# Patient Record
Sex: FEMALE | Race: BLACK OR AFRICAN AMERICAN | Employment: UNEMPLOYED | URBAN - METROPOLITAN AREA
[De-identification: names, ages, dates, MRNs, and addresses within clinical notes are randomized per-mention and may not be internally consistent; named-entity substitution may affect disease eponyms.]

---

## 2018-04-20 ENCOUNTER — HOSPITAL ENCOUNTER (OUTPATIENT)
Dept: ULTRASOUND IMAGING | Age: 38
Discharge: HOME | End: 2018-04-20
Attending: FAMILY MEDICINE
Payer: COMMERCIAL

## 2018-04-20 DIAGNOSIS — M25.461 SWELLING OF RIGHT KNEE JOINT: ICD-10-CM

## 2018-04-20 PROCEDURE — 93971 EXTREMITY STUDY: CPT

## 2019-09-27 ENCOUNTER — OFFICE VISIT (OUTPATIENT)
Dept: INTERNAL MEDICINE CLINIC | Age: 39
End: 2019-09-27

## 2019-09-27 VITALS
WEIGHT: 259 LBS | HEART RATE: 99 BPM | OXYGEN SATURATION: 100 % | DIASTOLIC BLOOD PRESSURE: 60 MMHG | RESPIRATION RATE: 16 BRPM | SYSTOLIC BLOOD PRESSURE: 110 MMHG | TEMPERATURE: 98.1 F | HEIGHT: 67 IN | BODY MASS INDEX: 40.65 KG/M2

## 2019-09-27 DIAGNOSIS — Z00.00 ROUTINE CHECK-UP: Primary | ICD-10-CM

## 2019-09-27 DIAGNOSIS — E66.01 OBESITY, MORBID (HCC): ICD-10-CM

## 2019-09-27 LAB
BILIRUB UR QL STRIP: NEGATIVE
CHOLEST SERPL-MCNC: 166 MG/DL
GLUCOSE POC: 116 MG/DL
GLUCOSE UR-MCNC: NEGATIVE MG/DL
HBA1C MFR BLD HPLC: 5 %
HDLC SERPL-MCNC: 23 MG/DL
KETONES P FAST UR STRIP-MCNC: NEGATIVE MG/DL
LDL CHOLESTEROL POC: 123 MG/DL
PH UR STRIP: 5.5 [PH] (ref 4.6–8)
PROT UR QL STRIP: NEGATIVE
SP GR UR STRIP: 1.02 (ref 1–1.03)
TCHOL/HDL RATIO (POC): 7.4
TRIGL SERPL-MCNC: 103 MG/DL
UA UROBILINOGEN AMB POC: NORMAL (ref 0.2–1)
URINALYSIS CLARITY POC: CLEAR
URINALYSIS COLOR POC: YELLOW
URINE BLOOD POC: NORMAL
URINE LEUKOCYTES POC: NEGATIVE
URINE NITRITES POC: NEGATIVE
VLDLC SERPL CALC-MCNC: 144 MG/DL

## 2019-09-27 RX ORDER — SPIRONOLACTONE 25 MG/1
50 TABLET ORAL DAILY
Refills: 3 | COMMUNITY
Start: 2019-08-29 | End: 2021-11-15 | Stop reason: SDUPTHER

## 2019-09-27 RX ORDER — FUROSEMIDE 40 MG/1
80 TABLET ORAL 2 TIMES DAILY
COMMUNITY
Start: 2018-03-17 | End: 2021-12-10

## 2019-09-27 NOTE — PROGRESS NOTES
1. Have you been to the ER, urgent care clinic since your last visit? Hospitalized since your last visit? Yes When: 2/2019 Where: [facility] Reason for visit: Heart issue    2. Have you seen or consulted any other health care providers outside of the [address] [place] [address] since your last visit? Include any pap smears or colon screening.  No       Chief Complaint   Patient presents with   [facility] Patient     Visit Vitals  /60 (BP 1 Location: Left arm, BP Patient Position: Sitting)   Pulse 99   Temp 98.1 °F (36.7 °C) (Oral)   Resp 16   Ht 5' 7\" (1.702 m)   Wt 259 lb (117.5 kg)   SpO2 100%   BMI 40.57 kg/m²

## 2021-02-09 LAB — CREATININE, EXTERNAL: 1.02

## 2021-08-09 ENCOUNTER — OFFICE VISIT (OUTPATIENT)
Dept: FAMILY MEDICINE CLINIC | Age: 41
End: 2021-08-09
Payer: MEDICARE

## 2021-08-09 VITALS
HEART RATE: 101 BPM | OXYGEN SATURATION: 98 % | HEIGHT: 68 IN | TEMPERATURE: 97.3 F | DIASTOLIC BLOOD PRESSURE: 77 MMHG | RESPIRATION RATE: 18 BRPM | SYSTOLIC BLOOD PRESSURE: 116 MMHG | BODY MASS INDEX: 42.31 KG/M2 | WEIGHT: 279.2 LBS

## 2021-08-09 DIAGNOSIS — I50.9 CHRONIC CONGESTIVE HEART FAILURE, UNSPECIFIED HEART FAILURE TYPE (HCC): ICD-10-CM

## 2021-08-09 DIAGNOSIS — Z13.220 SCREENING, LIPID: ICD-10-CM

## 2021-08-09 DIAGNOSIS — Z11.59 ENCOUNTER FOR HEPATITIS C SCREENING TEST FOR LOW RISK PATIENT: ICD-10-CM

## 2021-08-09 DIAGNOSIS — Z13.1 SCREENING FOR DIABETES MELLITUS: ICD-10-CM

## 2021-08-09 DIAGNOSIS — Z00.00 WELCOME TO MEDICARE PREVENTIVE VISIT: Primary | ICD-10-CM

## 2021-08-09 DIAGNOSIS — Z11.3 SCREENING EXAMINATION FOR STD (SEXUALLY TRANSMITTED DISEASE): ICD-10-CM

## 2021-08-09 DIAGNOSIS — Z72.0 TOBACCO ABUSE: ICD-10-CM

## 2021-08-09 DIAGNOSIS — Z86.39 HISTORY OF GRAVES' DISEASE: ICD-10-CM

## 2021-08-09 PROCEDURE — G8427 DOCREV CUR MEDS BY ELIG CLIN: HCPCS | Performed by: NURSE PRACTITIONER

## 2021-08-09 PROCEDURE — G0402 INITIAL PREVENTIVE EXAM: HCPCS | Performed by: NURSE PRACTITIONER

## 2021-08-09 PROCEDURE — G8432 DEP SCR NOT DOC, RNG: HCPCS | Performed by: NURSE PRACTITIONER

## 2021-08-09 PROCEDURE — G8417 CALC BMI ABV UP PARAM F/U: HCPCS | Performed by: NURSE PRACTITIONER

## 2021-08-09 RX ORDER — LEVOTHYROXINE SODIUM 50 UG/1
TABLET ORAL
COMMUNITY
Start: 2021-07-10 | End: 2021-11-15 | Stop reason: SDUPTHER

## 2021-08-09 RX ORDER — PANTOPRAZOLE SODIUM 20 MG/1
TABLET, DELAYED RELEASE ORAL
COMMUNITY
Start: 2021-05-19 | End: 2021-11-15 | Stop reason: SDUPTHER

## 2021-08-09 RX ORDER — HYDRALAZINE HYDROCHLORIDE 25 MG/1
TABLET, FILM COATED ORAL
COMMUNITY
Start: 2021-07-18 | End: 2021-12-10

## 2021-08-09 RX ORDER — SACUBITRIL AND VALSARTAN 49; 51 MG/1; MG/1
TABLET, FILM COATED ORAL
COMMUNITY
Start: 2021-07-18 | End: 2021-11-17 | Stop reason: SDUPTHER

## 2021-08-09 RX ORDER — ERGOCALCIFEROL 1.25 MG/1
CAPSULE ORAL
COMMUNITY
Start: 2021-07-18 | End: 2021-11-15 | Stop reason: SDUPTHER

## 2021-08-09 RX ORDER — POTASSIUM CHLORIDE 20 MEQ/1
TABLET, EXTENDED RELEASE ORAL
COMMUNITY
Start: 2021-07-18 | End: 2021-12-10

## 2021-08-09 NOTE — PROGRESS NOTES
HPI:   Haleigh Araya is a 35 y.o. female who presents to [facility]. She has a history of heart failure. She needs a new cardiologist.  She has a pacemaker. She has had Raford Knuckles in past. She would like updated STD screening. She is . She has handicapped parking. She is able to walk about 100 feet before she becomes short of breath. Endocrine Review  Patient is seen for followup of hypothyroidism, Graves Disease and status post I -131 treatment. Since last visit: no changes. She reports medication compliance: all the time and is taking separate from all her other meds. She reports the following concerns/problems/med side effects: none. Lab review: orders written for new lab studies as appropriate; see orders. She has not been COVID vaccinated. Current Outpatient Medications   Medication Sig Dispense Refill    ergocalciferol (ERGOCALCIFEROL) 1,250 mcg (50,000 unit) capsule TAKE 1 CAPSULE BY MOUTH EVERY 30 DAYS      hydrALAZINE (APRESOLINE) 25 mg tablet TAKE 1 TABLET BY MOUTH THREE TIMES DAILY FOR 90 DAYS      potassium chloride (K-DUR, KLOR-CON) 20 mEq tablet TAKE 1 TABLET BY MOUTH ONCE DAILY FOR 90 DAYS      Entresto 49-51 mg tab tablet TAKE 1 TABLET BY MOUTH EVERY 12 HOURS      levothyroxine (SYNTHROID) 50 mcg tablet TAKE 1 TABLET BY MOUTH ONCE DAILY      pantoprazole (PROTONIX) 20 mg tablet TAKE 1 TABLET BY MOUTH ONCE DAILY FOR 90 DAYS      furosemide (LASIX) 40 mg tablet 80 mg two (2) times a day.  spironolactone (ALDACTONE) 25 mg tablet Take 25 mg by mouth.  2 tabs BID  3      Allergies   Allergen Reactions    Bactrim [Sulfamethoxazole-Trimethoprim] Hives      Patient Active Problem List   Diagnosis Code    History of Graves' disease Z86.39    Obesity, morbid ([facility] E66.01    Chronic congestive heart failure (HCC) I50.9    Tobacco abuse Z72.0     Past Medical History:   Diagnosis Date    Congestive heart disease ([facility]     Endocrine disease Grave's Disease    Other ill-defined conditions(799.89)     migraines    Other ill-defined conditions(799.89)     graves    Thyroid disease       Past Surgical History:   Procedure Laterality Date    HX HYSTERECTOMY  2012    HX MITRAL VALVE REPLACEMENT  2018    HX OTHER SURGICAL      removal of ink pen from abdomen    HX PACEMAKER  2021      Patient's last menstrual period was 02/17/2011. Family History   Problem Relation Age of Onset    No Known Problems Mother     Heart Failure Father     Substance Abuse Father     Breast Cancer Maternal Grandmother 72    Hypertension Paternal Grandmother     Hypertension Sister     Asthma Sister     Diabetes Maternal Grandfather     Asthma Brother     No Known Problems Daughter     No Known Problems Son       Social History     Socioeconomic History    Marital status:      Spouse name: Not on file    Number of children: Not on file    Years of education: Not on file    Highest education level: Not on file   Occupational History    Not on file   Tobacco Use    Smoking status: Current Every Day Smoker     Packs/day: 0.25    Smokeless tobacco: Never Used   Vaping Use    Vaping Use: Never used   Substance and Sexual Activity    Alcohol use: Yes     Comment: rarely    Drug use: No    Sexual activity: Yes     Partners: Male     Birth control/protection: None   Other Topics Concern    Not on file   Social History Narrative    Not on file     Social Determinants of Health     Financial Resource Strain:     Difficulty of Paying Living Expenses:    Food Insecurity:     Worried About Running Out of Food in the Last Year:     Ran Out of Food in the Last Year:    Transportation Needs:     Lack of Transportation (Medical):      Lack of Transportation (Non-Medical):    Physical Activity:     Days of Exercise per Week:     Minutes of Exercise per Session:    Stress:     Feeling of Stress :    Social Connections:     Frequency of Communication with Friends and Family:     Frequency of Social Gatherings with Friends and Family:     Attends Mu-ism Services:     Active Member of Clubs or Organizations:     Attends Club or Organization Meetings:     Marital Status:    Intimate Partner Violence:     Fear of Current or Ex-Partner:     Emotionally Abused:     Physically Abused:     Sexually Abused:         ROS:   Review of Systems   Constitutional: Positive for malaise/fatigue. Negative for fever and weight loss. HENT: Negative for hearing loss. Eyes: Negative for blurred vision and pain. Respiratory: Negative for cough and shortness of breath. Cardiovascular: Negative for chest pain, palpitations and leg swelling. Gastrointestinal: Negative for abdominal pain, blood in stool, constipation, diarrhea and melena. Genitourinary: Negative for dysuria and hematuria. Musculoskeletal: Negative for joint pain. Skin: Negative for rash. Neurological: Negative for headaches. Psychiatric/Behavioral: Negative for depression. The patient is not nervous/anxious and does not have insomnia. Physical Exam:     Visit Vitals  /77 (BP 1 Location: Left upper arm, BP Patient Position: Sitting, BP Cuff Size: Adult)   Pulse (!) 101   Temp 97.3 °F (36.3 °C) (Temporal)   Resp 18   Ht 5' 8\" (1.727 m)   Wt 279 lb 3.2 oz (126.6 kg)   LMP 02/17/2011   SpO2 98%   BMI 42.45 kg/m²        Vitals and Nurse Documentation reviewed. Physical Exam  Constitutional:       General: She is not in acute distress. Appearance: She is obese. HENT:      Right Ear: No drainage. No middle ear effusion. Tympanic membrane is not injected, erythematous or bulging. Left Ear: No drainage. No middle ear effusion. Tympanic membrane is not injected, erythematous or bulging. Eyes:      Pupils: Pupils are equal, round, and reactive to light. Neck:      Trachea: No tracheal deviation.    Cardiovascular:      Pulses:           Dorsalis pedis pulses are 2+ on the right side and 2+ on the left side. Posterior tibial pulses are 2+ on the right side and 2+ on the left side. Heart sounds: S1 normal and S2 normal. No murmur heard. No friction rub. No gallop. Comments: Pacemaker Intact  Pulmonary:      Breath sounds: Normal breath sounds. No wheezing. Abdominal:      General: Bowel sounds are normal. There is no distension. Palpations: Abdomen is soft. There is no mass. Tenderness: There is no abdominal tenderness. Lymphadenopathy:      Cervical: No cervical adenopathy. Skin:     General: Skin is warm and dry. Neurological:      Cranial Nerves: No cranial nerve deficit. Sensory: Sensation is intact. Motor: Motor function is intact. PROGRESS NOTES    This is a \"Welcome to [employer]" Initial Preventive Physical Examination (IPPE)    I have reviewed the patient's medical history in detail and updated the computerized patient record. Depression risk factor summary:      Patient Health Questionnaire (PHQ-9)   Over the last 2 weeks, how often have you been bothered by any of the following problems? · Little interest or pleasure in doing things? · Not at all. [0]  · Feeling down, depressed, or hopeless? · Not at all. [0]  · Trouble falling or staying asleep, or sleeping too much? · Not at all. [0]  · Feeling tired or having little energy? · Not at all. [0]  · Poor appetite or overeating? · Not at all. [0]  · Feeling bad about yourself - or that you are a failure or have let yourself or your family down? · Not at all. [0]  · Trouble concentrating on things, such as reading the newspaper or watching television? · Not at all. [0]  · Moving or speaking so slowly that other people could have noticed? Or the opposite - being so fidgety or restless that you have been moving around a lot more than usual?  · Not at all. [0]  · Thoughts that you would be better off dead, or of hurting yourself in some way? · Not at all. [0]    Total Score: 0/27  Depression Severity:   0-4 None, 5-9 mild, 10-14 moderate, 15-19 moderately severe, 20-27 severe. Functional Ability and Level of Safety:    Hearing Loss    Hearing is good. Activities of Daily Living   Self-care. Requires assistance with: no ADLs    Fall Risk   No fall risk factors    Abuse Screen   Patient is not abused  None    Adult Nutrition Screen   No risk factors noted. Review of Systems   A comprehensive review of systems was negative except for that written in the HPI. Physical Exam     Visit Vitals  /77 (BP 1 Location: Left upper arm, BP Patient Position: Sitting, BP Cuff Size: Adult)   Pulse (!) 101   Temp 97.3 °F (36.3 °C) (Temporal)   Resp 18   Ht 5' 8\" (1.727 m)   Wt 279 lb 3.2 oz (126.6 kg)   LMP 02/17/2011   SpO2 98%   BMI 42.45 kg/m²      Body mass index is 42.45 kg/m². Visual Acuity: Not performed      Assessment/Plan:   Education and counseling provided:  Are appropriate based on today's review and evaluation    ICD-10-CM ICD-9-CM    1. Welcome to Medicare preventive visit  Z00.00 V70.0    2. History of Graves' disease  Z86.39 V12.29 TSH 3RD GENERATION      T4, FREE      T4, FREE      TSH 3RD GENERATION   3. Screening examination for STD (sexually transmitted disease)  Z11.3 V74.5 CT/NG/T.VAGINALIS AMPLIFICATION      RPR      HIV 1/2 AG/AB, 4TH GENERATION,W RFLX CONFIRM      HSV TYPE-SPECIFIC AB, IGG      HSV TYPE-SPECIFIC AB, IGG      HIV 1/2 AG/AB, 4TH GENERATION,W RFLX CONFIRM      RPR      CT/NG/T.VAGINALIS AMPLIFICATION   4. Encounter for hepatitis C screening test for low risk patient  Z11.59 V73.89 HEPATITIS C AB      HEPATITIS C AB   5. Chronic congestive heart failure, unspecified heart failure type (HCC)  I50.9 428.0 CBC WITH AUTOMATED DIFF      METABOLIC PANEL, COMPREHENSIVE      VITAMIN D, 25 HYDROXY      NT-PRO BNP      NT-PRO BNP      VITAMIN D, 25 HYDROXY      METABOLIC PANEL, COMPREHENSIVE      CBC WITH AUTOMATED DIFF   6.  Screening for diabetes mellitus  Z13.1 V77.1 HEMOGLOBIN A1C WITH EAG      HEMOGLOBIN A1C WITH EAG   7. Screening, lipid  Z13.220 V77.91 LIPID PANEL      LIPID PANEL   8. Tobacco abuse  Z72.0 305.1      Will await records from [address] [place] [address] today to see if she requires cardiology versus the heart failure clinic. Follow-up and Dispositions    · Return if symptoms worsen or fail to improve.

## 2021-08-09 NOTE — PROGRESS NOTES
Chief Complaint   Patient presents with   [facility] Establish Care     1. Have you been to the ER, urgent care clinic since your last visit? Hospitalized since your last visit? No    2. Have you seen or consulted any other health care providers outside of the [address] [place] [address] since your last visit? Include any pap smears or colon screening.  No

## 2021-08-10 PROBLEM — Z72.0 TOBACCO ABUSE: Status: ACTIVE | Noted: 2021-08-10

## 2021-08-10 PROBLEM — I50.9 CHRONIC CONGESTIVE HEART FAILURE (HCC): Status: ACTIVE | Noted: 2021-08-10

## 2021-08-10 LAB
25(OH)D3 SERPL-MCNC: 30.1 NG/ML (ref 30–100)
ALBUMIN SERPL-MCNC: 4.3 G/DL (ref 3.5–5)
ALBUMIN/GLOB SERPL: 0.9 {RATIO} (ref 1.1–2.2)
ALP SERPL-CCNC: 110 U/L (ref 45–117)
ALT SERPL-CCNC: 34 U/L (ref 12–78)
ANION GAP SERPL CALC-SCNC: 5 MMOL/L (ref 5–15)
AST SERPL-CCNC: 34 U/L (ref 15–37)
BASOPHILS # BLD: 0.1 K/UL (ref 0–0.1)
BASOPHILS NFR BLD: 1 % (ref 0–1)
BILIRUB SERPL-MCNC: 0.4 MG/DL (ref 0.2–1)
BNP SERPL-MCNC: 56 PG/ML
BUN SERPL-MCNC: 9 MG/DL (ref 6–20)
BUN/CREAT SERPL: 9 (ref 12–20)
CALCIUM SERPL-MCNC: 9.7 MG/DL (ref 8.5–10.1)
CHLORIDE SERPL-SCNC: 102 MMOL/L (ref 97–108)
CHOLEST SERPL-MCNC: 244 MG/DL
CO2 SERPL-SCNC: 32 MMOL/L (ref 21–32)
CREAT SERPL-MCNC: 1.01 MG/DL (ref 0.55–1.02)
DIFFERENTIAL METHOD BLD: ABNORMAL
EOSINOPHIL # BLD: 0 K/UL (ref 0–0.4)
EOSINOPHIL NFR BLD: 0 % (ref 0–7)
ERYTHROCYTE [DISTWIDTH] IN BLOOD BY AUTOMATED COUNT: 16.5 % (ref 11.5–14.5)
EST. AVERAGE GLUCOSE BLD GHB EST-MCNC: 123 MG/DL
GLOBULIN SER CALC-MCNC: 4.7 G/DL (ref 2–4)
GLUCOSE SERPL-MCNC: 63 MG/DL (ref 65–100)
HBA1C MFR BLD: 5.9 % (ref 4–5.6)
HCT VFR BLD AUTO: 40 % (ref 35–47)
HCV AB SERPL QL IA: NONREACTIVE
HDLC SERPL-MCNC: 40 MG/DL
HDLC SERPL: 6.1 {RATIO} (ref 0–5)
HGB BLD-MCNC: 12.4 G/DL (ref 11.5–16)
HIV 1+2 AB+HIV1 P24 AG SERPL QL IA: NONREACTIVE
HIV12 RESULT COMMENT, HHIVC: NORMAL
HSV1 IGG SER QL: NORMAL
IMM GRANULOCYTES # BLD AUTO: 0 K/UL
IMM GRANULOCYTES NFR BLD AUTO: 0 %
LDLC SERPL CALC-MCNC: 170.4 MG/DL (ref 0–100)
LYMPHOCYTES # BLD: 4.8 K/UL (ref 0.8–3.5)
LYMPHOCYTES NFR BLD: 48 % (ref 12–49)
MCH RBC QN AUTO: 28.8 PG (ref 26–34)
MCHC RBC AUTO-ENTMCNC: 31 G/DL (ref 30–36.5)
MCV RBC AUTO: 92.8 FL (ref 80–99)
MONOCYTES # BLD: 0.9 K/UL (ref 0–1)
MONOCYTES NFR BLD: 9 % (ref 5–13)
NEUTS SEG # BLD: 4.2 K/UL (ref 1.8–8)
NEUTS SEG NFR BLD: 42 % (ref 32–75)
NRBC # BLD: 0 K/UL (ref 0–0.01)
NRBC BLD-RTO: 0 PER 100 WBC
PLATELET # BLD AUTO: 421 K/UL (ref 150–400)
PMV BLD AUTO: 9.3 FL (ref 8.9–12.9)
POTASSIUM SERPL-SCNC: 3.9 MMOL/L (ref 3.5–5.1)
PROT SERPL-MCNC: 9 G/DL (ref 6.4–8.2)
RBC # BLD AUTO: 4.31 M/UL (ref 3.8–5.2)
RBC MORPH BLD: ABNORMAL
RPR SER QL: NONREACTIVE
SODIUM SERPL-SCNC: 139 MMOL/L (ref 136–145)
T4 FREE SERPL-MCNC: 1 NG/DL (ref 0.8–1.5)
TRIGL SERPL-MCNC: 168 MG/DL (ref ?–150)
TSH SERPL DL<=0.05 MIU/L-ACNC: 3.74 UIU/ML (ref 0.36–3.74)
VLDLC SERPL CALC-MCNC: 33.6 MG/DL
WBC # BLD AUTO: 10 K/UL (ref 3.6–11)
WBC MORPH BLD: ABNORMAL

## 2021-08-11 DIAGNOSIS — E78.2 MIXED HYPERLIPIDEMIA: Primary | ICD-10-CM

## 2021-08-11 LAB
C TRACH RRNA SPEC QL NAA+PROBE: NEGATIVE
N GONORRHOEA RRNA SPEC QL NAA+PROBE: NEGATIVE
SPECIMEN SOURCE: ABNORMAL
T VAGINALIS RRNA VAG QL NAA+PROBE: POSITIVE

## 2021-08-11 RX ORDER — ATORVASTATIN CALCIUM 20 MG/1
20 TABLET, FILM COATED ORAL DAILY
Qty: 30 TABLET | Refills: 3 | Status: SHIPPED | OUTPATIENT
Start: 2021-08-11 | End: 2021-11-15 | Stop reason: SDUPTHER

## 2021-08-12 DIAGNOSIS — A59.9 TRICHOMONOSIS: Primary | ICD-10-CM

## 2021-08-12 RX ORDER — METRONIDAZOLE 500 MG/1
2000 TABLET ORAL ONCE
Qty: 4 TABLET | Refills: 0 | Status: SHIPPED | OUTPATIENT
Start: 2021-08-12 | End: 2021-08-12

## 2021-09-06 NOTE — PROGRESS NOTES
[facility] [place]: Vic Nguyen  [phone]    History of Present Illness:  Ms. Jael Pantoja is a 34 yo F with h/o non ischemic cardiomyopathy (cath 2018 with no significant CAD; mild LAD/RCA plaquing) EF of 35-40% by echo 2021 (as low as 13% in past per pt), severe MR s/p MV repair 2018, status post Medtronic ICD in 01/2021, HTN, mixed hyperlipidemia, h/o hyperthyroid, Grave's disease (treated with radiation isotope therapy in 2019), referred by Stephany Rich NP for cardiac evaluation. From a symptom standpoint, she continues with shortness of breath with activity and she now resides in a house where she does not have to go up and down stairs. She denies any chest pain. She still does have palpitations on occasion. She is compliant with her medications. Was under consideration for LVAD/transplant at one point and followed by CHF clinic at [address] [place] [address]. She is compensated on exam with clear lungs and trace lower extremity edema. Cath 2019 noted no significant CAD, 30-40% RCA lesion. Cath in 2018 with mild plaquing (30% proximal LAD, 30-40% mid RCA). Cardiac MRI in 01/2018 with ejection fraction of 40% and findings of dilated nonischemic cardiomyopathy. Right heart catheterization in 2018, 2019 and 2020. In 08/2018 was status post mitral valve repair with just mild to moderate mitral regurgitation thereafter. Her echocardiograms in 2018 and 2019 through 2021 overall demonstrated ejection fraction between 35-40%. Her Grave's disease was treated with radiation isotope therapy in 2019. Soc hx. Tobacco 3-4 days 1 pack  Fam hx. Dad side of family. Assessment and Plan:   1. Nonischemic cardiomyopathy. Stable and compensated. Will continue regimen including Entresto, Spironolactone, Hydralazine and Furosemide. Will obtain an echocardiogram to reassess her LV function. 2. ICD. Will have her set up with the device clinic here. 3. Tobacco use. She is down to a pack of cigarettes every three to four days.   Talked about complete cessation. 4. Essential hypertension. Blood pressure is controlled and no changes made. 5. Mixed hyperlipidemia. Tolerating statin. She  has a past medical history of Congestive heart disease ([address] Endocrine disease, Other ill-defined conditions(799.89), Other ill-defined conditions(799.89), and Thyroid disease. All other systems negative except as above. PE  Vitals:    09/07/21 0909   BP: 120/76   Pulse: 83   Resp: 14   SpO2: 98%   Weight: 278 lb 12.8 oz (126.5 kg)   Height: 5' 8\" (1.727 m)    Body mass index is 42.39 kg/m².    General appearance - alert, well appearing, and in no distress  Mental status - affect appropriate to mood  Eyes - sclera anicteric, moist mucous membranes  Neck - supple, no JVD  Chest - clear to auscultation, no wheezes, rales or rhonchi  Heart - normal rate, regular rhythm, normal S1, S2, I/VI systolic murmur LUSB  Abdomen - soft, nontender, nondistended, no masses or organomegaly  Neurological -no focal deficit  Extremities - peripheral pulses normal, no pedal edema      Recent Labs:  Lab Results   Component Value Date/Time    Cholesterol, total 244 (H) 08/09/2021 12:19 PM    HDL Cholesterol 40 08/09/2021 12:19 PM    LDL, calculated 170.4 (H) 08/09/2021 12:19 PM    Triglyceride 168 (H) 08/09/2021 12:19 PM    CHOL/HDL Ratio 6.1 (H) 08/09/2021 12:19 PM     Lab Results   Component Value Date/Time    Creatinine (POC) 0.4 (L) 06/06/2010 12:05 PM    Creatinine 1.01 08/09/2021 12:19 PM     Lab Results   Component Value Date/Time    BUN 9 08/09/2021 12:19 PM    BUN (POC) 9 06/06/2010 12:05 PM     Lab Results   Component Value Date/Time    Potassium 3.9 08/09/2021 12:19 PM     Lab Results   Component Value Date/Time    Hemoglobin A1c 5.9 (H) 08/09/2021 12:19 PM     Lab Results   Component Value Date/Time    Hemoglobin (POC) 11.2 (L) 06/06/2010 12:05 PM    HGB 12.4 08/09/2021 12:19 PM     Lab Results   Component Value Date/Time    PLATELET 334 (H) 50/93/8430 12:19 PM       Reviewed:  Past Medical History:   Diagnosis Date    Congestive heart disease ([facility]     Endocrine disease     Grave's Disease    Other ill-defined conditions(799.89)     migraines    Other ill-defined conditions(799.89)     graves    Thyroid disease      Social History     Tobacco Use   Smoking Status Current Every Day Smoker    Packs/day: 0.25   Smokeless Tobacco Never Used     Social History     Substance and Sexual Activity   Alcohol Use Yes    Comment: rarely     Allergies   Allergen Reactions    Bactrim [Sulfamethoxazole-Trimethoprim] Hives       Current Outpatient Medications   Medication Sig    furosemide (LASIX) 80 mg tablet Take 80 mg by mouth two (2) times a day.  atorvastatin (LIPITOR) 20 mg tablet Take 1 Tablet by mouth daily.  ergocalciferol (ERGOCALCIFEROL) 1,250 mcg (50,000 unit) capsule TAKE 1 CAPSULE BY MOUTH EVERY 30 DAYS    Entresto 49-51 mg tab tablet TAKE 1 TABLET BY MOUTH EVERY 12 HOURS    levothyroxine (SYNTHROID) 50 mcg tablet TAKE 1 TABLET BY MOUTH ONCE DAILY    pantoprazole (PROTONIX) 20 mg tablet TAKE 1 TABLET BY MOUTH ONCE DAILY FOR 90 DAYS    spironolactone (ALDACTONE) 25 mg tablet Take 50 mg by mouth daily.  hydrALAZINE (APRESOLINE) 25 mg tablet TAKE 1 TABLET BY MOUTH THREE TIMES DAILY FOR 90 DAYS (Patient not taking: Reported on 9/7/2021)    potassium chloride (K-DUR, KLOR-CON) 20 mEq tablet TAKE 1 TABLET BY MOUTH ONCE DAILY FOR 90 DAYS (Patient not taking: Reported on 9/7/2021)    furosemide (LASIX) 40 mg tablet 80 mg two (2) times a day. No current facility-administered medications for this visit.        Kt Crowe MD  [facility] heart and Vascular [place]  [address] [facility] [address]  [address]

## 2021-09-07 ENCOUNTER — OFFICE VISIT (OUTPATIENT)
Dept: CARDIOLOGY CLINIC | Age: 41
End: 2021-09-07
Payer: MEDICARE

## 2021-09-07 VITALS
HEART RATE: 83 BPM | SYSTOLIC BLOOD PRESSURE: 120 MMHG | OXYGEN SATURATION: 98 % | BODY MASS INDEX: 42.25 KG/M2 | RESPIRATION RATE: 14 BRPM | WEIGHT: 278.8 LBS | HEIGHT: 68 IN | DIASTOLIC BLOOD PRESSURE: 76 MMHG

## 2021-09-07 DIAGNOSIS — I42.8 NON-ISCHEMIC CARDIOMYOPATHY (HCC): Primary | ICD-10-CM

## 2021-09-07 DIAGNOSIS — E78.2 MIXED HYPERLIPIDEMIA: ICD-10-CM

## 2021-09-07 DIAGNOSIS — I10 BENIGN ESSENTIAL HTN: ICD-10-CM

## 2021-09-07 DIAGNOSIS — Z72.0 TOBACCO USE: ICD-10-CM

## 2021-09-07 DIAGNOSIS — Z98.890 S/P MVR (MITRAL VALVE REPAIR): ICD-10-CM

## 2021-09-07 PROCEDURE — 93010 ELECTROCARDIOGRAM REPORT: CPT | Performed by: INTERNAL MEDICINE

## 2021-09-07 PROCEDURE — 93005 ELECTROCARDIOGRAM TRACING: CPT | Performed by: INTERNAL MEDICINE

## 2021-09-07 PROCEDURE — 99204 OFFICE O/P NEW MOD 45 MIN: CPT | Performed by: INTERNAL MEDICINE

## 2021-09-07 PROCEDURE — G0463 HOSPITAL OUTPT CLINIC VISIT: HCPCS | Performed by: INTERNAL MEDICINE

## 2021-09-07 RX ORDER — FUROSEMIDE 80 MG/1
80 TABLET ORAL 2 TIMES DAILY
COMMUNITY
End: 2021-11-15 | Stop reason: SDUPTHER

## 2021-09-09 ENCOUNTER — CLINICAL SUPPORT (OUTPATIENT)
Dept: CARDIOLOGY CLINIC | Age: 41
End: 2021-09-09
Payer: MEDICARE

## 2021-09-09 ENCOUNTER — ANCILLARY PROCEDURE (OUTPATIENT)
Dept: CARDIOLOGY CLINIC | Age: 41
End: 2021-09-09
Payer: MEDICARE

## 2021-09-09 VITALS — WEIGHT: 278 LBS | BODY MASS INDEX: 42.13 KG/M2 | HEIGHT: 68 IN

## 2021-09-09 DIAGNOSIS — Z95.810 AUTOMATIC IMPLANTABLE CARDIAC DEFIBRILLATOR IN SITU: Primary | ICD-10-CM

## 2021-09-09 DIAGNOSIS — E78.2 MIXED HYPERLIPIDEMIA: ICD-10-CM

## 2021-09-09 DIAGNOSIS — I42.8 NON-ISCHEMIC CARDIOMYOPATHY (HCC): ICD-10-CM

## 2021-09-09 DIAGNOSIS — Z98.890 S/P MVR (MITRAL VALVE REPAIR): ICD-10-CM

## 2021-09-09 DIAGNOSIS — Z72.0 TOBACCO USE: ICD-10-CM

## 2021-09-09 DIAGNOSIS — I10 BENIGN ESSENTIAL HTN: ICD-10-CM

## 2021-09-09 LAB
ECHO AO ASC DIAM: 2.41 CM
ECHO AO ROOT DIAM: 2.72 CM
ECHO AV AREA PEAK VELOCITY: 1.88 CM2
ECHO AV AREA VTI: 1.97 CM2
ECHO AV AREA/BSA PEAK VELOCITY: 0.8 CM2/M2
ECHO AV AREA/BSA VTI: 0.8 CM2/M2
ECHO AV MEAN GRADIENT: 3.29 MMHG
ECHO AV PEAK GRADIENT: 5.06 MMHG
ECHO AV PEAK VELOCITY: 112.53 CM/S
ECHO AV VTI: 22.83 CM
ECHO LA AREA 4C: 22.93 CM2
ECHO LA MAJOR AXIS: 4.18 CM
ECHO LA MINOR AXIS: 1.78 CM
ECHO LA VOL 2C: 73.5 ML (ref 22–52)
ECHO LA VOL 4C: 72.39 ML (ref 22–52)
ECHO LA VOL BP: 81.21 ML (ref 22–52)
ECHO LA VOL/BSA BIPLANE: 34.56 ML/M2 (ref 16–28)
ECHO LA VOLUME INDEX A2C: 31.28 ML/M2 (ref 16–28)
ECHO LA VOLUME INDEX A4C: 30.8 ML/M2 (ref 16–28)
ECHO LV EDV A2C: 90.72 ML
ECHO LV EDV A4C: 122.39 ML
ECHO LV EDV BP: 112.5 ML (ref 56–104)
ECHO LV EDV INDEX A4C: 52.1 ML/M2
ECHO LV EDV INDEX BP: 47.9 ML/M2
ECHO LV EDV NDEX A2C: 38.6 ML/M2
ECHO LV EJECTION FRACTION A2C: 38 PERCENT
ECHO LV EJECTION FRACTION A4C: 42 PERCENT
ECHO LV EJECTION FRACTION BIPLANE: 41.9 PERCENT (ref 55–100)
ECHO LV ESV A2C: 56.6 ML
ECHO LV ESV A4C: 70.76 ML
ECHO LV ESV BP: 65.39 ML (ref 19–49)
ECHO LV ESV INDEX A2C: 24.1 ML/M2
ECHO LV ESV INDEX A4C: 30.1 ML/M2
ECHO LV ESV INDEX BP: 27.8 ML/M2
ECHO LV INTERNAL DIMENSION DIASTOLIC: 4.29 CM (ref 3.9–5.3)
ECHO LV INTERNAL DIMENSION SYSTOLIC: 3.93 CM
ECHO LV IVSD: 1.22 CM (ref 0.6–0.9)
ECHO LV MASS 2D: 188.5 G (ref 67–162)
ECHO LV MASS INDEX 2D: 80.2 G/M2 (ref 43–95)
ECHO LV POSTERIOR WALL DIASTOLIC: 1.22 CM (ref 0.6–0.9)
ECHO LVOT DIAM: 2.02 CM
ECHO LVOT PEAK GRADIENT: 1.75 MMHG
ECHO LVOT PEAK VELOCITY: 66.16 CM/S
ECHO LVOT SV: 45.1 ML
ECHO LVOT VTI: 14.07 CM
ECHO MV A VELOCITY: 90.51 CM/S
ECHO MV AREA PHT: 2.03 CM2
ECHO MV AREA PHT: 2.03 CM2
ECHO MV AREA VTI: 1.1 CM2
ECHO MV E DECELERATION TIME (DT): 334.53 MS
ECHO MV E VELOCITY: 121.38 CM/S
ECHO MV E/A RATIO: 1.34
ECHO MV MAX VELOCITY: 141.88 CM/S
ECHO MV MEAN GRADIENT: 3.21 MMHG
ECHO MV PEAK GRADIENT: 8.05 MMHG
ECHO MV PRESSURE HALF TIME (PHT): 108.26 MS
ECHO MV VTI: 40.87 CM
ECHO PV MAX VELOCITY: 78.72 CM/S
ECHO PV PEAK INSTANTANEOUS GRADIENT SYSTOLIC: 2.48 MMHG
ECHO PV REGURGITANT MAX VELOCITY: 113.75 CM/S
ECHO TV REGURGITANT MAX VELOCITY: 254.76 CM/S
ECHO TV REGURGITANT PEAK GRADIENT: 25.96 MMHG
LA VOL DISK BP: 73.41 ML (ref 22–52)

## 2021-09-09 PROCEDURE — 93282 PRGRMG EVAL IMPLANTABLE DFB: CPT | Performed by: INTERNAL MEDICINE

## 2021-09-09 PROCEDURE — 93306 TTE W/DOPPLER COMPLETE: CPT | Performed by: INTERNAL MEDICINE

## 2021-11-15 ENCOUNTER — PATIENT MESSAGE (OUTPATIENT)
Dept: FAMILY MEDICINE CLINIC | Age: 41
End: 2021-11-15

## 2021-11-15 DIAGNOSIS — I50.9 CHRONIC CONGESTIVE HEART FAILURE, UNSPECIFIED HEART FAILURE TYPE (HCC): Primary | ICD-10-CM

## 2021-11-15 DIAGNOSIS — E78.2 MIXED HYPERLIPIDEMIA: ICD-10-CM

## 2021-11-15 DIAGNOSIS — Z86.39 HISTORY OF GRAVES' DISEASE: ICD-10-CM

## 2021-11-15 NOTE — TELEPHONE ENCOUNTER
Chief Complaint   Patient presents with    Medication Refill     Entresto 49-51 mg Spironolactone 25mg Atorvastatin 20mg Pantoprazole 20mg Furosemide 80mg Levothyroxine 50 mcg Vitamin D2 (ERGO) 1.25 mg      Patient last office visit 08/09/2021.   Armando Mcclellan LPN

## 2021-11-17 RX ORDER — ERGOCALCIFEROL 1.25 MG/1
CAPSULE ORAL
Qty: 6 CAPSULE | Refills: 1 | Status: SHIPPED | OUTPATIENT
Start: 2021-11-17 | End: 2021-12-14

## 2021-11-17 RX ORDER — LEVOTHYROXINE SODIUM 50 UG/1
50 TABLET ORAL DAILY
Qty: 90 TABLET | Refills: 1 | Status: SHIPPED | OUTPATIENT
Start: 2021-11-17 | End: 2022-05-02

## 2021-11-17 RX ORDER — FUROSEMIDE 80 MG/1
80 TABLET ORAL 2 TIMES DAILY
Qty: 180 TABLET | Refills: 1 | Status: SHIPPED | OUTPATIENT
Start: 2021-11-17 | End: 2021-12-10

## 2021-11-17 RX ORDER — ATORVASTATIN CALCIUM 20 MG/1
20 TABLET, FILM COATED ORAL DAILY
Qty: 90 TABLET | Refills: 1 | Status: SHIPPED | OUTPATIENT
Start: 2021-11-17 | End: 2022-05-27

## 2021-11-17 RX ORDER — SACUBITRIL AND VALSARTAN 49; 51 MG/1; MG/1
1 TABLET, FILM COATED ORAL EVERY 12 HOURS
Qty: 90 TABLET | Refills: 1 | OUTPATIENT
Start: 2021-11-17

## 2021-11-17 RX ORDER — SPIRONOLACTONE 25 MG/1
50 TABLET ORAL DAILY
Qty: 180 TABLET | Refills: 1 | Status: SHIPPED | OUTPATIENT
Start: 2021-11-17 | End: 2022-02-21 | Stop reason: SDUPTHER

## 2021-11-17 RX ORDER — PANTOPRAZOLE SODIUM 20 MG/1
TABLET, DELAYED RELEASE ORAL
Qty: 90 TABLET | Refills: 1 | Status: SHIPPED | OUTPATIENT
Start: 2021-11-17 | End: 2022-05-17

## 2021-11-19 RX ORDER — SACUBITRIL AND VALSARTAN 49; 51 MG/1; MG/1
1 TABLET, FILM COATED ORAL EVERY 12 HOURS
Qty: 180 TABLET | Refills: 1 | Status: SHIPPED | OUTPATIENT
Start: 2021-11-19 | End: 2021-12-10

## 2021-12-10 ENCOUNTER — OFFICE VISIT (OUTPATIENT)
Dept: CARDIOLOGY CLINIC | Age: 41
End: 2021-12-10
Payer: MEDICARE

## 2021-12-10 VITALS
SYSTOLIC BLOOD PRESSURE: 90 MMHG | RESPIRATION RATE: 14 BRPM | OXYGEN SATURATION: 98 % | HEART RATE: 74 BPM | BODY MASS INDEX: 42.53 KG/M2 | DIASTOLIC BLOOD PRESSURE: 70 MMHG | WEIGHT: 280.6 LBS | HEIGHT: 68 IN

## 2021-12-10 DIAGNOSIS — Z95.810 AUTOMATIC IMPLANTABLE CARDIAC DEFIBRILLATOR IN SITU: ICD-10-CM

## 2021-12-10 DIAGNOSIS — I10 BENIGN ESSENTIAL HTN: ICD-10-CM

## 2021-12-10 DIAGNOSIS — Z98.890 S/P MVR (MITRAL VALVE REPAIR): ICD-10-CM

## 2021-12-10 DIAGNOSIS — Z72.0 TOBACCO USE: ICD-10-CM

## 2021-12-10 DIAGNOSIS — I42.8 NON-ISCHEMIC CARDIOMYOPATHY (HCC): Primary | ICD-10-CM

## 2021-12-10 PROCEDURE — 99214 OFFICE O/P EST MOD 30 MIN: CPT | Performed by: INTERNAL MEDICINE

## 2021-12-10 PROCEDURE — G8510 SCR DEP NEG, NO PLAN REQD: HCPCS | Performed by: INTERNAL MEDICINE

## 2021-12-10 PROCEDURE — G0463 HOSPITAL OUTPT CLINIC VISIT: HCPCS | Performed by: INTERNAL MEDICINE

## 2021-12-10 PROCEDURE — G8427 DOCREV CUR MEDS BY ELIG CLIN: HCPCS | Performed by: INTERNAL MEDICINE

## 2021-12-10 PROCEDURE — G8417 CALC BMI ABV UP PARAM F/U: HCPCS | Performed by: INTERNAL MEDICINE

## 2021-12-10 RX ORDER — BUMETANIDE 1 MG/1
1 TABLET ORAL 2 TIMES DAILY
Qty: 180 TABLET | Refills: 1 | Status: SHIPPED | OUTPATIENT
Start: 2021-12-10 | End: 2021-12-14

## 2021-12-10 RX ORDER — SACUBITRIL AND VALSARTAN 24; 26 MG/1; MG/1
1 TABLET, FILM COATED ORAL 2 TIMES DAILY
Qty: 180 TABLET | Refills: 1 | Status: SHIPPED | OUTPATIENT
Start: 2021-12-10 | End: 2022-04-06 | Stop reason: SDUPTHER

## 2021-12-10 NOTE — PROGRESS NOTES
[facility] [place]: Nav Tagn  [phone]    History of Present Illness:  Ms. Nain Pulido is a 34 yo F with h/o non ischemic cardiomyopathy (cath 2018 with no significant CAD; mild LAD/RCA plaquing) EF of 35-40% by echo 2021 (as low as 13% in past per pt), severe MR s/p MV repair 2018, status post Medtronic ICD in 01/2021, HTN, mixed hyperlipidemia, h/o hyperthyroid, Grave's disease (treated with radiation isotope therapy in 2019). Was under consideration for LVAD/transplant at one point and followed by CHF clinic at [facility]. Since her last visit, she has had progressive shortness of breath with exertion, as well as swelling in her legs. She denies any chest pain. She says she has been compliant with her medications. Also, she has been avoiding salt. She is still working on tobacco cessation. She does admit she is not exercising regularly and does not have anyone to go to in the park, so she has been doing just walking around the house. She is compensated on exam with clear lungs. She does have 1+ chronic bilateral lower extremity edema. Her most recent echocardiogram in September demonstrated an EF of 42%, mild to moderate mitral regurgitation for her mitral valve repair. Her weight is overall unchanged, just slightly up from 278 to 280 pounds. Her blood pressure is on the lower side at 90/70. Cath 2019 noted no significant CAD, 30-40% RCA lesion. Cath in 2018 with mild plaquing (30% proximal LAD, 30-40% mid RCA). Cardiac MRI in 01/2018 with ejection fraction of 40% and findings of dilated nonischemic cardiomyopathy. Right heart catheterization in 2018, 2019 and 2020. In 08/2018 was status post mitral valve repair with just mild to moderate mitral regurgitation thereafter. Her echocardiograms in 2018 and 2019 through 2021 overall demonstrated ejection fraction between 35-40%. Grave's disease was treated with radiation isotope therapy in 2019. Soc hx. Tobacco 3-4 days 1 pack  Fam hx.  Dad side of family. Assessment and Plan:   1. Shortness of breath, lower extremity edema. Her symptoms are concerning for fluid retention and will obtain a repeat echocardiogram for further evaluation. Though she does have some response with her Lasix and Spironolactone, will try and see if we can intensify diuresis by switching her Lasix to Bumex 1 mg twice a day. Will have her follow back in one month and reassess. She is also no longer taking Hydralazine. Given her low blood pressure, we will also cut her Entresto in half. 2. Tobacco use. Encouraged cessation. 3. ICD. Followed by the device clinic. 4. Mixed hyperlipidemia. Tolerating statin. She  has a past medical history of Congestive heart disease ([address] Endocrine disease, Other ill-defined conditions(799.89), Other ill-defined conditions(799.89), and Thyroid disease. All other systems negative except as above. PE  Vitals:    12/10/21 1103   BP: 90/70   Pulse: 74   Resp: 14   SpO2: 98%   Weight: 280 lb 9.6 oz (127.3 kg)   Height: 5' 8\" (1.727 m)    Body mass index is 42.67 kg/m².    General appearance - alert, well appearing, and in no distress  Mental status - affect appropriate to mood  Eyes - sclera anicteric, moist mucous membranes  Neck - supple, no JVD  Chest - clear to auscultation, no wheezes, rales or rhonchi  Heart - normal rate, regular rhythm, normal S1, S2, I/VI systolic murmur LUSB  Abdomen - soft, nontender, nondistended, no masses or organomegaly  Neurological -no focal deficit  Extremities - peripheral pulses normal, no pedal edema      Recent Labs:  Lab Results   Component Value Date/Time    Cholesterol, total 244 (H) 08/09/2021 12:19 PM    HDL Cholesterol 40 08/09/2021 12:19 PM    LDL, calculated 170.4 (H) 08/09/2021 12:19 PM    Triglyceride 168 (H) 08/09/2021 12:19 PM    CHOL/HDL Ratio 6.1 (H) 08/09/2021 12:19 PM     Lab Results   Component Value Date/Time    Creatinine (POC) 0.4 (L) 06/06/2010 12:05 PM    Creatinine 1.01 08/09/2021 12:19 PM     Lab Results   Component Value Date/Time    BUN 9 08/09/2021 12:19 PM    BUN (POC) 9 06/06/2010 12:05 PM     Lab Results   Component Value Date/Time    Potassium 3.9 08/09/2021 12:19 PM     Lab Results   Component Value Date/Time    Hemoglobin A1c 5.9 (H) 08/09/2021 12:19 PM     Lab Results   Component Value Date/Time    Hemoglobin (POC) 11.2 (L) 06/06/2010 12:05 PM    HGB 12.4 08/09/2021 12:19 PM     Lab Results   Component Value Date/Time    PLATELET 828 (H) 97/40/4312 12:19 PM       Reviewed:  Past Medical History:   Diagnosis Date    Congestive heart disease ([facility]     Endocrine disease     Grave's Disease    Other ill-defined conditions(799.89)     migraines    Other ill-defined conditions(799.89)     graves    Thyroid disease      Social History     Tobacco Use   Smoking Status Current Every Day Smoker    Packs/day: 0.25   Smokeless Tobacco Never Used     Social History     Substance and Sexual Activity   Alcohol Use Yes    Comment: rarely     Allergies   Allergen Reactions    Sulfamethoxazole-Trimethoprim Hives     Other reaction(s): Weal (disorder), swelling       Current Outpatient Medications   Medication Sig    Entresto 49-51 mg tab tablet Take 1 Tablet by mouth every twelve (12) hours.  spironolactone (ALDACTONE) 25 mg tablet Take 2 Tablets by mouth daily.  levothyroxine (SYNTHROID) 50 mcg tablet Take 1 Tablet by mouth daily.  pantoprazole (PROTONIX) 20 mg tablet 90TAKE 1 TABLET BY MOUTH ONCE DAILY FOR 90 DAYS    atorvastatin (LIPITOR) 20 mg tablet Take 1 Tablet by mouth daily.  furosemide (LASIX) 80 mg tablet Take 1 Tablet by mouth two (2) times a day.     ergocalciferol (ERGOCALCIFEROL) 1,250 mcg (50,000 unit) capsule TAKE 1 CAPSULE BY MOUTH EVERY 30 DAYS    hydrALAZINE (APRESOLINE) 25 mg tablet TAKE 1 TABLET BY MOUTH THREE TIMES DAILY FOR 90 DAYS (Patient not taking: Reported on 9/7/2021)    potassium chloride (K-DUR, KLOR-CON) 20 mEq tablet TAKE 1 TABLET BY MOUTH ONCE DAILY FOR 90 DAYS (Patient not taking: Reported on 9/7/2021)    furosemide (LASIX) 40 mg tablet 80 mg two (2) times a day. No current facility-administered medications for this visit.        Duane Cifuentes MD  [facility] heart and Vascular [place]  [address] [facility] [address]  [facility], [address] [facility] [address]

## 2021-12-12 DIAGNOSIS — I50.9 CHRONIC CONGESTIVE HEART FAILURE, UNSPECIFIED HEART FAILURE TYPE (HCC): ICD-10-CM

## 2021-12-14 DIAGNOSIS — I50.9 CHRONIC CONGESTIVE HEART FAILURE, UNSPECIFIED HEART FAILURE TYPE (HCC): Primary | ICD-10-CM

## 2021-12-14 RX ORDER — ERGOCALCIFEROL 1.25 MG/1
CAPSULE ORAL
Qty: 4 CAPSULE | Refills: 2 | Status: SHIPPED | OUTPATIENT
Start: 2021-12-14 | End: 2022-03-03

## 2021-12-14 RX ORDER — TORSEMIDE 20 MG/1
40 TABLET ORAL DAILY
Qty: 60 TABLET | Refills: 5 | Status: SHIPPED | OUTPATIENT
Start: 2021-12-14 | End: 2022-04-28

## 2021-12-15 ENCOUNTER — OFFICE VISIT (OUTPATIENT)
Dept: CARDIOLOGY CLINIC | Age: 41
End: 2021-12-15
Payer: MEDICARE

## 2021-12-15 DIAGNOSIS — Z95.810 AUTOMATIC IMPLANTABLE CARDIAC DEFIBRILLATOR IN SITU: Primary | ICD-10-CM

## 2021-12-15 PROCEDURE — 93295 DEV INTERROG REMOTE 1/2/MLT: CPT | Performed by: INTERNAL MEDICINE

## 2021-12-15 NOTE — LETTER
12/15/2021 10:58 AM    Ms. Trevor Lindsey  [address] [address]          This letter confirms that we have received your scheduled remote check of your implanted     device on 12-15-21. Our EP team will contact you via phone if there are significant abnormal    findings. Your next remote check from home is scheduled for 3-21-22 . If you have any questions, please call [address] at [phone].                Sincerely,        Francisco Dunn MD [facility]

## 2021-12-17 ENCOUNTER — DOCUMENTATION ONLY (OUTPATIENT)
Dept: CARDIOLOGY CLINIC | Age: 41
End: 2021-12-17

## 2021-12-17 NOTE — PROGRESS NOTES
ICD check with 23 NSVT 1-8 seconds  optivol elevation   Has appt with Dr Ina Ardon next month  Try toprol 25 mg every day    Future Appointments   Date Time Provider Darien Candie   1/10/2022  9:00 AM VASCULAR, CECIL BURCH AMB   1/10/2022  9:40 AM MD SCARLETT Murray AMB   3/21/2022  2:00 PM REMOTE1, CECIL BURCH AMB   6/27/2022 10:45 AM REMOTE1, CECIL BURCH AMB   10/4/2022  9:20 AM PACEMAKER3, CECIL PANTOJA BS AMB       Current Outpatient Medications on File Prior to Visit   Medication Sig Dispense Refill    ergocalciferol (ERGOCALCIFEROL) 1,250 mcg (50,000 unit) capsule TAKE 1 CAPSULE BY MOUTH EVERY 30 DAYS. 4 Capsule 2    torsemide (DEMADEX) 20 mg tablet Take 2 Tablets by mouth daily. 60 Tablet 5    sacubitriL-valsartan (Entresto) 24-26 mg tablet Take 1 Tablet by mouth two (2) times a day. 180 Tablet 1    spironolactone (ALDACTONE) 25 mg tablet Take 2 Tablets by mouth daily. 180 Tablet 1    levothyroxine (SYNTHROID) 50 mcg tablet Take 1 Tablet by mouth daily. 90 Tablet 1    pantoprazole (PROTONIX) 20 mg tablet 90TAKE 1 TABLET BY MOUTH ONCE DAILY FOR 90 DAYS 90 Tablet 1    atorvastatin (LIPITOR) 20 mg tablet Take 1 Tablet by mouth daily. 90 Tablet 1     No current facility-administered medications on file prior to visit.

## 2021-12-20 ENCOUNTER — TELEPHONE (OUTPATIENT)
Dept: CARDIOLOGY CLINIC | Age: 41
End: 2021-12-20

## 2021-12-20 NOTE — TELEPHONE ENCOUNTER
Per Dr. Deisy Rivera: \"ICD check with 23 NSVT 1-8 seconds  optivol elevation   Has appt with Dr Zoe Espitia next month  Try toprol 25 mg every day\"    Verified patient with two types of identifiers. Discussed MD findings and recommendation with patient. Ms. Nora Chang states she will not take metoprolol because last time she took it, she felt dizzy. Told patient I will send message to Regina PACHECO to discuss another option, and we will reach out to her again. Patient verbalized understanding.

## 2021-12-20 NOTE — TELEPHONE ENCOUNTER
The only beta blocker that we have her on record as taking is atenolol. We recommend that she try the Toprol XL 25 mg po daily, but if she refuses, could try carvedilol 3.125 mg po bid instead.

## 2021-12-20 NOTE — TELEPHONE ENCOUNTER
Spoke to patient. Automatic transmission came over on 12-15-21. Explained to patient how remote checks work. She had no further questions. ..

## 2021-12-20 NOTE — TELEPHONE ENCOUNTER
Patient was calling because she saw in her [facility] that it said she had and ICD done and she was wondering if it's any thing she needs to do on her behalf.     [phone]

## 2021-12-21 RX ORDER — CARVEDILOL 3.12 MG/1
3.12 TABLET ORAL 2 TIMES DAILY WITH MEALS
Qty: 30 TABLET | Refills: 1 | Status: SHIPPED | OUTPATIENT
Start: 2021-12-21 | End: 2022-01-03

## 2021-12-21 NOTE — TELEPHONE ENCOUNTER
Verified patient with two types of identifiers. Called pt to follow up on previous conversation regarding recommendation for beta blocker. She states she did previously take metoprolol prescribed by a doctor at [facility]. She is agreeable to try carvedilol. Sending to pharmacy for patient to . Patient verbalized understanding and will call with any other questions.

## 2022-01-10 ENCOUNTER — OFFICE VISIT (OUTPATIENT)
Dept: CARDIOLOGY CLINIC | Age: 42
End: 2022-01-10
Payer: MEDICARE

## 2022-01-10 ENCOUNTER — ANCILLARY PROCEDURE (OUTPATIENT)
Dept: CARDIOLOGY CLINIC | Age: 42
End: 2022-01-10
Payer: MEDICARE

## 2022-01-10 VITALS
WEIGHT: 282 LBS | HEIGHT: 68 IN | OXYGEN SATURATION: 98 % | BODY MASS INDEX: 42.74 KG/M2 | DIASTOLIC BLOOD PRESSURE: 72 MMHG | RESPIRATION RATE: 20 BRPM | HEART RATE: 78 BPM | SYSTOLIC BLOOD PRESSURE: 104 MMHG

## 2022-01-10 VITALS — BODY MASS INDEX: 42.74 KG/M2 | HEIGHT: 68 IN | WEIGHT: 282 LBS

## 2022-01-10 DIAGNOSIS — I34.0 NONRHEUMATIC MITRAL VALVE REGURGITATION: ICD-10-CM

## 2022-01-10 DIAGNOSIS — I42.8 NON-ISCHEMIC CARDIOMYOPATHY (HCC): ICD-10-CM

## 2022-01-10 DIAGNOSIS — Z87.891 HISTORY OF TOBACCO USE: ICD-10-CM

## 2022-01-10 DIAGNOSIS — I50.9 CHRONIC CONGESTIVE HEART FAILURE, UNSPECIFIED HEART FAILURE TYPE (HCC): ICD-10-CM

## 2022-01-10 DIAGNOSIS — Z95.810 AUTOMATIC IMPLANTABLE CARDIAC DEFIBRILLATOR IN SITU: ICD-10-CM

## 2022-01-10 DIAGNOSIS — R60.0 EDEMA, LOWER EXTREMITY: Primary | ICD-10-CM

## 2022-01-10 DIAGNOSIS — I10 BENIGN ESSENTIAL HTN: ICD-10-CM

## 2022-01-10 LAB
ECHO LV E' LATERAL VELOCITY: 12 CM/S
ECHO LV E' SEPTAL VELOCITY: 6 CM/S
ECHO LV EDV A2C: 105 ML
ECHO LV EDV A4C: 104 ML
ECHO LV EDV BP: 108 ML (ref 56–104)
ECHO LV EDV INDEX A4C: 44 ML/M2
ECHO LV EDV INDEX BP: 46 ML/M2
ECHO LV EDV NDEX A2C: 44 ML/M2
ECHO LV EJECTION FRACTION A2C: 45 %
ECHO LV EJECTION FRACTION A4C: 44 %
ECHO LV EJECTION FRACTION BIPLANE: 43 % (ref 55–100)
ECHO LV ESV A2C: 58 ML
ECHO LV ESV A4C: 59 ML
ECHO LV ESV BP: 61 ML (ref 19–49)
ECHO LV ESV INDEX A2C: 24 ML/M2
ECHO LV ESV INDEX A4C: 25 ML/M2
ECHO LV ESV INDEX BP: 26 ML/M2
ECHO LV FRACTIONAL SHORTENING: 19 % (ref 28–44)
ECHO LV GLOBAL LONGITUDINAL STRAIN (GLS): -16.1 %
ECHO LV INTERNAL DIMENSION DIASTOLE INDEX: 2.03 CM/M2
ECHO LV INTERNAL DIMENSION DIASTOLIC: 4.8 CM (ref 3.9–5.3)
ECHO LV INTERNAL DIMENSION SYSTOLIC INDEX: 1.65 CM/M2
ECHO LV INTERNAL DIMENSION SYSTOLIC: 3.9 CM
ECHO LV IVSD: 1.2 CM (ref 0.6–0.9)
ECHO LV MASS 2D: 232.2 G (ref 67–162)
ECHO LV MASS INDEX 2D: 98 G/M2 (ref 43–95)
ECHO LV POSTERIOR WALL DIASTOLIC: 1.3 CM (ref 0.6–0.9)
ECHO LV RELATIVE WALL THICKNESS RATIO: 0.54
ECHO MV A VELOCITY: 1.26 M/S
ECHO MV E DECELERATION TIME (DT): 293.1 MS
ECHO MV E VELOCITY: 1.29 M/S
ECHO MV E/A RATIO: 1.02
ECHO MV E/E' LATERAL: 10.75
ECHO MV E/E' RATIO (AVERAGED): 16.13
ECHO MV E/E' SEPTAL: 21.5

## 2022-01-10 PROCEDURE — G8427 DOCREV CUR MEDS BY ELIG CLIN: HCPCS | Performed by: INTERNAL MEDICINE

## 2022-01-10 PROCEDURE — 99214 OFFICE O/P EST MOD 30 MIN: CPT | Performed by: INTERNAL MEDICINE

## 2022-01-10 PROCEDURE — G8417 CALC BMI ABV UP PARAM F/U: HCPCS | Performed by: INTERNAL MEDICINE

## 2022-01-10 PROCEDURE — 93308 TTE F-UP OR LMTD: CPT | Performed by: INTERNAL MEDICINE

## 2022-01-10 PROCEDURE — G8752 SYS BP LESS 140: HCPCS | Performed by: INTERNAL MEDICINE

## 2022-01-10 PROCEDURE — G8754 DIAS BP LESS 90: HCPCS | Performed by: INTERNAL MEDICINE

## 2022-01-10 PROCEDURE — G8510 SCR DEP NEG, NO PLAN REQD: HCPCS | Performed by: INTERNAL MEDICINE

## 2022-01-10 PROCEDURE — G0463 HOSPITAL OUTPT CLINIC VISIT: HCPCS | Performed by: INTERNAL MEDICINE

## 2022-01-10 RX ORDER — AMOXICILLIN 500 MG/1
TABLET, FILM COATED ORAL
COMMUNITY
Start: 2021-12-06 | End: 2022-04-06

## 2022-01-10 NOTE — PROGRESS NOTES
[facility] [place]: Jacinto Ko  [phone]    History of Present Illness:  Ms. Gurmeet Victor is a 36 yo F with h/o non ischemic cardiomyopathy (cath 2018 and 2019 with no significant CAD; mild LAD/RCA plaquing) EF of 35-40% by echo 2021 (as low as 13% in past per pt), severe MR s/p MV repair 2018, status post [employer] ICD in 01/2021, HTN, mixed hyperlipidemia, h/o hyperthyroid, Grave's disease (treated with radiation isotope therapy in 2019). Was under consideration for LVAD/transplant at one point and followed by CHF clinic at [facility]. Cardiac MRI in 01/2018 with ejection fraction of 40% and findings of dilated nonischemic cardiomyopathy. Right heart catheterization in 2018, 2019 and 2020. In 08/2018 was status post mitral valve repair with just mild to moderate mitral regurgitation thereafter. Her echocardiograms in 2018 and 2019 through 2021 overall demonstrated ejection fraction between 35-40%. Grave's disease was treated with radiation isotope therapy in 2019. Since her last visit, she does feel about the same. She does have some baseline shortness of breath, but this is unchanged. No exertional chest pain. We did up her Spironolactone to 50 mg twice a day, however, she is still retaining fluid. On exam, she has clear lungs, but chronic lower extremity edema. Her echocardiogram today checked out well with ejection fraction of 40-45% (had for the most part been in the 35-40% range) and we discussed the results. Soc hx. Tobacco 3-4 days 1 pack  Fam hx. Dad side of family. Assessment and Plan:   1. Shortness of breath, lower extremity edema. Her echocardiogram demonstrated slightly decreased LV systolic function, but this is unchanged from her prior studies. A lot of her lower extremity edema I do think is venous insufficiency. For now, she has been on Torsemide and Spironolactone and will have her continue this.   She is interested in seeing a vascular specialist, possibly getting compression therapy and will have her see vascular. Will tentatively have her follow back here in six months. 2. Tobacco use. Encouraged cessation. 3. ICD. NSVT. She was recently started on Coreg after brief episodes of non-sustained ventricular tachycardia were noted on her ICD check. Followed by the device clinic. 4. Mixed hyperlipidemia. Tolerating statin. She  has a past medical history of Congestive heart disease ([address] Endocrine disease, Other ill-defined conditions(799.89), Other ill-defined conditions(799.89), and Thyroid disease. All other systems negative except as above. PE  Vitals:    01/10/22 0925   BP: 104/72   Pulse: 78   Resp: 20   SpO2: 98%   Weight: 282 lb (127.9 kg)   Height: 5' 8\" (1.727 m)    Body mass index is 42.88 kg/m².    General appearance - alert, well appearing, and in no distress  Mental status - affect appropriate to mood  Eyes - sclera anicteric, moist mucous membranes  Neck - supple, no JVD  Chest - clear to auscultation, no wheezes, rales or rhonchi  Heart - normal rate, regular rhythm, normal S1, S2, I/VI systolic murmur LUSB  Abdomen - soft, nontender, nondistended, no masses or organomegaly  Neurological -no focal deficit  Extremities - peripheral pulses normal, no pedal edema      Recent Labs:  Lab Results   Component Value Date/Time    Cholesterol, total 244 (H) 08/09/2021 12:19 PM    HDL Cholesterol 40 08/09/2021 12:19 PM    LDL, calculated 170.4 (H) 08/09/2021 12:19 PM    Triglyceride 168 (H) 08/09/2021 12:19 PM    CHOL/HDL Ratio 6.1 (H) 08/09/2021 12:19 PM     Lab Results   Component Value Date/Time    Creatinine (POC) 0.4 (L) 06/06/2010 12:05 PM    Creatinine 0.97 12/20/2021 11:40 AM     Lab Results   Component Value Date/Time    BUN 11 12/20/2021 11:40 AM    BUN (POC) 9 06/06/2010 12:05 PM     Lab Results   Component Value Date/Time    Potassium 3.4 (L) 12/20/2021 11:40 AM     Lab Results   Component Value Date/Time    Hemoglobin A1c 5.9 (H) 08/09/2021 12:19 PM     Lab Results   Component Value Date/Time    Hemoglobin (POC) 11.2 (L) 06/06/2010 12:05 PM    HGB 12.4 08/09/2021 12:19 PM     Lab Results   Component Value Date/Time    PLATELET 709 (H) 68/34/3921 12:19 PM       Reviewed:  Past Medical History:   Diagnosis Date    Congestive heart disease ([facility]     Endocrine disease     Grave's Disease    Other ill-defined conditions(799.89)     migraines    Other ill-defined conditions(799.89)     graves    Thyroid disease      Social History     Tobacco Use   Smoking Status Current Every Day Smoker    Packs/day: 0.25    Types: Cigarettes   Smokeless Tobacco Never Used   Tobacco Comment    4 cig a day     Social History     Substance and Sexual Activity   Alcohol Use Yes    Comment: rarely     Allergies   Allergen Reactions    Sulfamethoxazole-Trimethoprim Hives     Other reaction(s): Weal (disorder), swelling       Current Outpatient Medications   Medication Sig    amoxicillin 500 mg tab Before Dental    ergocalciferol (ERGOCALCIFEROL) 1,250 mcg (50,000 unit) capsule TAKE 1 CAPSULE BY MOUTH EVERY 30 DAYS.  torsemide (DEMADEX) 20 mg tablet Take 2 Tablets by mouth daily.  sacubitriL-valsartan (Entresto) 24-26 mg tablet Take 1 Tablet by mouth two (2) times a day.  spironolactone (ALDACTONE) 25 mg tablet Take 2 Tablets by mouth daily.  levothyroxine (SYNTHROID) 50 mcg tablet Take 1 Tablet by mouth daily.  pantoprazole (PROTONIX) 20 mg tablet 90TAKE 1 TABLET BY MOUTH ONCE DAILY FOR 90 DAYS    atorvastatin (LIPITOR) 20 mg tablet Take 1 Tablet by mouth daily.  carvediloL (COREG) 3.125 mg tablet TAKE 1 TABLET BY MOUTH TWICE A DAY WITH MEALS (Patient not taking: Reported on 1/10/2022)     No current facility-administered medications for this visit.        Jessy Luz MD  [facility]  [address], [address] [facility] [address]  [address]

## 2022-02-21 DIAGNOSIS — I50.9 CHRONIC CONGESTIVE HEART FAILURE, UNSPECIFIED HEART FAILURE TYPE (HCC): ICD-10-CM

## 2022-02-21 RX ORDER — SPIRONOLACTONE 50 MG/1
50 TABLET, FILM COATED ORAL 2 TIMES DAILY
Qty: 180 TABLET | Refills: 1 | Status: SHIPPED | OUTPATIENT
Start: 2022-02-21 | End: 2022-08-16

## 2022-02-21 NOTE — PROGRESS NOTES
Request for Spironolactone 50 mg BID. Last office visit 1/10/22, next office visit 7/11/22.  Refills per verbal order from Dr. Ami Ortega.

## 2022-03-02 DIAGNOSIS — I50.9 CHRONIC CONGESTIVE HEART FAILURE, UNSPECIFIED HEART FAILURE TYPE (HCC): ICD-10-CM

## 2022-03-03 RX ORDER — ERGOCALCIFEROL 1.25 MG/1
CAPSULE ORAL
Qty: 12 CAPSULE | Refills: 3 | Status: SHIPPED | OUTPATIENT
Start: 2022-03-03

## 2022-03-09 ENCOUNTER — OFFICE VISIT (OUTPATIENT)
Dept: FAMILY MEDICINE CLINIC | Age: 42
End: 2022-03-09
Payer: MEDICARE

## 2022-03-09 VITALS
DIASTOLIC BLOOD PRESSURE: 75 MMHG | SYSTOLIC BLOOD PRESSURE: 107 MMHG | HEIGHT: 68 IN | HEART RATE: 86 BPM | OXYGEN SATURATION: 98 % | BODY MASS INDEX: 43.89 KG/M2 | WEIGHT: 289.6 LBS | RESPIRATION RATE: 18 BRPM | TEMPERATURE: 97 F

## 2022-03-09 DIAGNOSIS — I50.9 CHRONIC CONGESTIVE HEART FAILURE, UNSPECIFIED HEART FAILURE TYPE (HCC): ICD-10-CM

## 2022-03-09 DIAGNOSIS — E66.01 OBESITY, CLASS III, BMI 40-49.9 (MORBID OBESITY) (HCC): ICD-10-CM

## 2022-03-09 DIAGNOSIS — R07.9 CHEST PAIN, UNSPECIFIED TYPE: Primary | ICD-10-CM

## 2022-03-09 DIAGNOSIS — E78.2 MIXED HYPERLIPIDEMIA: ICD-10-CM

## 2022-03-09 DIAGNOSIS — Z86.39 HISTORY OF GRAVES' DISEASE: ICD-10-CM

## 2022-03-09 LAB
ALBUMIN SERPL-MCNC: 4 G/DL (ref 3.5–5)
ALBUMIN/GLOB SERPL: 0.9 {RATIO} (ref 1.1–2.2)
ALP SERPL-CCNC: 101 U/L (ref 45–117)
ALT SERPL-CCNC: 33 U/L (ref 12–78)
AMYLASE SERPL-CCNC: 72 U/L (ref 25–115)
ANION GAP SERPL CALC-SCNC: 4 MMOL/L (ref 5–15)
AST SERPL-CCNC: 24 U/L (ref 15–37)
BILIRUB SERPL-MCNC: 0.3 MG/DL (ref 0.2–1)
BUN SERPL-MCNC: 11 MG/DL (ref 6–20)
BUN/CREAT SERPL: 10 (ref 12–20)
CALCIUM SERPL-MCNC: 9.5 MG/DL (ref 8.5–10.1)
CHLORIDE SERPL-SCNC: 101 MMOL/L (ref 97–108)
CHOLEST SERPL-MCNC: 131 MG/DL
CO2 SERPL-SCNC: 29 MMOL/L (ref 21–32)
CREAT SERPL-MCNC: 1.15 MG/DL (ref 0.55–1.02)
ERYTHROCYTE [DISTWIDTH] IN BLOOD BY AUTOMATED COUNT: 16.8 % (ref 11.5–14.5)
GLOBULIN SER CALC-MCNC: 4.4 G/DL (ref 2–4)
GLUCOSE SERPL-MCNC: 140 MG/DL (ref 65–100)
HCT VFR BLD AUTO: 40.1 % (ref 35–47)
HDLC SERPL-MCNC: 41 MG/DL
HDLC SERPL: 3.2 {RATIO} (ref 0–5)
HGB BLD-MCNC: 12.4 G/DL (ref 11.5–16)
LDLC SERPL CALC-MCNC: 74.2 MG/DL (ref 0–100)
LIPASE SERPL-CCNC: 113 U/L (ref 73–393)
MCH RBC QN AUTO: 29 PG (ref 26–34)
MCHC RBC AUTO-ENTMCNC: 30.9 G/DL (ref 30–36.5)
MCV RBC AUTO: 93.7 FL (ref 80–99)
NRBC # BLD: 0 K/UL (ref 0–0.01)
NRBC BLD-RTO: 0 PER 100 WBC
PLATELET # BLD AUTO: 393 K/UL (ref 150–400)
PMV BLD AUTO: 9.3 FL (ref 8.9–12.9)
POTASSIUM SERPL-SCNC: 4.5 MMOL/L (ref 3.5–5.1)
PROT SERPL-MCNC: 8.4 G/DL (ref 6.4–8.2)
RBC # BLD AUTO: 4.28 M/UL (ref 3.8–5.2)
SODIUM SERPL-SCNC: 134 MMOL/L (ref 136–145)
T4 FREE SERPL-MCNC: 1 NG/DL (ref 0.8–1.5)
TRIGL SERPL-MCNC: 79 MG/DL (ref ?–150)
TSH SERPL DL<=0.05 MIU/L-ACNC: 3.52 UIU/ML (ref 0.36–3.74)
VLDLC SERPL CALC-MCNC: 15.8 MG/DL
WBC # BLD AUTO: 10.9 K/UL (ref 3.6–11)

## 2022-03-09 PROCEDURE — G8417 CALC BMI ABV UP PARAM F/U: HCPCS | Performed by: NURSE PRACTITIONER

## 2022-03-09 PROCEDURE — G8752 SYS BP LESS 140: HCPCS | Performed by: NURSE PRACTITIONER

## 2022-03-09 PROCEDURE — 99214 OFFICE O/P EST MOD 30 MIN: CPT | Performed by: NURSE PRACTITIONER

## 2022-03-09 PROCEDURE — G8432 DEP SCR NOT DOC, RNG: HCPCS | Performed by: NURSE PRACTITIONER

## 2022-03-09 PROCEDURE — G8427 DOCREV CUR MEDS BY ELIG CLIN: HCPCS | Performed by: NURSE PRACTITIONER

## 2022-03-09 PROCEDURE — G8754 DIAS BP LESS 90: HCPCS | Performed by: NURSE PRACTITIONER

## 2022-03-09 NOTE — PROGRESS NOTES
[address] [facility] Note  Subjective:      Jenn Fernandez is a 38 y.o. female who presents for chest pain on her right side of chest X 2 weeks. Pain is sharp to dull, better when not thinking about it. She has history of CHF-- she is followed by cardiologist- thyroid, disease, morbid  obesity. She denies dizziness, SOB and cough, nausea, vomiting     Past Medical History:   Diagnosis Date    Congestive heart disease ([facility]     Endocrine disease     Grave's Disease    Other ill-defined conditions(799.89)     migraines    Other ill-defined conditions(799.89)     graves    Thyroid disease      Past Surgical History:   Procedure Laterality Date    HX HYSTERECTOMY  2012    HX MITRAL VALVE REPLACEMENT  2018    HX OTHER SURGICAL      removal of ink pen from abdomen    HX PACEMAKER  2021       Current Outpatient Medications   Medication Sig Dispense Refill    ergocalciferol (ERGOCALCIFEROL) 1,250 mcg (50,000 unit) capsule TAKE 1 CAPSULE BY MOUTH EVERY 30 DAYS. 12 Capsule 3    spironolactone (ALDACTONE) 50 mg tablet Take 1 Tablet by mouth two (2) times a day. 180 Tablet 1    torsemide (DEMADEX) 20 mg tablet Take 2 Tablets by mouth daily. 60 Tablet 5    sacubitriL-valsartan (Entresto) 24-26 mg tablet Take 1 Tablet by mouth two (2) times a day. 180 Tablet 1    levothyroxine (SYNTHROID) 50 mcg tablet Take 1 Tablet by mouth daily. 90 Tablet 1    pantoprazole (PROTONIX) 20 mg tablet 90TAKE 1 TABLET BY MOUTH ONCE DAILY FOR 90 DAYS 90 Tablet 1    atorvastatin (LIPITOR) 20 mg tablet Take 1 Tablet by mouth daily.  90 Tablet 1    amoxicillin 500 mg tab Before Dental (Patient not taking: Reported on 3/9/2022)      carvediloL (COREG) 3.125 mg tablet TAKE 1 TABLET BY MOUTH TWICE A DAY WITH MEALS (Patient not taking: Reported on 1/10/2022) 60 Tablet 5     Allergies   Allergen Reactions    Sulfamethoxazole-Trimethoprim Hives     Other reaction(s): Weal (disorder), swelling       ROS:   Complete review of systems was reviewed with pertinent information listed in HPI. Review of Systems   Constitutional: Negative. HENT: Negative. Respiratory: Negative. Cardiovascular: Positive for chest pain. Gastrointestinal: Negative. Genitourinary: Negative. Musculoskeletal: Negative. Objective:     Visit Vitals  /75 (BP 1 Location: Left upper arm, BP Patient Position: Sitting, BP Cuff Size: Large adult)   Pulse 86   Temp 97 °F (36.1 °C) (Temporal)   Resp 18   Ht 5' 8\" (1.727 m)   Wt 289 lb 9.6 oz (131.4 kg)   LMP 02/17/2011   SpO2 98%   BMI 44.03 kg/m²       Vitals and Nurse Documentation reviewed. Physical Exam  Constitutional:       Appearance: Normal appearance. She is obese. HENT:      Mouth/Throat:      Mouth: Mucous membranes are moist.   Cardiovascular:      Rate and Rhythm: Normal rate and regular rhythm. Pulses: Normal pulses. Heart sounds: Normal heart sounds. Pulmonary:      Effort: Pulmonary effort is normal.      Breath sounds: Normal breath sounds. Comments: Tender in anterior aspect of right lower ribs  Abdominal:      General: Bowel sounds are normal.      Palpations: Abdomen is soft. Musculoskeletal:      Cervical back: Normal range of motion and neck supple. Neurological:      Mental Status: She is alert. Assessment/Plan:     Diagnoses and all orders for this visit:    1. Chest pain, unspecified type  -     CBC W/O DIFF; Future    2. Mixed hyperlipidemia  -     LIPID PANEL; Future  -     METABOLIC PANEL, COMPREHENSIVE; Future  -     AMYLASE; Future  -     LIPASE; Future    3. Chronic congestive heart failure, unspecified heart failure type ([address]    4. History of Graves' disease  -     TSH 3RD GENERATION; Future  -     T4, FREE; Future    5.  Obesity, Class III, BMI 40-49.9 (morbid obesity) ([address]          Pt expressed understanding with the diagnosis and plan        Discussed expected course/resolution/complications of diagnosis in detail with patient.    Medication risks/benefits/costs/interactions/alternatives discussed with patient.    Pt was given an after visit summary which includes diagnoses, current medications & vitals.  Pt expressed understanding with the diagnosis and plan

## 2022-03-09 NOTE — PROGRESS NOTES
1. \"Have you been to the ER, urgent care clinic since your last visit? Hospitalized since your last visit? \" No    2. \"Have you seen or consulted any other health care providers outside of the [address] [place] [address] since your last visit? \" No     3. For patients aged 39-70: Has the patient had a colonoscopy / FIT/ Cologuard? No      If the patient is female:    4. For patients aged 41-77: Has the patient had a mammogram within the past 2 years? Yes - no Care Gap present      5. For patients aged 21-65: Has the patient had a pap smear?  No

## 2022-03-19 PROBLEM — Z72.0 TOBACCO ABUSE: Status: ACTIVE | Noted: 2021-08-10

## 2022-03-19 PROBLEM — I50.9 CHRONIC CONGESTIVE HEART FAILURE (HCC): Status: ACTIVE | Noted: 2021-08-10

## 2022-03-20 PROBLEM — E66.01 OBESITY, MORBID (HCC): Status: ACTIVE | Noted: 2019-09-27

## 2022-03-21 ENCOUNTER — OFFICE VISIT (OUTPATIENT)
Dept: CARDIOLOGY CLINIC | Age: 42
End: 2022-03-21
Payer: MEDICARE

## 2022-03-21 DIAGNOSIS — Z95.810 AUTOMATIC IMPLANTABLE CARDIAC DEFIBRILLATOR IN SITU: Primary | ICD-10-CM

## 2022-03-21 PROCEDURE — 93296 REM INTERROG EVL PM/IDS: CPT | Performed by: INTERNAL MEDICINE

## 2022-03-21 PROCEDURE — 93295 DEV INTERROG REMOTE 1/2/MLT: CPT | Performed by: INTERNAL MEDICINE

## 2022-03-21 NOTE — LETTER
3/22/2022 7:46 AM    Ms. Dmitri Villela  [address] [place] [address],[facility] Floor [facility] access hospital            This letter confirms that we have received your scheduled remote check of your implanted     device on 3-21-22  . Our EP team will contact you via phone if there are significant abnormal    findings. Your next remote check from home is scheduled for 6-27-22  . If you have any questions, please call [address] [facility] at [phone].                Sincerely,    Cindy Gonzalez MD [facility]

## 2022-04-06 RX ORDER — SACUBITRIL AND VALSARTAN 24; 26 MG/1; MG/1
1 TABLET, FILM COATED ORAL 2 TIMES DAILY
Qty: 180 TABLET | Refills: 1 | Status: SHIPPED | OUTPATIENT
Start: 2022-04-06

## 2022-04-06 RX ORDER — AMOXICILLIN 500 MG/1
2000 CAPSULE ORAL AS NEEDED
Qty: 4 CAPSULE | Refills: 2 | Status: SHIPPED | OUTPATIENT
Start: 2022-04-06 | End: 2022-05-24 | Stop reason: ALTCHOICE

## 2022-04-06 NOTE — TELEPHONE ENCOUNTER
Request for Entresto 24-26 mg BID. Last office visit 1/10/22, next office visit 7/11/22. Refills per verbal order from Dr. Jean-Paul Tillman.      Will verify with Alton Hurley NP regarding dosage for antibiotics

## 2022-04-06 NOTE — TELEPHONE ENCOUNTER
Patient is requesting a refill, patient is out of these meds  Patient is also calling to request antibiotics for dental procedures on 04/07/2022 and 04/11/2022  Dr. Ping Montano    [phone]    [facility] pharmacy  [phone]            FPORM:[phone]

## 2022-04-06 NOTE — TELEPHONE ENCOUNTER
Per Joe Karimi NP \"Amoxicillin 2grams 30 min before dental work, I usually give 2 refills\"    Refill sent to pharmacy.

## 2022-04-28 DIAGNOSIS — R60.0 EDEMA, LOWER EXTREMITY: ICD-10-CM

## 2022-04-28 DIAGNOSIS — I50.9 CHRONIC CONGESTIVE HEART FAILURE, UNSPECIFIED HEART FAILURE TYPE (HCC): Primary | ICD-10-CM

## 2022-04-28 DIAGNOSIS — Z86.39 HISTORY OF GRAVES' DISEASE: ICD-10-CM

## 2022-04-28 RX ORDER — TORSEMIDE 20 MG/1
40 TABLET ORAL DAILY
Qty: 180 TABLET | Refills: 3 | Status: SHIPPED | OUTPATIENT
Start: 2022-04-28

## 2022-04-28 NOTE — TELEPHONE ENCOUNTER
Per VO by MD.    Future Appointments   Date Time Provider Darien Candie   6/27/2022 10:45 AM Ti PANTOJA BS AMB   7/11/2022  9:20 AM Marrie Mohs, MD CAVREY BS AMB   10/4/2022  9:20 AM CECIL BLACKMAN BS AMB

## 2022-05-02 RX ORDER — LEVOTHYROXINE SODIUM 50 UG/1
TABLET ORAL
Qty: 90 TABLET | Refills: 1 | Status: SHIPPED | OUTPATIENT
Start: 2022-05-02 | End: 2022-10-31

## 2022-05-16 DIAGNOSIS — I50.9 CHRONIC CONGESTIVE HEART FAILURE, UNSPECIFIED HEART FAILURE TYPE (HCC): ICD-10-CM

## 2022-05-17 RX ORDER — PANTOPRAZOLE SODIUM 20 MG/1
TABLET, DELAYED RELEASE ORAL
Qty: 90 TABLET | Refills: 1 | Status: SHIPPED | OUTPATIENT
Start: 2022-05-17

## 2022-05-24 ENCOUNTER — OFFICE VISIT (OUTPATIENT)
Dept: FAMILY MEDICINE CLINIC | Age: 42
End: 2022-05-24
Payer: MEDICARE

## 2022-05-24 VITALS
SYSTOLIC BLOOD PRESSURE: 110 MMHG | HEART RATE: 83 BPM | RESPIRATION RATE: 16 BRPM | BODY MASS INDEX: 43.56 KG/M2 | HEIGHT: 68 IN | TEMPERATURE: 97.9 F | OXYGEN SATURATION: 99 % | DIASTOLIC BLOOD PRESSURE: 71 MMHG | WEIGHT: 287.4 LBS

## 2022-05-24 DIAGNOSIS — F41.1 GAD (GENERALIZED ANXIETY DISORDER): Primary | ICD-10-CM

## 2022-05-24 DIAGNOSIS — M54.6 THORACIC BACK PAIN, UNSPECIFIED BACK PAIN LATERALITY, UNSPECIFIED CHRONICITY: ICD-10-CM

## 2022-05-24 PROBLEM — N18.30 CHRONIC RENAL DISEASE, STAGE III (HCC): Status: ACTIVE | Noted: 2022-05-24

## 2022-05-24 PROCEDURE — G8417 CALC BMI ABV UP PARAM F/U: HCPCS | Performed by: NURSE PRACTITIONER

## 2022-05-24 PROCEDURE — G8427 DOCREV CUR MEDS BY ELIG CLIN: HCPCS | Performed by: NURSE PRACTITIONER

## 2022-05-24 PROCEDURE — G8754 DIAS BP LESS 90: HCPCS | Performed by: NURSE PRACTITIONER

## 2022-05-24 PROCEDURE — 99214 OFFICE O/P EST MOD 30 MIN: CPT | Performed by: NURSE PRACTITIONER

## 2022-05-24 PROCEDURE — G8752 SYS BP LESS 140: HCPCS | Performed by: NURSE PRACTITIONER

## 2022-05-24 PROCEDURE — G8432 DEP SCR NOT DOC, RNG: HCPCS | Performed by: NURSE PRACTITIONER

## 2022-05-24 RX ORDER — DULOXETIN HYDROCHLORIDE 30 MG/1
30 CAPSULE, DELAYED RELEASE ORAL DAILY
Qty: 30 CAPSULE | Refills: 1 | Status: SHIPPED | OUTPATIENT
Start: 2022-05-24 | End: 2022-07-06 | Stop reason: SDUPTHER

## 2022-05-24 NOTE — PROGRESS NOTES
Chief Complaint   Patient presents with    Follow-up    Medication Refill       1. Have you been to the ER, urgent care clinic since your last visit? Hospitalized since your last visit? No    2. Have you seen or consulted any other health care providers outside of the [address] [place] [address] since your last visit? Include any pap smears or colon screening. No    3. For patients over 45: Has the patient had a colonoscopy? NA - based on age     If the patient is female:    4. For patients over 40: Has the patient had a mammogram? Yes - no Care Gap present    5. For patients over 21: Has the patient had a pap smear? Yes - no Care Gap present    3 most recent PHQ Screens 5/24/2022   Little interest or pleasure in doing things Not at all   Feeling down, depressed, irritable, or hopeless Several days   Total Score PHQ 2 1     Abuse Screening Questionnaire 5/24/2022   Do you ever feel afraid of your partner? N   Are you in a relationship with someone who physically or mentally threatens you? N   Is it safe for you to go home? Y       No flowsheet data found. No flowsheet data found.   Health Maintenance Due   Topic Date Due    COVID-19 Vaccine (1) Never done    DTaP/Tdap/Td series (1 - Tdap) Never done    Pneumococcal 0-64 years (2 - PCV) 01/24/2019

## 2022-05-25 DIAGNOSIS — E78.2 MIXED HYPERLIPIDEMIA: ICD-10-CM

## 2022-05-27 RX ORDER — ATORVASTATIN CALCIUM 20 MG/1
TABLET, FILM COATED ORAL
Qty: 90 TABLET | Refills: 1 | Status: SHIPPED | OUTPATIENT
Start: 2022-05-27

## 2022-05-31 NOTE — PROGRESS NOTES
Assessment/Plan:     Diagnoses and all orders for this visit:    1. DAISY (generalized anxiety disorder)  -     DULoxetine (CYMBALTA) 30 mg capsule; Take 1 Capsule by mouth daily. Indications: anxiousness associated with depression, chronic muscle or bone pain  Initiate treatment as above. Follow up in 4 weeks or sooner as needed. 2. Thoracic back pain, unspecified back pain laterality, unspecified chronicity  -     REFERRAL TO PHYSICAL THERAPY         Follow-up and Dispositions    · Return in about 4 weeks (around 6/21/2022) for Follow Up. Discussed expected course/resolution/complications of diagnosis in detail with patient. Medication risks/benefits/costs/interactions/alternatives discussed with patient. Pt was given after visit summary which includes diagnoses, current medications & vitals. Pt expressed understanding with the diagnosis and plan          Subjective:      Javier Adams is a 38 y.o. female who presents for had concerns including Follow-up and Medication Refill. Reports concerns regarding ongoing back pain and anxiety. Symptoms are uncontrolled. Not currently attempting otc treatment. She is followed by cardiology for a history of nonischemic cardiomyopathy. Patient Active Problem List   Diagnosis Code    History of Graves' disease Z86.39    Obesity, morbid ([facility] [address] E66.01    Chronic congestive heart failure ([facility] I50.9    Tobacco abuse Z72.0    Chronic renal disease, stage III N18.30       Current Outpatient Medications   Medication Sig Dispense Refill    DULoxetine (CYMBALTA) 30 mg capsule Take 1 Capsule by mouth daily.  Indications: anxiousness associated with depression, chronic muscle or bone pain 30 Capsule 1    pantoprazole (PROTONIX) 20 mg tablet TAKE 1 TABLET BY MOUTH ONCE DAILY FOR 90 DAYS 90 Tablet 1    levothyroxine (SYNTHROID) 50 mcg tablet TAKE 1 TABLET BY MOUTH EVERY DAY 90 Tablet 1    torsemide (DEMADEX) 20 mg tablet Take 2 Tablets by mouth daily. 180 Tablet 3    sacubitriL-valsartan (Entresto) 24-26 mg tablet Take 1 Tablet by mouth two (2) times a day. 180 Tablet 1    ergocalciferol (ERGOCALCIFEROL) 1,250 mcg (50,000 unit) capsule TAKE 1 CAPSULE BY MOUTH EVERY 30 DAYS. 12 Capsule 3    spironolactone (ALDACTONE) 50 mg tablet Take 1 Tablet by mouth two (2) times a day. 180 Tablet 1    atorvastatin (LIPITOR) 20 mg tablet TAKE 1 TABLET BY MOUTH EVERY DAY 90 Tablet 1       Allergies   Allergen Reactions    Sulfamethoxazole-Trimethoprim Hives     Other reaction(s): Weal (disorder), swelling       ROS:   Review of Systems   Constitutional: Negative for malaise/fatigue. Eyes: Negative for blurred vision. Respiratory: Negative for shortness of breath. Cardiovascular: Negative for chest pain. Musculoskeletal: Positive for back pain. Psychiatric/Behavioral: The patient is nervous/anxious. Objective:     Visit Vitals  /71 (BP 1 Location: Left upper arm, BP Patient Position: Sitting, BP Cuff Size: Large adult long)   Pulse 83   Temp 97.9 °F (36.6 °C) (Temporal)   Resp 16   Ht 5' 8\" (1.727 m)   Wt 287 lb 6.4 oz (130.4 kg)   LMP 02/17/2011   SpO2 99%   BMI 43.70 kg/m²       Vitals and Nurse Documentation reviewed. Physical Exam  Constitutional:       General: She is not in acute distress. Appearance: She is obese. Cardiovascular:      Heart sounds: S1 normal and S2 normal. No murmur heard. No friction rub. No gallop. Pulmonary:      Effort: No respiratory distress. Breath sounds: Normal breath sounds. Skin:     General: Skin is warm and dry.    Psychiatric:         Mood and Affect: Mood and affect normal.

## 2022-06-14 ENCOUNTER — HOSPITAL ENCOUNTER (OUTPATIENT)
Dept: PHYSICAL THERAPY | Age: 42
Discharge: HOME | End: 2022-06-14
Payer: MEDICARE

## 2022-06-14 DIAGNOSIS — M54.6 THORACIC BACK PAIN, UNSPECIFIED BACK PAIN LATERALITY, UNSPECIFIED CHRONICITY: ICD-10-CM

## 2022-06-14 PROCEDURE — 97535 SELF CARE MNGMENT TRAINING: CPT | Performed by: PHYSICAL THERAPIST

## 2022-06-14 PROCEDURE — 97110 THERAPEUTIC EXERCISES: CPT | Performed by: PHYSICAL THERAPIST

## 2022-06-14 PROCEDURE — 97161 PT EVAL LOW COMPLEX 20 MIN: CPT | Performed by: PHYSICAL THERAPIST

## 2022-06-14 NOTE — PROGRESS NOTES
PT INITIAL EVALUATION NOTE - [facility] -15    Patient Name: Norma Glass  Date:2022  : 1980  [x]  Patient  Verified  Payor: AARP MEDICARE COMPLETE / Plan: [facility] MEDICARE COMPLETE / Product Type: Managed Care Medicare /    In time:940 A  Out time:1030 A  Total Treatment Time (min): 50 (35 eval, 15 timed, 0 modality see below)  Total Timed Codes (min): 15   1:1 Treatment Time ([facility]/[place]): 15    Visit #:1    Treatment Area: Thoracic back pain, unspecified back pain laterality, unspecified chronicity [M54.6]    SUBJECTIVE  Any medication changes, allergies to medications, adverse drug reactions, diagnosis change, or new procedure performed?: [] No    [x] Yes (see summary sheet for update)  Date of onset/injury: Pt with onset of thoracic pain that started in 2018 after her heart surgery. Did do cardio PT after, did not help  Has since switched healthcare systems (from [facility] to [place]))  Has been cleared by her Cardiologist to do PT. He does not believe the thoracic pain is coming from her heart. Pain:   7/10 max 0/10 min 0/10 now     Location of symptoms: thoracic area, bilaterally  Description of symptoms: \"like a bad gas pocket, full, like someones blowing up a balloon in my back\"  Aggravated by: raising arms overhead, thoracic rotation, reaching behind back, pt has trouble finding comfortable position to sleep-uses lots of pillows  Eased by: has not tried anything  Prior tests/injections:no lumbar/thoraic imaging. PMH: MVP and surgery, ICD-pacemaker, thyroid  Any weakness in UE/LE's: none  Any tingling/numbness in UE/LE's: none  Recent weight/loss or weight gain: none  Prior tx:none, cardio PT only  Occupation: Not working since her heart surgery in 2018. Would be interested in going back to [employer] a sitting job because she prefers a standing/active job, however cannot handle a lot of lifting.    Prior level of function/activity level: Able to do housework and grocery shopping trips without pain  Patient goal: \"reduce pain\"  Social:lives with . Child nearby who is about 27years old.  does most of housework for her. Spends most of the day sitting-TV/tablet. Exercise: does not belong to a gym. Does have a fitbit to track metrics -sleep, steps, water, weight    OBJECTIVE    Observation:significant forward shoulder posture bilaterally  Kyphosis  [x] Inc    [] Dec  Lordosis   [x] Inc    [] Dec    AROM thoracic spine WNL all planes except rotation which is 25% castanon and painful    AROM alka shoulders   Flexion to 130 p! Abd to 135 p! ER and IR both normal, pain with end range    Tenderness to palpation:  Rhomboid major/minor, lats    Flexibility:dec flexibility lats, pec major/minor    Joint mobility:  Hypomobility noted thoracic spine t1-t12, pain with testing     OBJECTIVE  [x] Skin assessment post-treatment:  [x]intact []redness- no adverse reaction    []redness - adverse reaction:     15 min Self Care  [x] See flow sheet :   Rationale: increase ROM and increase strength to improve the patients ability to reach overhead, reach behind back            With   [x] Self care   [] manual   [] self care    Patient Education: [x] Review HEP    [] Progressed/Changed HEP based on:   [] positioning   [] body mechanics   [] transfers   [x] Ice application- pt advised to ice 10-15 min 1-2 x/day to area in order to dec inflammation    [x] other:  re: mechanism of injury/condition, role of physical therapy, prognosis for recovery, heat vs ice, activity modifications.  No sitting >30 min, set timer-get up and walk/do chores   Posture-shoulders back, shift center of gravity backward   Ice back 10-15 min daily  o [employer] torex oversized ice pack  Walking Shoes: fleet feet      Pain Level (0-10 scale) post treatment: 0    ASSESSMENT/Changes in Function:     [x]  See Plan of Margie.  Manas PT, RADHAT, CMTPT  PT License Number: [id number]   6/14/2022  9:53 AM

## 2022-06-14 NOTE — PROGRESS NOTES
[facility]  [address] [facility], [address]  Phone: [phone]  Fax: [phone]    Plan of Care/Statement of Necessity for Physical Therapy Services  2-15    Patient name: Megha Walker  : 1980  Provider#: [id number]  Referral source: Jean-Claude Cruz NP      Medical/Treatment Diagnosis: Thoracic back pain, unspecified back pain laterality, unspecified chronicity [M54.6]     Prior Hospitalization: see medical history     Comorbidities: see evaluation  Prior Level of Function:see evaluation  Medications: Verified on Patient Summary List  Start of Care: 2022     Onset Date:see evaluation     The Plan of Care and following information is based on the information from the initial evaluation.     Assessment/ key information: Patient presents with signs and symptoms consistent with thoracic pain post surgery secondary to prolonged immobility and poor posture and will benefit from physical therapy to address deficits noted below in problem list.   Evaluation Complexity History LOW Complexity : Zero comorbidities / personal factors that will impact the outcome / POC; Examination LOW Complexity : 1-2 Standardized tests and measures addressing body structure, function, activity limitation and / or participation in recreation  ;Presentation LOW Complexity : Stable, uncomplicated  ;Clinical Decision Making Other outcome measures clinical judgment  LOW   Overall Complexity Rating: LOW   Problem List: pain affecting function, decrease ROM, decrease strength, decrease ADL/ functional abilitiies, decrease activity tolerance, decrease flexibility/ joint mobility and decrease transfer abilities   Treatment Plan may include any combination of the following: Therapeutic exercise, Therapeutic activities, Neuromuscular re-education, Physical agent/modality, Manual therapy, Patient education and Self Care training  Patient / Family readiness to learn indicated by: asking questions, trying to perform skills and interest  Persons(s) to be included in education: patient (P)  Barriers to Learning/Limitations: None  Patient Goal (s): please see evaluation in [facility]  Patient Self Reported Health Status: please see paper chart  Rehabilitation Potential: good    Short Term Goals: To be accomplished in 5 treatments:  -Independent in HEP as evidenced on ability to perform at least 5 exercises from HEP using proper form without verbal cuing.   -Pain less than or equal to 5/10 at worst to allow patient to perform ADL's with greater ease  -Demostrate proper posture in order to decrease thoracic pain  -Pt will report she is sleeping without excessive amounts of pillows so as to not further exacerbate back pain  -Pt will report compliance with icing 1-2x/day in order to decrease inflammation    Long Term Goals: To be accomplished in 3 months:  -AROM alka shoulder flexion to 150 deg to allow pt to reach into cabinet  -Pt will be able to complete entire grocery shopping trip including unloading groceries without pain  -Pt will report she is able to walk greater than or equal to 5,000 steps daily to dec thoracic pain      Frequency / Duration: Patient to be seen 2 times per week for 3 months. Patient/ Caregiver education and instruction: self care, activity modification and exercises    [x]  Plan of care has been reviewed with PTA    Certification Period: 6/14/2022 -  9/14/22    Isaac Plata. Manas PT, DPT, CMTPT      7/05/1652 7:14 AM  PT License Number: [id number]  _____________________________________________________________________    I certify that the above Therapy Services are being furnished while the patient is under my care. I agree with the treatment plan and certify that this therapy is necessary.     [de-identified] Signature:____________________  Date:____________Time:_________

## 2022-06-21 ENCOUNTER — HOSPITAL ENCOUNTER (OUTPATIENT)
Dept: PHYSICAL THERAPY | Age: 42
Discharge: HOME | End: 2022-06-21
Payer: MEDICARE

## 2022-06-21 PROCEDURE — 97110 THERAPEUTIC EXERCISES: CPT

## 2022-06-21 NOTE — PROGRESS NOTES
PT DAILY TREATMENT NOTE - [facility] 2-15    Patient Name: Dorita Dunlap  Date:2022  : 1980  [x]  Patient  Verified  Payor: AARP MEDICARE COMPLETE / Plan: [facility] MEDICARE COMPLETE / Product Type: Managed Care Medicare /    In time: 11:05 AM  Out time: 11:55  Total Treatment Time (min): 50  Total Timed Codes (min): 40  1:1 Treatment Time ( only): 40   Visit #:  2    Treatment Area: Pain in thoracic spine [M54.6]    SUBJECTIVE  Pain Level (0-10 scale): achy  Any medication changes, allergies to medications, adverse drug reactions, diagnosis change, or new procedure performed?: [x] No    [] Yes (see summary sheet for update)  Subjective functional status/changes:   [] No changes reported  Pt stated she feels achy in her upper back. OBJECTIVE    Modality rationale: decrease inflammation and decrease pain to improve the patients ability to perform ADLs. Min Type Additional Details       [] Estim: []Att   []Unatt    []TENS instruct                  []IFC  []Premod   []NMES                     []Other:  []w/US   []w/ice   []w/heat  Position:  Location:       []  Traction: [] Cervical       []Lumbar                       [] Prone          []Supine                       []Intermittent   []Continuous Lbs:  [] before manual  [] after manual  []w/heat    []  Ultrasound: []Continuous   [] Pulsed                       at: []1MHz   []3MHz Location:  W/cm2:    [] Paraffin         Location:   []w/heat   10 [x]  Ice     []  Heat  []  Ice massage Position: supine  Location: upper back    []  Laser  []  Other: Position:  Location:      []  Vasopneumatic Device Pressure:       [] lo [] med [] hi   Temperature:      [x] Skin assessment post-treatment:  [x]intact []redness- no adverse reaction    []redness - adverse reaction:     40 min Therapeutic Exercise:  [x] See flow sheet : progressed per flowsheet   Rationale: increase ROM and increase strength to improve the patients ability to perform ADLs. With   [] TE   [] TA   [] Neuro   [] SC   [] other: Patient Education: [x] Review HEP    [] Progressed/Changed HEP based on:   [] positioning   [] body mechanics   [] transfers   [] heat/ice application    [] other:      Other Objective/Functional Measures:      Pain Level (0-10 scale) post treatment: \"same\"    ASSESSMENT/Changes in Function:   Pt able to tolerate progressed postural exercises and stretches with increased rest breaks and modifications due to discomfort in left anterior shoulder. Pt given updated HEP, demonstrating good understanding of exercises. Patient will continue to benefit from skilled PT services to modify and progress therapeutic interventions, address functional mobility deficits, address ROM deficits, address strength deficits, analyze and address soft tissue restrictions, analyze and cue movement patterns, analyze and modify body mechanics/ergonomics and assess and modify postural abnormalities to attain remaining goals.      []  See Plan of Care  []  See progress note/recertification  []  See Discharge Summary         Progress towards goals / Updated goals:      PLAN  []  Upgrade activities as tolerated     [x]  Continue plan of care  [x]  Update interventions per flow sheet       []  Discharge due to:_  []  Other:_      Diamante Benito, YOLI 6/21/2022

## 2022-06-27 ENCOUNTER — OFFICE VISIT (OUTPATIENT)
Dept: CARDIOLOGY CLINIC | Age: 42
End: 2022-06-27
Payer: MEDICARE

## 2022-06-27 DIAGNOSIS — Z95.810 AUTOMATIC IMPLANTABLE CARDIAC DEFIBRILLATOR IN SITU: Primary | ICD-10-CM

## 2022-06-27 PROCEDURE — 93296 REM INTERROG EVL PM/IDS: CPT | Performed by: INTERNAL MEDICINE

## 2022-06-27 PROCEDURE — 93295 DEV INTERROG REMOTE 1/2/MLT: CPT | Performed by: INTERNAL MEDICINE

## 2022-06-27 NOTE — LETTER
6/27/2022 9:40 AM    Ms. aMvis Trevino  [address] [place] [address],[facility] Floor [id number]            This letter confirms that we have received your scheduled remote check of your implanted     device on 6-27-22  . Our EP team will contact you via phone if there are significant abnormal    findings. Your next in-clinic device check is scheduled for 10-4-22 at 9:20am  .                   If you have any questions, please call [address] [place] [address] at [phone].                Sincerely,    Alexia Salgado MD [facility]

## 2022-06-28 ENCOUNTER — HOSPITAL ENCOUNTER (OUTPATIENT)
Dept: PHYSICAL THERAPY | Age: 42
Discharge: HOME | End: 2022-06-28
Payer: MEDICARE

## 2022-06-28 PROCEDURE — 97110 THERAPEUTIC EXERCISES: CPT | Performed by: PHYSICAL THERAPIST

## 2022-06-28 NOTE — PROGRESS NOTES
PT DAILY TREATMENT NOTE - [facility] 2-15    Patient Name: Shahriar Gonsalves  Date:2022  : 1980  [x]  Patient  Verified  Payor: AARP MEDICARE COMPLETE / Plan: [facility] AARP MEDICARE COMPLETE / Product Type: Managed Care Medicare /    In time:1035 A   Out time: 11:10  Total Treatment Time (min): 35  Total Timed Codes (min): 35  1:1 Treatment Time ( only): 35  Visit #:  3    Treatment Area: Pain in thoracic spine [M54.6]    SUBJECTIVE  Pain Level (0-10 scale): 7 (hips not thoracic)  Any medication changes, allergies to medications, adverse drug reactions, diagnosis change, or new procedure performed?: [x] No    [] Yes (see summary sheet for update)  Subjective functional status/changes:   [] No changes reported  Compliant with biking and exercises. OBJECTIVE    Modality rationale: decrease inflammation and decrease pain to improve the patients ability to perform ADLs.    Min Type Additional Details       [] Estim: []Att   []Unatt    []TENS instruct                  []IFC  []Premod   []NMES                     []Other:  []w/US   []w/ice   []w/heat  Position:  Location:       []  Traction: [] Cervical       []Lumbar                       [] Prone          []Supine                       []Intermittent   []Continuous Lbs:  [] before manual  [] after manual  []w/heat    []  Ultrasound: []Continuous   [] Pulsed                       at: []1MHz   []3MHz Location:  W/cm2:    [] Paraffin         Location:   []w/heat   declined [x]  Ice     []  Heat  []  Ice massage Position: supine  Location: upper back    []  Laser  []  Other: Position:  Location:      []  Vasopneumatic Device Pressure:       [] lo [] med [] hi   Temperature:      [x] Skin assessment post-treatment:  [x]intact []redness- no adverse reaction    []redness - adverse reaction:     35 min Therapeutic Exercise:  [x] See flow sheet : progressed per flowsheet   Rationale: increase ROM and increase strength to improve the patients ability to perform ADLs.            With   [] TE   [] TA   [] Neuro   [] SC   [] other: Patient Education: [x] Review HEP    [] Progressed/Changed HEP based on:   [] positioning   [] body mechanics   [] transfers   [] heat/ice application    [] other:      Other Objective/Functional Measures:      Pain Level (0-10 scale) post treatment: 3-4    ASSESSMENT/Changes in Function:                Progress towards goals / Updated goals:      PLAN  []  Upgrade activities as tolerated     [x]  Continue plan of care  [x]  Update interventions per flow sheet       []  Discharge due to:_  []  Other:_      Yoseph Torres.  Manas, PT,  6/28/2022

## 2022-07-05 ENCOUNTER — HOSPITAL ENCOUNTER (OUTPATIENT)
Dept: PHYSICAL THERAPY | Age: 42
Discharge: HOME | End: 2022-07-05
Payer: MEDICARE

## 2022-07-05 PROCEDURE — 97110 THERAPEUTIC EXERCISES: CPT

## 2022-07-05 NOTE — PROGRESS NOTES
PT DAILY TREATMENT NOTE - [facility] 2-15    Patient Name: Jacqueline December  Date:2022  : 1980  [x]  Patient  Verified  Payor: AARP MEDICARE COMPLETE / Plan: [facility] MEDICARE COMPLETE / Product Type: Managed Care Medicare /    In time:1100 A   Out time: 11:50 AM  Total Treatment Time (min): 50  Total Timed Codes (min): 40  1:1 Treatment Time ( only): 40  Visit #:  4    Treatment Area: Pain in thoracic spine [M54.6]    SUBJECTIVE  Pain Level (0-10 scale): 5, thoracic \"not too bad\"  Any medication changes, allergies to medications, adverse drug reactions, diagnosis change, or new procedure performed?: [x] No    [] Yes (see summary sheet for update)  Subjective functional status/changes:   [] No changes reported  Pt stated her pain is \"not too bad\" today. Hips are feeling better since last visit. Pt stated she should have iced after last visit to decrease soreness. OBJECTIVE    Modality rationale: decrease inflammation and decrease pain to improve the patients ability to perform ADLs.    Min Type Additional Details       [] Estim: []Att   []Unatt    []TENS instruct                  []IFC  []Premod   []NMES                     []Other:  []w/US   []w/ice   []w/heat  Position:  Location:       []  Traction: [] Cervical       []Lumbar                       [] Prone          []Supine                       []Intermittent   []Continuous Lbs:  [] before manual  [] after manual  []w/heat    []  Ultrasound: []Continuous   [] Pulsed                       at: []1MHz   []3MHz Location:  W/cm2:    [] Paraffin         Location:   []w/heat   10 [x]  Ice     []  Heat  []  Ice massage Position: supine  Location: upper back    []  Laser  []  Other: Position:  Location:      []  Vasopneumatic Device Pressure:       [] lo [] med [] hi   Temperature:      [x] Skin assessment post-treatment:  [x]intact []redness- no adverse reaction    []redness - adverse reaction:     40 min Therapeutic Exercise:  [x] See flow sheet : progressed per flowsheet   Rationale: increase ROM and increase strength to improve the patients ability to perform ADLs. With   [] TE   [] TA   [] Neuro   [] SC   [] other: Patient Education: [x] Review HEP    [] Progressed/Changed HEP based on:   [] positioning   [] body mechanics   [] transfers   [] heat/ice application    [] other:      Other Objective/Functional Measures:      Pain Level (0-10 scale) post treatment: \"good\"    ASSESSMENT/Changes in Function:   Improved exercise tolerance, tolerated progressions without increased symptoms. Progressing well with PT.          Progress towards goals / Updated goals:      PLAN  []  Upgrade activities as tolerated     [x]  Continue plan of care  [x]  Update interventions per flow sheet       []  Discharge due to:_  []  Other:_      Wendy Berger, YOLI 7/5/2022

## 2022-07-06 ENCOUNTER — OFFICE VISIT (OUTPATIENT)
Dept: FAMILY MEDICINE CLINIC | Age: 42
End: 2022-07-06
Payer: MEDICARE

## 2022-07-06 VITALS
HEART RATE: 84 BPM | DIASTOLIC BLOOD PRESSURE: 74 MMHG | TEMPERATURE: 97.4 F | HEIGHT: 68 IN | WEIGHT: 280 LBS | SYSTOLIC BLOOD PRESSURE: 112 MMHG | OXYGEN SATURATION: 98 % | BODY MASS INDEX: 42.44 KG/M2 | RESPIRATION RATE: 16 BRPM

## 2022-07-06 DIAGNOSIS — F41.1 GAD (GENERALIZED ANXIETY DISORDER): ICD-10-CM

## 2022-07-06 PROCEDURE — G8427 DOCREV CUR MEDS BY ELIG CLIN: HCPCS | Performed by: NURSE PRACTITIONER

## 2022-07-06 PROCEDURE — G8754 DIAS BP LESS 90: HCPCS | Performed by: NURSE PRACTITIONER

## 2022-07-06 PROCEDURE — 99213 OFFICE O/P EST LOW 20 MIN: CPT | Performed by: NURSE PRACTITIONER

## 2022-07-06 PROCEDURE — G8432 DEP SCR NOT DOC, RNG: HCPCS | Performed by: NURSE PRACTITIONER

## 2022-07-06 PROCEDURE — G8752 SYS BP LESS 140: HCPCS | Performed by: NURSE PRACTITIONER

## 2022-07-06 PROCEDURE — G8417 CALC BMI ABV UP PARAM F/U: HCPCS | Performed by: NURSE PRACTITIONER

## 2022-07-06 RX ORDER — DULOXETIN HYDROCHLORIDE 30 MG/1
60 CAPSULE, DELAYED RELEASE ORAL DAILY
Qty: 30 CAPSULE | Refills: 1
Start: 2022-07-06 | End: 2022-07-18 | Stop reason: SDUPTHER

## 2022-07-06 NOTE — PROGRESS NOTES
Chief Complaint   Patient presents with    Thyroid Problem    Allergies     swollen and itchy eyes     1. Have you been to the ER, urgent care clinic since your last visit? Hospitalized since your last visit? No    2. Have you seen or consulted any other health care providers outside of the [address] [place] [address] since your last visit? Include any pap smears or colon screening.  No

## 2022-07-06 NOTE — PROGRESS NOTES
Assessment/Plan:     Diagnoses and all orders for this visit:    1. DAISY (generalized anxiety disorder)  -     DULoxetine (CYMBALTA) 30 mg capsule; Take 2 Capsules by mouth daily. Indications: anxiousness associated with depression, chronic muscle or bone pain     Uncontrolled. Increase Cymbalta as above. Follow up in 4 weeks or sooner as needed. Establish with counseling as discussed. Follow-up and Dispositions    · Return in about 4 weeks (around 8/3/2022) for Follow Up. Discussed expected course/resolution/complications of diagnosis in detail with patient. Medication risks/benefits/costs/interactions/alternatives discussed with patient. Pt was given after visit summary which includes diagnoses, current medications & vitals. Pt expressed understanding with the diagnosis and plan          Subjective:      Amy Aparicio is a 38 y.o. female who presents for had concerns including Thyroid Problem and Allergies (swollen and itchy eyes). Reports an exacerbation of allergies. Not currently on treatment. This is her first evaluation. She initiated Cymbalta two months ago. Reports improvement in the mood. Even traveled out of the state, however continues with anxious thoughts. Some indifference since starting treatment as well. Patient Active Problem List   Diagnosis Code    History of Graves' disease Z86.39    Obesity, morbid ([facility] [address] E66.01    Chronic congestive heart failure ([facility] [address] I50.9    Tobacco abuse Z72.0    Chronic renal disease, stage III N18.30       Current Outpatient Medications   Medication Sig Dispense Refill    DULoxetine (CYMBALTA) 30 mg capsule Take 2 Capsules by mouth daily.  Indications: anxiousness associated with depression, chronic muscle or bone pain 30 Capsule 1    atorvastatin (LIPITOR) 20 mg tablet TAKE 1 TABLET BY MOUTH EVERY DAY 90 Tablet 1    pantoprazole (PROTONIX) 20 mg tablet TAKE 1 TABLET BY MOUTH ONCE DAILY FOR 90 DAYS 90 Tablet 1    levothyroxine (SYNTHROID) 50 mcg tablet TAKE 1 TABLET BY MOUTH EVERY DAY 90 Tablet 1    torsemide (DEMADEX) 20 mg tablet Take 2 Tablets by mouth daily. 180 Tablet 3    sacubitriL-valsartan (Entresto) 24-26 mg tablet Take 1 Tablet by mouth two (2) times a day. 180 Tablet 1    ergocalciferol (ERGOCALCIFEROL) 1,250 mcg (50,000 unit) capsule TAKE 1 CAPSULE BY MOUTH EVERY 30 DAYS. 12 Capsule 3    spironolactone (ALDACTONE) 50 mg tablet Take 1 Tablet by mouth two (2) times a day. 180 Tablet 1       Allergies   Allergen Reactions    Sulfamethoxazole-Trimethoprim Hives     Other reaction(s): Weal (disorder), swelling       ROS:   Review of Systems   Constitutional: Negative for malaise/fatigue. Eyes: Negative for blurred vision. Respiratory: Negative for shortness of breath. Cardiovascular: Negative for chest pain. Psychiatric/Behavioral: Positive for depression. Negative for suicidal ideas. The patient is nervous/anxious. Objective:     Visit Vitals  /74 (BP 1 Location: Right arm, BP Patient Position: Sitting, BP Cuff Size: Adult long)   Pulse 84   Temp 97.4 °F (36.3 °C) (Temporal)   Resp 16   Ht 5' 8\" (1.727 m)   Wt 280 lb (127 kg)   LMP 02/17/2011   SpO2 98%   BMI 42.57 kg/m²       Vitals and Nurse Documentation reviewed. Physical Exam  Constitutional:       General: She is not in acute distress. Cardiovascular:      Heart sounds: S1 normal and S2 normal. No murmur heard. No friction rub. No gallop. Pulmonary:      Effort: No respiratory distress. Breath sounds: Normal breath sounds. Skin:     General: Skin is warm and dry.    Psychiatric:         Mood and Affect: Mood and affect normal.

## 2022-07-07 ENCOUNTER — HOSPITAL ENCOUNTER (OUTPATIENT)
Dept: PHYSICAL THERAPY | Age: 42
Discharge: HOME | End: 2022-07-07
Payer: MEDICARE

## 2022-07-07 PROCEDURE — 97140 MANUAL THERAPY 1/> REGIONS: CPT | Performed by: PHYSICAL THERAPIST

## 2022-07-07 PROCEDURE — 97110 THERAPEUTIC EXERCISES: CPT | Performed by: PHYSICAL THERAPIST

## 2022-07-07 NOTE — PROGRESS NOTES
PT DAILY TREATMENT NOTE - [facility] 2-15    Patient Name: Jacqueline Tapia  BWNT:[id number]  : 1980  [x]  Patient  Verified  Payor: Keke Ernandez / Plan: [facility] AARP MEDICARE COMPLETE / Product Type: Managed Care Medicare /    In time:1200 P   Out time: 100P  Total Treatment Time (min): 60  Total Timed Codes (min): 50  1:1 Treatment Time ( only): 40  Visit #:  5    Treatment Area: Pain in thoracic spine [M54.6]    SUBJECTIVE  Pain Level (0-10 scale): 2  Any medication changes, allergies to medications, adverse drug reactions, diagnosis change, or new procedure performed?: [x] No    [] Yes (see summary sheet for update)  Subjective functional status/changes:   [] No changes reported  Thoracic pain not bad, her left shoulder has been hurting her. OBJECTIVE    Modality rationale: decrease inflammation and decrease pain to improve the patients ability to perform ADLs. Min Type Additional Details   10 [x]  Ice     []  Heat   Position: supine  Location: upper back     [x] Skin assessment post-treatment:  [x]intact []redness- no adverse reaction    []redness - adverse reaction:     35 min Therapeutic Exercise:  [x] See flow sheet : modified open books to seated today as pt unable to lie on left shouldere   Rationale: increase ROM and increase strength to improve the patients ability to perform ADLs. 15 min Manual Therapy: SL STM left infraspinatus, teres major, teres minor. SL scap mobs into downward rotation and retraction.     Rationale: decrease pain, increase ROM, increase tissue extensibility, decrease trigger points and improve joint mobility to improve the patients ability to reach overhead              With   [] TE   [] TA   [] Neuro   [] SC   [] other: Patient Education: [x] Review HEP    [] Progressed/Changed HEP based on:   [] positioning   [] body mechanics   [] transfers   [] heat/ice application    [] other:      Other Objective/Functional Measures:      Pain Level (0-10 scale) post treatment:0    ASSESSMENT/Changes in Function:     Progress towards goals / Updated goals:  Pt progressing very well. Improved postural awareness noted. PLAN  []  Upgrade activities as tolerated     [x]  Continue plan of care  [x]  Update interventions per flow sheet       []  Discharge due to:_  []  Other:_      Erica Davis.  Manas, PT, DPT 7/7/2022

## 2022-07-12 ENCOUNTER — HOSPITAL ENCOUNTER (OUTPATIENT)
Dept: PHYSICAL THERAPY | Age: 42
Discharge: HOME | End: 2022-07-12
Payer: MEDICARE

## 2022-07-12 PROCEDURE — 97140 MANUAL THERAPY 1/> REGIONS: CPT | Performed by: PHYSICAL THERAPIST

## 2022-07-12 PROCEDURE — 97110 THERAPEUTIC EXERCISES: CPT | Performed by: PHYSICAL THERAPIST

## 2022-07-12 NOTE — PROGRESS NOTES
PT DAILY TREATMENT NOTE - [facility] 2-15    Patient Name: Juliana Payne  Date:2022  : 1980  [x]  Patient  Verified  Payor: AARP MEDICARE COMPLETE / Plan: [facility] AARP MEDICARE COMPLETE / Product Type: Managed Care Medicare /    In time:  11:00 am   Out time: 12:00 pm  Total Treatment Time (min): 60  Total Timed Codes (min): 50  1:1 Treatment Time ( only): 50  Visit #:  6    Treatment Area: Pain in thoracic spine [M54.6]    SUBJECTIVE  Pain Level (0-10 scale): 0  Any medication changes, allergies to medications, adverse drug reactions, diagnosis change, or new procedure performed?: [x] No    [] Yes (see summary sheet for update)  Subjective functional status/changes:   [] No changes reported  Complains of superior and lateral shoulder pain. OBJECTIVE  Tenderness left rotator cuff insertion and long head of biceps tendon  Tenderness left infraspinatus and teres minor muscle bellies    Modality rationale: decrease inflammation and decrease pain to improve the patients ability to perform ADLs. Min Type Additional Details   10 [x]  Ice     []  Heat   Position: side lying  Location: Left shoulder     [x] Skin assessment post-treatment:  [x]intact []redness- no adverse reaction    []redness - adverse reaction:     35 min Therapeutic Exercise:  [x] See flow sheet :    Rationale: increase ROM and increase strength to improve the patients ability to perform ADLs. 15 min Manual Therapy:   SL TPR left infraspinatus, teres minor.      Left GH joint mobs  PROM left shoulder   Rationale: decrease pain, increase ROM, increase tissue extensibility, decrease trigger points and improve joint mobility to improve the patients ability to reach overhead         With   [] TE   [] TA   [] Neuro   [] SC   [] other: Patient Education: [x] Review HEP    [] Progressed/Changed HEP based on:   [] positioning   [] body mechanics   [] transfers   [] heat/ice application    [] other:      Other Objective/Functional Measures: Pain Level (0-10 scale) post treatment:0    ASSESSMENT/Changes in Function:  Rotator cuff insertion and biceps tendon irritation noted. Shoulder IR PROM tight and painful at end range.     Progress towards goals / Updated goals:      PLAN  []  Upgrade activities as tolerated     [x]  Continue plan of care  [x]  Update interventions per flow sheet       []  Discharge due to:_  []  Other:_      Audrey Jensen, PT  7/12/2022

## 2022-07-14 ENCOUNTER — HOSPITAL ENCOUNTER (OUTPATIENT)
Dept: PHYSICAL THERAPY | Age: 42
Discharge: HOME | End: 2022-07-14
Payer: MEDICARE

## 2022-07-14 PROCEDURE — 97140 MANUAL THERAPY 1/> REGIONS: CPT | Performed by: PHYSICAL THERAPIST

## 2022-07-14 PROCEDURE — 97110 THERAPEUTIC EXERCISES: CPT | Performed by: PHYSICAL THERAPIST

## 2022-07-14 NOTE — PROGRESS NOTES
PT DAILY TREATMENT NOTE - [facility] 2-15    Patient Name: Michael Solano  Date:2022  : 1980  [x]  Patient  Verified  Payor: AARP MEDICARE COMPLETE / [facility] / Product Type: Managed Care Medicare /    In time:  247 A   Out time: 12:15 P   Total Treatment Time (min): 45  Total Timed Codes (min): 35  1:1 Treatment Time ( only): 25  Visit #:  7    Treatment Area: Pain in thoracic spine [M54.6]    SUBJECTIVE  Pain Level (0-10 scale): 6-7  Any medication changes, allergies to medications, adverse drug reactions, diagnosis change, or new procedure performed?: [x] No    [] Yes (see summary sheet for update)  Subjective functional status/changes:   [] No changes reported  Increased pain as pt lifted her groceries    OBJECTIVE      Modality rationale: decrease inflammation and decrease pain to improve the patients ability to perform ADLs. Min Type Additional Details   10 []  Ice     [x]  Heat   Position: seated, marquis shoulders supported  Location:marquis shoulders     [x] Skin assessment post-treatment:  [x]intact []redness- no adverse reaction    []redness - adverse reaction:     10 min Therapeutic Exercise:  [x] See flow sheet : held on most of ex's secondary to high pain levels today. Rationale: increase ROM and increase strength to improve the patients ability to perform ADLs. 25 min Manual Therapy:   Marquis shoulder PROM all planes, distraction   Rationale: decrease pain, increase ROM, increase tissue extensibility, decrease trigger points and improve joint mobility to improve the patients ability to reach overhead         With   [] TE   [] TA   [] Neuro   [] SC   [] other: Patient Education: [x] Review HEP    [] Progressed/Changed HEP based on:   [] positioning   [] body mechanics   [] transfers   [] heat/ice application    [] other:      Other Objective/Functional Measures:    PROM marquis shoulders castanon all planes with pain at all end ranges.      Pain Level (0-10 scale) post treatment:4    ASSESSMENT/Changes in Function:      Progress towards goals / Updated goals:      Pt with dec pain post manual today. PLAN  []  Upgrade activities as tolerated     [x]  Continue plan of care  [x]  Update interventions per flow sheet       []  Discharge due to:_  [x]  Other:_   Re-assessment next time    Michelle Malagon, PT  7/14/2022

## 2022-07-17 ENCOUNTER — PATIENT MESSAGE (OUTPATIENT)
Dept: FAMILY MEDICINE CLINIC | Age: 42
End: 2022-07-17

## 2022-07-17 DIAGNOSIS — F41.1 GAD (GENERALIZED ANXIETY DISORDER): ICD-10-CM

## 2022-07-18 RX ORDER — DULOXETIN HYDROCHLORIDE 60 MG/1
60 CAPSULE, DELAYED RELEASE ORAL DAILY
Qty: 30 CAPSULE | Refills: 3 | Status: SHIPPED | OUTPATIENT
Start: 2022-07-18 | End: 2022-10-06 | Stop reason: ALTCHOICE

## 2022-07-18 NOTE — TELEPHONE ENCOUNTER
From: Michelle Lovelace  To:  Fabricio Bearden NP  Sent: 7/17/2022 4:14 PM EDT  Subject: Refill request     Gaurav Spann,     I forgot to call or message about the effects of increasing the duloxetine seems to be helping a little but I didn't have as many as I thought so if you could please send a refill with the new dosage to my pharmacy that would be great    Thanks in advance   [facility]

## 2022-07-19 ENCOUNTER — HOSPITAL ENCOUNTER (OUTPATIENT)
Dept: PHYSICAL THERAPY | Age: 42
Discharge: HOME | End: 2022-07-19
Payer: MEDICARE

## 2022-07-19 PROCEDURE — 97110 THERAPEUTIC EXERCISES: CPT | Performed by: PHYSICAL THERAPIST

## 2022-07-19 NOTE — PROGRESS NOTES
PT DAILY TREATMENT NOTE/PROGRESS NOTE    Patient Name: Trevor Lindsey  Date:2022  : 1980  [x]  Patient  Verified  Payor: Callie Pranay / Plan: [facility] AARP MEDICARE COMPLETE / Product Type: Managed Care Medicare /    In time:  1100 A  Out time:  1150 A  Total Treatment Time (min): 50  Total Timed Codes (min): 40  1:1 Treatment Time ([facility] only): 25  Visit #:  8    Treatment Area: Pain in thoracic spine [M54.6]    SUBJECTIVE  Pain Level (0-10 scale): 4  Any medication changes, allergies to medications, adverse drug reactions, diagnosis change, or new procedure performed?: [x] No    [] Yes (see summary sheet for update)  Subjective functional status/changes:   [] No changes reported  Able to do a grocery trip this past weekend, and unload a couple bags at a time then cook dinner no pain after. OBJECTIVE      Modality rationale: decrease inflammation and decrease pain to improve the patients ability to perform ADLs. Min Type Additional Details   10 [x]  Ice     []  Heat   Position: seated, alka shoulders supported  Location:alka shoulders     [x] Skin assessment post-treatment:  [x]intact []redness- no adverse reaction    []redness - adverse reaction:     40 min Therapeutic Exercise:  [x] See flow sheet :    Rationale: increase ROM and increase strength to improve the patients ability to perform ADLs.     - min Manual Therapy:   Alka shoulder PROM all planes, distraction   Rationale: decrease pain, increase ROM, increase tissue extensibility, decrease trigger points and improve joint mobility to improve the patients ability to reach overhead         With   [] TE   [] TA   [] Neuro   [] SC   [] other: Patient Education: [x] Review HEP    [] Progressed/Changed HEP based on:   [] positioning   [] body mechanics   [] transfers   [] heat/ice application    [] other:      Other Objective/Functional Measures:    Observation:significant forward shoulder posture bilaterally  Kyphosis [x]? Inc    []? Dec  Lordosis                      [x]? Inc    []? Dec     AROM thoracic spine WNL all planes except rotation which is 25% castanon and painful     AROM alka shoulders   Flexion to 160   Abd to to 160  ER WNL  IR just stretch at end range. Tenderness to palpation:  none     Flexibility:dec flexibility lats, pec major/minor     Joint mobility:  Hypomobility noted thoracic spine t1-t12  Pain Level (0-10 scale) post treatment:4    ASSESSMENT/Changes in Function:      Progress towards goals / Updated goals:      Short Term Goals: To be accomplished in 5 treatments:  -Independent in HEP as evidenced on ability to perform at least 5 exercises from HEP using proper form without verbal cuing. -MET  -Pain less than or equal to 5/10 at worst to allow patient to perform ADL's with greater ease-MET  -Demostrate proper posture in order to decrease thoracic pain-MET  -Pt will report she is sleeping without excessive amounts of pillows so as to not further exacerbate back pain-MET  -Pt will report compliance with icing 1-2x/day in order to decrease inflammation-MET     Long Term Goals: To be accomplished in 3 months:  -AROM alka shoulder flexion to 150 deg to allow pt to reach into cabinet-MET  -Pt will be able to complete entire grocery shopping trip including unloading groceries without pain-MET  -Pt will report she is able to walk greater than or equal to 5,000 steps daily to dec thoracic pain-PROGRESSING, doing about 3K 4K per day. Pt with improved ROM, strength, and overall exercise tolerance. Has met all of STG and most of LTG. Will benefit from further therapy to reach all LTG. PLAN  []  Upgrade activities as tolerated     [x]  Continue plan of care  [x]  Update interventions per flow sheet       []  Discharge due to:_  [x]  Other:_   Decrease frequency to 1x/week as pt resumes shopping/cooking and works toward LTG of 5K steps daily. Maggie Shannon.  Manas, PT  7/19/2022

## 2022-07-26 ENCOUNTER — HOSPITAL ENCOUNTER (OUTPATIENT)
Dept: PHYSICAL THERAPY | Age: 42
Discharge: HOME | End: 2022-07-26
Payer: MEDICARE

## 2022-07-26 PROCEDURE — 97110 THERAPEUTIC EXERCISES: CPT | Performed by: PHYSICAL THERAPIST

## 2022-07-26 NOTE — PROGRESS NOTES
PT DAILY TREATMENT NOTE - [facility] 2-15    Patient Name: Sean Decent  Date:2022  : 1980  [x]  Patient  Verified  Payor: AARP MEDICARE COMPLETE / Plan: [facility] AARP MEDICARE COMPLETE / Product Type: Managed Care Medicare /    In time:  1100 A   Out time: 1155 A  Total Treatment Time (min): 55  Total Timed Codes (min): 45  1:1 Treatment Time ( only): 39  Visit #:  9    Treatment Area: Pain in thoracic spine [M54.6]    SUBJECTIVE  Pain Level (0-10 scale):0  Any medication changes, allergies to medications, adverse drug reactions, diagnosis change, or new procedure performed?: [x] No    [] Yes (see summary sheet for update)  Subjective functional status/changes:   [] No changes reported  NO pain, pt has not tried doing any cooking recently. OBJECTIVE      Modality rationale: decrease inflammation and decrease pain to improve the patients ability to perform ADLs. Min Type Additional Details   10 []  Ice     [x]  Heat   Position: seated, marquis shoulders supported  Location:marquis shoulders     [x] Skin assessment post-treatment:  [x]intact []redness- no adverse reaction    []redness - adverse reaction:     45 min Therapeutic Exercise:  [x] See flow sheet :   Rationale: increase ROM and increase strength to improve the patients ability to perform ADLs.     0 min Manual Therapy:   Marquis shoulder PROM all planes, distraction   Rationale: decrease pain, increase ROM, increase tissue extensibility, decrease trigger points and improve joint mobility to improve the patients ability to reach overhead         With   [] TE   [] TA   [] Neuro   [] SC   [] other: Patient Education: [x] Review HEP    [] Progressed/Changed HEP based on:   [] positioning   [] body mechanics   [] transfers   [] heat/ice application    [] other:      Other Objective/Functional Measures:        Pain Level (0-10 scale) post treatment:0    ASSESSMENT/Changes in Function:      Progress towards goals / Updated goals:    Pt сергей addition of exercises and heavier weights without increased pain today. PLAN  []  Upgrade activities as tolerated     [x]  Continue plan of care  [x]  Update interventions per flow sheet       []  Discharge due to:_  []  Other    Otis Andrey.  Manas, PT  7/26/2022

## 2022-08-02 ENCOUNTER — HOSPITAL ENCOUNTER (OUTPATIENT)
Dept: PHYSICAL THERAPY | Age: 42
Discharge: HOME | End: 2022-08-02
Payer: MEDICARE

## 2022-08-02 PROCEDURE — 97110 THERAPEUTIC EXERCISES: CPT | Performed by: PHYSICAL THERAPIST

## 2022-08-02 NOTE — PROGRESS NOTES
PT DAILY TREATMENT NOTE - [facility] 2-15    Patient Name: Yeimy Toledo  Date:2022  : 1980  [x]  Patient  Verified  Payor: AARP MEDICARE COMPLETE / Plan: [facility] MEDICARE COMPLETE / Product Type: Managed Care Medicare /    In time:  1100 A   Out time: 1200 P  Total Treatment Time (min): 60  Total Timed Codes (min): 50  1:1 Treatment Time ( only):40  Visit #:  10    Treatment Area: Pain in thoracic spine [M54.6]    SUBJECTIVE  Pain Level (0-10 scale):0  Any medication changes, allergies to medications, adverse drug reactions, diagnosis change, or new procedure performed?: [x] No    [] Yes (see summary sheet for update)  Subjective functional status/changes:   [] No changes reported  Feeling fine. OBJECTIVE      Modality rationale: decrease inflammation and decrease pain to improve the patients ability to perform ADLs. Min Type Additional Details   10 []  Ice     [x]  Heat   Position: seated, marquis shoulders supported  Location:marquis shoulders     [x] Skin assessment post-treatment:  [x]intact []redness- no adverse reaction    []redness - adverse reaction:     50 min Therapeutic Exercise:  [x] See flow sheet :   Rationale: increase ROM and increase strength to improve the patients ability to perform ADLs.     0 min Manual Therapy:   Marquis shoulder PROM all planes, distraction   Rationale: decrease pain, increase ROM, increase tissue extensibility, decrease trigger points and improve joint mobility to improve the patients ability to reach overhead         With   [] TE   [] TA   [] Neuro   [] SC   [] other: Patient Education: [x] Review HEP    [] Progressed/Changed HEP based on:   [] positioning   [] body mechanics   [] transfers   [] heat/ice application    [] other:      Other Objective/Functional Measures:        Pain Level (0-10 scale) post treatment:0    ASSESSMENT/Changes in Function:      Progress towards goals / Updated goals:        PLAN  []  Upgrade activities as tolerated     [x]  Continue plan of care  [x]  Update interventions per flow sheet       []  Discharge due to:_  []  Other    Jammie Black.  Manas, PT  8/2/2022

## 2022-08-09 ENCOUNTER — HOSPITAL ENCOUNTER (OUTPATIENT)
Dept: PHYSICAL THERAPY | Age: 42
Discharge: HOME | End: 2022-08-09
Payer: MEDICARE

## 2022-08-09 PROCEDURE — 97110 THERAPEUTIC EXERCISES: CPT | Performed by: PHYSICAL THERAPIST

## 2022-08-09 NOTE — PROGRESS NOTES
PT DAILY TREATMENT NOTE - [facility] 2-15    Patient Name: Yeimy Toledo  Date:2022  : 1980  [x]  Patient  Verified  Payor: [facility] MEDICARE COMPLETE / Plan: [facility] MEDICARE COMPLETE / Product Type: Managed Care Medicare /    In time:  1100 A   Out time: 1150 P  Total Treatment Time (min): 50  Total Timed Codes (min): 40  1:1 Treatment Time ( only):40  Visit #:  11    Treatment Area: Pain in thoracic spine [M54.6]    SUBJECTIVE  Pain Level (0-10 scale):0  Any medication changes, allergies to medications, adverse drug reactions, diagnosis change, or new procedure performed?: [x] No    [] Yes (see summary sheet for update)  Subjective functional status/changes:   [] No changes reported  Only had a little pain because she lifted more than she should have. Overall, doing well. OBJECTIVE      Modality rationale: decrease inflammation and decrease pain to improve the patients ability to perform ADLs. Min Type Additional Details   10 [x]  Ice     []  Heat   Position: seated, alka shoulders supported  Location:alka shoulders     [x] Skin assessment post-treatment:  [x]intact []redness- no adverse reaction    []redness - adverse reaction:     50 min Therapeutic Exercise:  [x] See flow sheet :   Rationale: increase ROM and increase strength to improve the patients ability to perform ADLs. With   [] TE   [] TA   [] Neuro   [] SC   [] other: Patient Education: [x] Review HEP    [] Progressed/Changed HEP based on:   [] positioning   [] body mechanics   [] transfers   [] heat/ice application    [] other:      Other Objective/Functional Measures:        Pain Level (0-10 scale) post treatment:0    ASSESSMENT/Changes in Function:      Progress towards goals / Updated goals:      Some pain noted today during standing shoulder flexion with weight. Dec pain by dec ROM and by dec weight. Progressing well toward all goals.      PLAN  []  Upgrade activities as tolerated     [x]  Continue plan of care  [x] Update interventions per flow sheet       []  Discharge due to:_  []  Other    Yue Reeves.  Manas, PT  8/9/2022

## 2022-08-16 ENCOUNTER — HOSPITAL ENCOUNTER (OUTPATIENT)
Dept: PHYSICAL THERAPY | Age: 42
Discharge: HOME | End: 2022-08-16
Payer: MEDICARE

## 2022-08-16 DIAGNOSIS — I50.9 CHRONIC CONGESTIVE HEART FAILURE, UNSPECIFIED HEART FAILURE TYPE (HCC): ICD-10-CM

## 2022-08-16 PROCEDURE — 97110 THERAPEUTIC EXERCISES: CPT | Performed by: PHYSICAL THERAPIST

## 2022-08-16 RX ORDER — SPIRONOLACTONE 50 MG/1
TABLET, FILM COATED ORAL
Qty: 180 TABLET | Refills: 1 | Status: SHIPPED | OUTPATIENT
Start: 2022-08-16

## 2022-08-16 NOTE — TELEPHONE ENCOUNTER
Refilled per VO per MD    Future Appointments   Date Time Provider Darien Santosi   8/16/2022 11:00 AM Stella Ching., PT [facility] - [facility] RE   8/23/2022 11:00 AM Andrew Malagon., PT [facility] - [facility] RE   8/30/2022 11:00 AM Andrew Malagon., PT [facility] - [facility] RE   9/8/2022 10:40 AM Dulce Claude, MD [facility] BS AMB   10/4/2022  9:20 AM [email or web address], CECIL PANTOJA BS AMB   10/6/2022 10:15 AM Elmer Mckeon, NP [facility] BS AMB   1/4/2023  7:30 AM REMOTE1, CECIL PANTOJA BS AMB

## 2022-08-16 NOTE — PROGRESS NOTES
PT DAILY TREATMENT NOTE - [facility] 2-15    Patient Name: Francisco Charles  Date:2022  : 1980  [x]  Patient  Verified  Payor: [facility] MEDICARE COMPLETE / [facility] / Product Type: Managed Care Medicare /    In time:  1100 A   Out time: 1140 A  Total Treatment Time (min): 40  Total Timed Codes (min): 30  1:1 Treatment Time [facility] only):30  Visit #:  12    Treatment Area: Pain in thoracic spine [M54.6]    SUBJECTIVE  Pain Level (0-10 scale):8  Any medication changes, allergies to medications, adverse drug reactions, diagnosis change, or new procedure performed?: [x] No    [] Yes (see summary sheet for update)  Subjective functional status/changes:   [] No changes reported  Increased pain today, mainly because she overdid it celebrating this weekend for her anniversary. OBJECTIVE      Modality rationale: decrease inflammation and decrease pain to improve the patients ability to perform ADLs. Min Type Additional Details   10 [x]  Ice     []  Heat   Position: seated, alka shoulders supported  Location:alka shoulders  Extra towel       [x] Skin assessment post-treatment:  [x]intact []redness- no adverse reaction    []redness - adverse reaction:     30 min Therapeutic Exercise:  [x] See flow sheet :program shortened per pt request due to pain/fatigue today. Rationale: increase ROM and increase strength to improve the patients ability to perform ADLs.            With   [] TE   [] TA   [] Neuro   [] SC   [] other: Patient Education: [x] Review HEP    [] Progressed/Changed HEP based on:   [] positioning   [] body mechanics   [] transfers   [] heat/ice application    [] other:      Other Objective/Functional Measures:        Pain Level (0-10 scale) post treatment:0    ASSESSMENT/Changes in Function:      Progress towards goals / Updated goals:          PLAN  []  Upgrade activities as tolerated     [x]  Continue plan of care  [x]  Update interventions per flow sheet       []  Discharge due to:_  []  Other    Zuhair Algerian.  Manas, PT  8/16/2022

## 2022-08-23 ENCOUNTER — APPOINTMENT (OUTPATIENT)
Dept: PHYSICAL THERAPY | Age: 42
End: 2022-08-23
Payer: MEDICARE

## 2022-08-30 ENCOUNTER — APPOINTMENT (OUTPATIENT)
Dept: PHYSICAL THERAPY | Age: 42
End: 2022-08-30
Payer: MEDICARE

## 2022-09-08 ENCOUNTER — OFFICE VISIT (OUTPATIENT)
Dept: CARDIOLOGY CLINIC | Age: 42
End: 2022-09-08
Payer: MEDICARE

## 2022-09-08 VITALS
DIASTOLIC BLOOD PRESSURE: 78 MMHG | RESPIRATION RATE: 18 BRPM | BODY MASS INDEX: 43.5 KG/M2 | HEIGHT: 68 IN | HEART RATE: 90 BPM | WEIGHT: 287 LBS | SYSTOLIC BLOOD PRESSURE: 110 MMHG | OXYGEN SATURATION: 97 %

## 2022-09-08 DIAGNOSIS — R60.0 EDEMA, LOWER EXTREMITY: ICD-10-CM

## 2022-09-08 DIAGNOSIS — I42.8 NON-ISCHEMIC CARDIOMYOPATHY (HCC): Primary | ICD-10-CM

## 2022-09-08 DIAGNOSIS — Z72.0 TOBACCO USE: ICD-10-CM

## 2022-09-08 DIAGNOSIS — E78.2 MIXED HYPERLIPIDEMIA: ICD-10-CM

## 2022-09-08 PROCEDURE — G8510 SCR DEP NEG, NO PLAN REQD: HCPCS | Performed by: INTERNAL MEDICINE

## 2022-09-08 PROCEDURE — G8427 DOCREV CUR MEDS BY ELIG CLIN: HCPCS | Performed by: INTERNAL MEDICINE

## 2022-09-08 PROCEDURE — G8754 DIAS BP LESS 90: HCPCS | Performed by: INTERNAL MEDICINE

## 2022-09-08 PROCEDURE — G8417 CALC BMI ABV UP PARAM F/U: HCPCS | Performed by: INTERNAL MEDICINE

## 2022-09-08 PROCEDURE — 99214 OFFICE O/P EST MOD 30 MIN: CPT | Performed by: INTERNAL MEDICINE

## 2022-09-08 PROCEDURE — G8752 SYS BP LESS 140: HCPCS | Performed by: INTERNAL MEDICINE

## 2022-09-08 PROCEDURE — 93000 ELECTROCARDIOGRAM COMPLETE: CPT | Performed by: INTERNAL MEDICINE

## 2022-09-08 RX ORDER — CETIRIZINE HYDROCHLORIDE 10 MG/1
CAPSULE, LIQUID FILLED ORAL
COMMUNITY

## 2022-09-08 RX ORDER — SENNOSIDES 8.6 MG/1
CAPSULE, GELATIN COATED ORAL AS NEEDED
COMMUNITY

## 2022-09-08 NOTE — PROGRESS NOTES
[place]: Linda Kinney  [phone]    History of Present Illness:  Ms. Paco Banuelos is a 39 yo F with h/o non ischemic cardiomyopathy (cath 2018 and 2019 with no significant CAD; mild LAD/RCA plaquing) EF of 35-40% by echo 2021 (as low as 13% in past per pt), severe MR s/p MV repair 2018, status post [employer] ICD in 01/2021, HTN, mixed hyperlipidemia, h/o hyperthyroid, Grave's disease (treated with radiation isotope therapy in 2019). Was under consideration for LVAD/transplant at one point and followed by CHF clinic at [facility]. Cardiac MRI in 01/2018 with ejection fraction of 40% and findings of dilated nonischemic cardiomyopathy. Right heart catheterization in 2018, 2019 and 2020. In 08/2018 was status post mitral valve repair with just mild to moderate mitral regurgitation thereafter. Her echocardiograms in 2018 and 2019 through 2021 overall demonstrated ejection fraction between 35-40%. Grave's disease was treated with radiation isotope therapy in 2019. Since her last visit, overall she feels about the same. She has baseline shortness of breath and this is unchanged. No exertional chest pain, but on occasion she will get some chest tightness that is brief. Just rare palpitations. Her daughter did give her a watch that encourages her to be more active and encouraged regular exercise and weight loss. On exam, she is compensated with clear lungs. She does have chronic 1+ bilateral lower extremity edema. Her most recent echocardiogram demonstrated an EF of 40-45%. Assessment and Plan:   1. Nonischemic cardiomyopathy. Stable and compensated. Will have her follow up with a same day echocardiogram in six months. 2. Lower extremity edema. Most consistent with venous insufficiency and this is out of proportion to her slight decrease in LV function. Will give her contact information for a vascular specialist.    3. Tobacco use. Encouraged cessation. 4. ICD. Non-sustained ventricular tachycardia. Stable on beta blocker. 5. Mixed hyperlipidemia. Tolerating statin. She  has a past medical history of Congestive heart disease ([address] Endocrine disease, Other ill-defined conditions(799.89), Other ill-defined conditions(799.89), and Thyroid disease. All other systems negative except as above. PE  Vitals:    09/08/22 1105   BP: 110/78   Pulse: 90   Resp: 18   SpO2: 97%   Weight: 287 lb (130.2 kg)   Height: 5' 8\" (1.727 m)      Body mass index is 43.64 kg/m².    General appearance - alert, well appearing, and in no distress  Mental status - affect appropriate to mood  Eyes - sclera anicteric, moist mucous membranes  Neck - supple, no JVD  Chest - clear to auscultation, no wheezes, rales or rhonchi  Heart - normal rate, regular rhythm, normal S1, S2, I/VI systolic murmur LUSB  Abdomen - soft, nontender, nondistended, no masses or organomegaly  Neurological -no focal deficit  Extremities - peripheral pulses normal, no pedal edema      Recent Labs:  Lab Results   Component Value Date/Time    Cholesterol, total 131 03/09/2022 10:45 AM    HDL Cholesterol 41 03/09/2022 10:45 AM    LDL, calculated 74.2 03/09/2022 10:45 AM    Triglyceride 79 03/09/2022 10:45 AM    CHOL/HDL Ratio 3.2 03/09/2022 10:45 AM     Lab Results   Component Value Date/Time    Creatinine (POC) 0.4 (L) 06/06/2010 12:05 PM    Creatinine 1.15 (H) 03/09/2022 10:45 AM     Lab Results   Component Value Date/Time    BUN 11 03/09/2022 10:45 AM    BUN (POC) 9 06/06/2010 12:05 PM     Lab Results   Component Value Date/Time    Potassium 4.5 03/09/2022 10:45 AM     Lab Results   Component Value Date/Time    Hemoglobin A1c 5.9 (H) 08/09/2021 12:19 PM     Lab Results   Component Value Date/Time    Hemoglobin (POC) 11.2 (L) 06/06/2010 12:05 PM    HGB 12.4 03/09/2022 10:45 AM     Lab Results   Component Value Date/Time    PLATELET 394 53/52/1371 10:45 AM       Reviewed:  Past Medical History:   Diagnosis Date    Congestive heart disease ([facility] Endocrine disease     Grave's Disease    Other ill-defined conditions(799.89)     migraines    Other ill-defined conditions(799.89)     graves    Thyroid disease      Social History     Tobacco Use   Smoking Status Every Day    Packs/day: 0.25    Types: Cigarettes   Smokeless Tobacco Never   Tobacco Comments    5 cig a day     Social History     Substance and Sexual Activity   Alcohol Use Yes    Comment: rarely     Allergies   Allergen Reactions    Sulfamethoxazole-Trimethoprim Hives     Other reaction(s): Weal (disorder), swelling       Current Outpatient Medications   Medication Sig    sennosides (Senna) 8.6 mg cap Take  by mouth as needed for Constipation. Cetirizine (ZyrTEC) 10 mg cap Take  by mouth. spironolactone (ALDACTONE) 50 mg tablet TAKE 1 TABLET BY MOUTH TWO TIMES A DAY. DULoxetine (CYMBALTA) 60 mg capsule Take 1 Capsule by mouth daily. Indications: anxiousness associated with depression, chronic muscle or bone pain    atorvastatin (LIPITOR) 20 mg tablet TAKE 1 TABLET BY MOUTH EVERY DAY    pantoprazole (PROTONIX) 20 mg tablet TAKE 1 TABLET BY MOUTH ONCE DAILY FOR 90 DAYS    levothyroxine (SYNTHROID) 50 mcg tablet TAKE 1 TABLET BY MOUTH EVERY DAY    torsemide (DEMADEX) 20 mg tablet Take 2 Tablets by mouth daily. sacubitriL-valsartan (Entresto) 24-26 mg tablet Take 1 Tablet by mouth two (2) times a day. ergocalciferol (ERGOCALCIFEROL) 1,250 mcg (50,000 unit) capsule TAKE 1 CAPSULE BY MOUTH EVERY 30 DAYS. No current facility-administered medications for this visit.        Yair Daniels MD  [facility] heart and Vascular [place]  [address] [facility] [address]  [address]

## 2022-09-08 NOTE — PATIENT INSTRUCTIONS
Brannon Pallas MD    [address] [id number], [place], [address] [facility]  Phone: [phone]

## 2022-10-04 ENCOUNTER — CLINICAL SUPPORT (OUTPATIENT)
Dept: CARDIOLOGY CLINIC | Age: 42
End: 2022-10-04
Payer: MEDICARE

## 2022-10-04 DIAGNOSIS — Z95.810 AUTOMATIC IMPLANTABLE CARDIAC DEFIBRILLATOR IN SITU: Primary | ICD-10-CM

## 2022-10-04 PROCEDURE — 93289 INTERROG DEVICE EVAL HEART: CPT | Performed by: INTERNAL MEDICINE

## 2022-10-04 NOTE — PROGRESS NOTES
Chargeable annual VVI ICD clinic (MDT). Device functioning appropriately as programmed. See scanned documents in media manger.

## 2022-10-05 DIAGNOSIS — F41.1 GAD (GENERALIZED ANXIETY DISORDER): ICD-10-CM

## 2022-10-05 RX ORDER — DULOXETIN HYDROCHLORIDE 30 MG/1
CAPSULE, DELAYED RELEASE ORAL
Qty: 30 CAPSULE | Refills: 1 | OUTPATIENT
Start: 2022-10-05

## 2022-10-06 ENCOUNTER — OFFICE VISIT (OUTPATIENT)
Dept: FAMILY MEDICINE CLINIC | Age: 42
End: 2022-10-06
Payer: MEDICARE

## 2022-10-06 VITALS
TEMPERATURE: 97.4 F | SYSTOLIC BLOOD PRESSURE: 92 MMHG | OXYGEN SATURATION: 98 % | HEIGHT: 68 IN | WEIGHT: 284.6 LBS | RESPIRATION RATE: 16 BRPM | DIASTOLIC BLOOD PRESSURE: 65 MMHG | BODY MASS INDEX: 43.13 KG/M2 | HEART RATE: 81 BPM

## 2022-10-06 DIAGNOSIS — Z79.2 PROPHYLACTIC ANTIBIOTIC: ICD-10-CM

## 2022-10-06 DIAGNOSIS — F41.1 GAD (GENERALIZED ANXIETY DISORDER): ICD-10-CM

## 2022-10-06 DIAGNOSIS — R73.03 PREDIABETES: ICD-10-CM

## 2022-10-06 DIAGNOSIS — Z00.00 MEDICARE ANNUAL WELLNESS VISIT, SUBSEQUENT: Primary | ICD-10-CM

## 2022-10-06 LAB — HBA1C MFR BLD HPLC: 6.4 %

## 2022-10-06 PROCEDURE — G8432 DEP SCR NOT DOC, RNG: HCPCS | Performed by: NURSE PRACTITIONER

## 2022-10-06 PROCEDURE — 83036 HEMOGLOBIN GLYCOSYLATED A1C: CPT | Performed by: NURSE PRACTITIONER

## 2022-10-06 PROCEDURE — G8754 DIAS BP LESS 90: HCPCS | Performed by: NURSE PRACTITIONER

## 2022-10-06 PROCEDURE — G0439 PPPS, SUBSEQ VISIT: HCPCS | Performed by: NURSE PRACTITIONER

## 2022-10-06 PROCEDURE — G8427 DOCREV CUR MEDS BY ELIG CLIN: HCPCS | Performed by: NURSE PRACTITIONER

## 2022-10-06 PROCEDURE — G8752 SYS BP LESS 140: HCPCS | Performed by: NURSE PRACTITIONER

## 2022-10-06 PROCEDURE — G8417 CALC BMI ABV UP PARAM F/U: HCPCS | Performed by: NURSE PRACTITIONER

## 2022-10-06 RX ORDER — FLUCONAZOLE 150 MG/1
150 TABLET ORAL DAILY
Qty: 1 TABLET | Refills: 0 | Status: SHIPPED | OUTPATIENT
Start: 2022-10-06 | End: 2022-10-07

## 2022-10-06 RX ORDER — FLUCONAZOLE 150 MG/1
150 TABLET ORAL DAILY
Qty: 1 TABLET | Refills: 3 | Status: SHIPPED | OUTPATIENT
Start: 2022-10-06 | End: 2022-10-07

## 2022-10-06 RX ORDER — CITALOPRAM 10 MG/1
10 TABLET ORAL DAILY
Qty: 30 TABLET | Refills: 3 | Status: SHIPPED | OUTPATIENT
Start: 2022-10-06

## 2022-10-06 RX ORDER — METFORMIN HYDROCHLORIDE 500 MG/1
TABLET, EXTENDED RELEASE ORAL
Qty: 60 TABLET | Refills: 1 | Status: SHIPPED | OUTPATIENT
Start: 2022-10-06 | End: 2022-10-30

## 2022-10-06 RX ORDER — AMOXICILLIN 500 MG/1
CAPSULE ORAL
COMMUNITY
Start: 2022-10-05 | End: 2022-11-02

## 2022-10-06 NOTE — PROGRESS NOTES
Chief Complaint   Patient presents with    Follow-up       1. Have you been to the ER, urgent care clinic since your last visit? Hospitalized since your last visit? No    2. Have you seen or consulted any other health care providers outside of the [address] [place] [address] [place] since your last visit? Include any pap smears or colon screening. Yes When: 10/5/2022 Where: [address]    3. For patients over 45: Has the patient had a colonoscopy? NA - based on age     If the patient is female:    4. For patients over 40: Has the patient had a mammogram? Yes - no Care Gap present    5. For patients over 21: Has the patient had a pap smear? Yes - no Care Gap present    3 most recent PHQ Screens 10/6/2022   Little interest or pleasure in doing things Not at all   Feeling down, depressed, irritable, or hopeless Several days   Total Score PHQ 2 1     Abuse Screening Questionnaire 10/6/2022   Do you ever feel afraid of your partner? N   Are you in a relationship with someone who physically or mentally threatens you? N   Is it safe for you to go home? Y       No flowsheet data found.     ADL Assessment 10/6/2022   Feeding yourself No Help Needed   Getting from bed to chair No Help Needed   Getting dressed No Help Needed   Bathing or showering No Help Needed   Walk across the room (includes cane/walker) No Help Needed   Using the telphone No Help Needed   Taking your medications No Help Needed   Preparing meals Help Needed   Managing money (expenses/bills) No Help Needed   Moderately strenuous housework (laundry) No Help Needed   Shopping for personal items (toiletries/medicines) Help Needed   Shopping for groceries Help Needed   Driving No Help Needed   Climbing a flight of stairs No Help Needed   Getting to places beyond walking distances No Help Needed     Health Maintenance Due   Topic Date Due    COVID-19 Vaccine (1) Never done    DTaP/Tdap/Td series (1 - Tdap) Never done    Flu Vaccine (1) Never done    Medicare Yearly Exam  08/10/2022

## 2022-10-06 NOTE — PROGRESS NOTES
Assessment/Plan   Education and counseling provided:  Are appropriate based on today's review and evaluation    Diagnoses and all orders for this visit:    1. Medicare annual wellness visit, subsequent    2. Prediabetes  -     AMB POC HEMOGLOBIN A1C  -     metFORMIN ER (GLUCOPHAGE XR) 500 mg tablet; 1 tablet by mouth daily before dinner for two weeks, then increase to 2 tablets by mouth daily before dinner. Much worse at 6.4% today. Initiate Metformin as above and advance to two tablets in two weeks if no GI upset. Follow up in three months for recheck. 3. DAISY (generalized anxiety disorder)  -     citalopram (CELEXA) 10 mg tablet; Take 1 Tablet by mouth daily. Not improved on Cymbalta. Discontinue and start Celexa. She will start in house counseling. 4. Prophylactic antibiotic  -     fluconazole (DIFLUCAN) 150 mg tablet; Take 1 Tablet by mouth daily for 1 day. FDA advises cautious prescribing of oral fluconazole in pregnancy. Amoxicillin causes yeast infection every time she requires dental work. Treatment as above PRN. Health Maintenance Due   Topic Date Due    COVID-19 Vaccine (1) Never done    DTaP/Tdap/Td series (1 - Tdap) Never done    Flu Vaccine (1) Never done    Medicare Yearly Exam  08/10/2022           SUBSEQUENT MEDICARE Lucio Barks  This is the Subsequent Medicare Annual Wellness Exam, performed 12 months or more after the Initial AWV or the last Subsequent AWV    I have reviewed the patient's medical history in detail and updated the computerized patient record. She is followed by cardiology for a history of CHF. She is here for follow up of anxiety disorder. She does not report improvement in her mood with the increase in Cymbalta. She is not currently in counseling. She reports sedation from Cymbalta. Denies SI/HI. She is a current tobacco user. She reports a history of prediabetes. She has not previously attempted treatment. She desires to lose weight.   She is not on a  particular exercise program.  She does not follow a specific diet plan. History     Past Medical History:   Diagnosis Date    Congestive heart disease ([address]     Endocrine disease     Grave's Disease    Other ill-defined conditions(799.89)     migraines    Other ill-defined conditions(799.89)     graves    Thyroid disease       Past Surgical History:   Procedure Laterality Date    HX HYSTERECTOMY  2012    HX MITRAL VALVE REPLACEMENT  2018    HX OTHER SURGICAL      removal of ink pen from abdomen    HX PACEMAKER  2021     Current Outpatient Medications   Medication Sig Dispense Refill    amoxicillin (AMOXIL) 500 mg capsule       fluconazole (DIFLUCAN) 150 mg tablet Take 1 Tablet by mouth daily for 1 day. FDA advises cautious prescribing of oral fluconazole in pregnancy. 1 Tablet 0    fluconazole (DIFLUCAN) 150 mg tablet Take 1 Tablet by mouth daily for 1 day. FDA advises cautious prescribing of oral fluconazole in pregnancy. 1 Tablet 3    citalopram (CELEXA) 10 mg tablet Take 1 Tablet by mouth daily. 30 Tablet 3    metFORMIN ER (GLUCOPHAGE XR) 500 mg tablet 1 tablet by mouth daily before dinner for two weeks, then increase to 2 tablets by mouth daily before dinner. 60 Tablet 1    sennosides (Senna) 8.6 mg cap Take  by mouth as needed for Constipation. Cetirizine (ZyrTEC) 10 mg cap Take  by mouth. spironolactone (ALDACTONE) 50 mg tablet TAKE 1 TABLET BY MOUTH TWO TIMES A DAY. 180 Tablet 1    atorvastatin (LIPITOR) 20 mg tablet TAKE 1 TABLET BY MOUTH EVERY DAY 90 Tablet 1    pantoprazole (PROTONIX) 20 mg tablet TAKE 1 TABLET BY MOUTH ONCE DAILY FOR 90 DAYS 90 Tablet 1    levothyroxine (SYNTHROID) 50 mcg tablet TAKE 1 TABLET BY MOUTH EVERY DAY 90 Tablet 1    torsemide (DEMADEX) 20 mg tablet Take 2 Tablets by mouth daily. 180 Tablet 3    sacubitriL-valsartan (Entresto) 24-26 mg tablet Take 1 Tablet by mouth two (2) times a day.  180 Tablet 1    ergocalciferol (ERGOCALCIFEROL) 1,250 mcg (50,000 unit) capsule TAKE 1 CAPSULE BY MOUTH EVERY 30 DAYS. 12 Capsule 3     Allergies   Allergen Reactions    Sulfamethoxazole-Trimethoprim Hives     Other reaction(s): Weal (disorder), swelling     Family History   Problem Relation Age of Onset    No Known Problems Mother     Heart Failure Father     Substance Abuse Father     Breast Cancer Maternal Grandmother 72    Hypertension Paternal Grandmother     Hypertension Sister     Asthma Sister     Diabetes Maternal Grandfather     Asthma Brother     No Known Problems Daughter     No Known Problems Son      Social History     Tobacco Use    Smoking status: Every Day     Packs/day: 0.25     Types: Cigarettes    Smokeless tobacco: Never    Tobacco comments:     5 cig a day   Substance Use Topics    Alcohol use: Yes     Comment: rarely     Patient Active Problem List   Diagnosis Code    History of Graves' disease Z86.39    Obesity, morbid ([facility] [address] E66.01    Chronic congestive heart failure (HCC) I50.9    Tobacco abuse Z72.0    Chronic renal disease, stage III N18.30    DAISY (generalized anxiety disorder) F41.1    Prediabetes R73.03       Depression Risk Factor Screening:     3 most recent PHQ Screens 10/6/2022   Little interest or pleasure in doing things Not at all   Feeling down, depressed, irritable, or hopeless Several days   Total Score PHQ 2 1     Alcohol Risk Factor Screening:   Do you average more than 1 drink per night or more than 7 drinks a week:  No    On any one occasion in the past three months have you have had more than 3 drinks containing alcohol:  No    Functional Ability and Level of Safety:   Hearing Loss  Hearing is good. Activities of Daily Living  The home contains: no safety equipment. Patient does total self care    Fall Risk  No flowsheet data found.     Abuse Screen  Patient is not abused    Cognitive Screening   Evaluation of Cognitive Function:  Has your family/caregiver stated any concerns about your memory: no  Normal    Patient Care Team   Patient Care Team:  Herb Mckeon NP as PCP - General (Nurse Practitioner)  Kaitlyn Owens NP as PCP - [facility] Empaneled Provider  Brian Fabian MD ([facility] Vascular Surgery)  Vilma Vasquez MD (Cardiovascular Disease Physician)

## 2022-10-26 ENCOUNTER — OFFICE VISIT (OUTPATIENT)
Dept: SOCIAL WORK | Age: 42
End: 2022-10-26
Payer: MEDICARE

## 2022-10-26 DIAGNOSIS — F33.0 MILD EPISODE OF RECURRENT MAJOR DEPRESSIVE DISORDER (HCC): Primary | ICD-10-CM

## 2022-10-26 PROCEDURE — G8432 DEP SCR NOT DOC, RNG: HCPCS | Performed by: SOCIAL WORKER

## 2022-10-26 PROCEDURE — 90791 PSYCH DIAGNOSTIC EVALUATION: CPT | Performed by: SOCIAL WORKER

## 2022-10-26 PROCEDURE — G8428 CUR MEDS NOT DOCUMENT: HCPCS | Performed by: SOCIAL WORKER

## 2022-10-26 PROCEDURE — G8417 CALC BMI ABV UP PARAM F/U: HCPCS | Performed by: SOCIAL WORKER

## 2022-10-26 NOTE — PROGRESS NOTES
In OFFICE-Initial Evaluation    Time Start:10:55 am  Time End:11:53 am    DX:     ICD-10-CM ICD-9-CM   1. Mild episode of recurrent major depressive disorder ([facility]  F33.0 296.31            Met with Ms. Roldan 10/26/2022 for our first appointment. This patient requested therapy from her NP, Ms. Sydney Lomas, and was referred for this appt today. Ms. Vinny Humphrey has been in therapy before but her therapist has since left the practice. She has not attended therapy for the past 1 year. She states she would like to \"get back to who I used to be\" but is having a hard time feeling any motivation or energy. She is home alone all day while her  of 8 years is working. Ms. Vinny Humphrey had a child when she was only 13years old and her daughter is now 27years old. She attended [employer] school here in [place] but after getting pregnant she completed her schooling at the [address] [place] [address] --with honors. Her biological parents  when she was 3years old. Her father  at age 64 but her mother, 79years old is still alive. She has 3 siblings from her mother's side and 6 from her father's. Ms. Vinny Humphrey worked until about 5 years ago but had to stop due to congestive heart issues, Grave disease and diabetes. 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 She misses the interaction of others and feels alone and isolated at home. This patient would like to address ways to cope with isolation, finding happiness in her daily life (outside of her ) and to be more physically strong. She loves to cook but states she can not stand long enough to make a complete meal.  Her  is very supportive and they have a strong marriage, per this patient. Viki Church is a 38 y.o. female who is alert and oriented X3.  she  does not have any suicidal or homicidal ideations.   Patient is not psychotic or delusional.   she has not been psychiatrically admitted to a hospital. Ms. Vinny Humphrey does have good eye contact during this interview. She is verbal and engaging. Patient does have good insight and judgement. she is a good candidate for short term therapy to address the above issues. We did not set a follow-up appointment with this clinician. Follow-up appt date (if applicable) is:  n/a  Julio Cesar Pacheco LCSW                .

## 2022-10-28 DIAGNOSIS — R73.03 PREDIABETES: ICD-10-CM

## 2022-10-30 DIAGNOSIS — Z86.39 HISTORY OF GRAVES' DISEASE: ICD-10-CM

## 2022-10-30 RX ORDER — METFORMIN HYDROCHLORIDE 500 MG/1
TABLET, EXTENDED RELEASE ORAL
Qty: 60 TABLET | Refills: 1 | Status: SHIPPED | OUTPATIENT
Start: 2022-10-30 | End: 2022-11-27

## 2022-10-31 DIAGNOSIS — Z79.2 PROPHYLACTIC ANTIBIOTIC: ICD-10-CM

## 2022-10-31 RX ORDER — LEVOTHYROXINE SODIUM 50 UG/1
TABLET ORAL
Qty: 90 TABLET | Refills: 1 | Status: SHIPPED | OUTPATIENT
Start: 2022-10-31

## 2022-11-02 RX ORDER — AMOXICILLIN 500 MG/1
CAPSULE ORAL
Qty: 4 CAPSULE | Refills: 2 | Status: SHIPPED | OUTPATIENT
Start: 2022-11-02

## 2022-11-04 ENCOUNTER — TELEPHONE (OUTPATIENT)
Dept: CARDIOLOGY CLINIC | Age: 42
End: 2022-11-04

## 2022-11-04 NOTE — TELEPHONE ENCOUNTER
Patient dropped off [employer] paperwork to be filled out for patient assistance for entresto. Herb Lafleur spoek with pt and let her know that the paper is ready for pickup.

## 2022-11-10 ENCOUNTER — APPOINTMENT (OUTPATIENT)
Dept: GENERAL RADIOLOGY | Age: 42
End: 2022-11-10
Attending: PHYSICIAN ASSISTANT
Payer: MEDICARE

## 2022-11-10 ENCOUNTER — HOSPITAL ENCOUNTER (EMERGENCY)
Age: 42
Discharge: HOME | End: 2022-11-10
Attending: STUDENT IN AN ORGANIZED HEALTH CARE EDUCATION/TRAINING PROGRAM
Payer: MEDICARE

## 2022-11-10 VITALS
BODY MASS INDEX: 43.4 KG/M2 | SYSTOLIC BLOOD PRESSURE: 98 MMHG | RESPIRATION RATE: 16 BRPM | OXYGEN SATURATION: 100 % | HEART RATE: 93 BPM | TEMPERATURE: 98 F | DIASTOLIC BLOOD PRESSURE: 63 MMHG | WEIGHT: 286.38 LBS | HEIGHT: 68 IN

## 2022-11-10 DIAGNOSIS — R05.9 COUGH, UNSPECIFIED TYPE: Primary | ICD-10-CM

## 2022-11-10 DIAGNOSIS — R07.9 CHEST PAIN, UNSPECIFIED TYPE: ICD-10-CM

## 2022-11-10 LAB
ALBUMIN SERPL-MCNC: 3.6 G/DL (ref 3.5–5)
ALBUMIN/GLOB SERPL: 0.7 {RATIO} (ref 1.1–2.2)
ALP SERPL-CCNC: 115 U/L (ref 45–117)
ALT SERPL-CCNC: 32 U/L (ref 12–78)
ANION GAP SERPL CALC-SCNC: 7 MMOL/L (ref 5–15)
AST SERPL-CCNC: 24 U/L (ref 15–37)
ATRIAL RATE: 92 BPM
BASOPHILS # BLD: 0 K/UL (ref 0–0.1)
BASOPHILS NFR BLD: 0 % (ref 0–1)
BILIRUB SERPL-MCNC: 0.3 MG/DL (ref 0.2–1)
BNP SERPL-MCNC: 149 PG/ML
BUN SERPL-MCNC: 11 MG/DL (ref 6–20)
BUN/CREAT SERPL: 10 (ref 12–20)
CALCIUM SERPL-MCNC: 9.7 MG/DL (ref 8.5–10.1)
CALCULATED P AXIS, ECG09: 37 DEGREES
CALCULATED R AXIS, ECG10: 82 DEGREES
CALCULATED T AXIS, ECG11: 42 DEGREES
CHLORIDE SERPL-SCNC: 102 MMOL/L (ref 97–108)
CO2 SERPL-SCNC: 29 MMOL/L (ref 21–32)
CREAT SERPL-MCNC: 1.07 MG/DL (ref 0.55–1.02)
D DIMER PPP FEU-MCNC: 0.63 MG/L FEU (ref 0–0.65)
DIAGNOSIS, 93000: NORMAL
DIFFERENTIAL METHOD BLD: ABNORMAL
EOSINOPHIL # BLD: 0.1 K/UL (ref 0–0.4)
EOSINOPHIL NFR BLD: 1 % (ref 0–7)
ERYTHROCYTE [DISTWIDTH] IN BLOOD BY AUTOMATED COUNT: 15 % (ref 11.5–14.5)
GLOBULIN SER CALC-MCNC: 5.2 G/DL (ref 2–4)
GLUCOSE SERPL-MCNC: 189 MG/DL (ref 65–100)
HCT VFR BLD AUTO: 37.3 % (ref 35–47)
HGB BLD-MCNC: 12.2 G/DL (ref 11.5–16)
IMM GRANULOCYTES # BLD AUTO: 0 K/UL (ref 0–0.04)
IMM GRANULOCYTES NFR BLD AUTO: 0 % (ref 0–0.5)
LYMPHOCYTES # BLD: 3.6 K/UL (ref 0.8–3.5)
LYMPHOCYTES NFR BLD: 38 % (ref 12–49)
MCH RBC QN AUTO: 29.8 PG (ref 26–34)
MCHC RBC AUTO-ENTMCNC: 32.7 G/DL (ref 30–36.5)
MCV RBC AUTO: 91 FL (ref 80–99)
MONOCYTES # BLD: 0.5 K/UL (ref 0–1)
MONOCYTES NFR BLD: 5 % (ref 5–13)
NEUTS SEG # BLD: 5.4 K/UL (ref 1.8–8)
NEUTS SEG NFR BLD: 56 % (ref 32–75)
NRBC # BLD: 0 K/UL (ref 0–0.01)
NRBC BLD-RTO: 0 PER 100 WBC
P-R INTERVAL, ECG05: 156 MS
PLATELET # BLD AUTO: 400 K/UL (ref 150–400)
PMV BLD AUTO: 9.1 FL (ref 8.9–12.9)
POTASSIUM SERPL-SCNC: 3.2 MMOL/L (ref 3.5–5.1)
PROT SERPL-MCNC: 8.8 G/DL (ref 6.4–8.2)
Q-T INTERVAL, ECG07: 370 MS
QRS DURATION, ECG06: 86 MS
QTC CALCULATION (BEZET), ECG08: 457 MS
RBC # BLD AUTO: 4.1 M/UL (ref 3.8–5.2)
SODIUM SERPL-SCNC: 138 MMOL/L (ref 136–145)
TROPONIN-HIGH SENSITIVITY: 8 NG/L (ref 0–51)
TROPONIN-HIGH SENSITIVITY: 8 NG/L (ref 0–51)
VENTRICULAR RATE, ECG03: 92 BPM
WBC # BLD AUTO: 9.7 K/UL (ref 3.6–11)

## 2022-11-10 PROCEDURE — 83880 ASSAY OF NATRIURETIC PEPTIDE: CPT

## 2022-11-10 PROCEDURE — 96374 THER/PROPH/DIAG INJ IV PUSH: CPT

## 2022-11-10 PROCEDURE — 74011250637 HC RX REV CODE- 250/637: Performed by: STUDENT IN AN ORGANIZED HEALTH CARE EDUCATION/TRAINING PROGRAM

## 2022-11-10 PROCEDURE — 74011250636 HC RX REV CODE- 250/636: Performed by: STUDENT IN AN ORGANIZED HEALTH CARE EDUCATION/TRAINING PROGRAM

## 2022-11-10 PROCEDURE — 80053 COMPREHEN METABOLIC PANEL: CPT

## 2022-11-10 PROCEDURE — 71046 X-RAY EXAM CHEST 2 VIEWS: CPT

## 2022-11-10 PROCEDURE — 85379 FIBRIN DEGRADATION QUANT: CPT

## 2022-11-10 PROCEDURE — 99285 EMERGENCY DEPT VISIT HI MDM: CPT

## 2022-11-10 PROCEDURE — 85025 COMPLETE CBC W/AUTO DIFF WBC: CPT

## 2022-11-10 PROCEDURE — 84484 ASSAY OF TROPONIN QUANT: CPT

## 2022-11-10 PROCEDURE — 93005 ELECTROCARDIOGRAM TRACING: CPT

## 2022-11-10 PROCEDURE — 36415 COLL VENOUS BLD VENIPUNCTURE: CPT

## 2022-11-10 RX ORDER — KETOROLAC TROMETHAMINE 30 MG/ML
15 INJECTION, SOLUTION INTRAMUSCULAR; INTRAVENOUS
Status: COMPLETED | OUTPATIENT
Start: 2022-11-10 | End: 2022-11-10

## 2022-11-10 RX ORDER — OXYCODONE AND ACETAMINOPHEN 5; 325 MG/1; MG/1
1 TABLET ORAL ONCE
Status: DISCONTINUED | OUTPATIENT
Start: 2022-11-10 | End: 2022-11-10 | Stop reason: HOSPADM

## 2022-11-10 RX ORDER — POTASSIUM CHLORIDE 750 MG/1
40 TABLET, FILM COATED, EXTENDED RELEASE ORAL
Status: COMPLETED | OUTPATIENT
Start: 2022-11-10 | End: 2022-11-10

## 2022-11-10 RX ORDER — LIDOCAINE 4 G/100G
PATCH TOPICAL
Qty: 5 EACH | Refills: 0 | Status: SHIPPED | OUTPATIENT
Start: 2022-11-10

## 2022-11-10 RX ADMIN — KETOROLAC TROMETHAMINE 15 MG: 30 INJECTION, SOLUTION INTRAMUSCULAR; INTRAVENOUS at 14:13

## 2022-11-10 RX ADMIN — POTASSIUM CHLORIDE 40 MEQ: 750 TABLET, FILM COATED, EXTENDED RELEASE ORAL at 14:13

## 2022-11-10 NOTE — ED PROVIDER NOTES
EMERGENCY DEPARTMENT HISTORY AND PHYSICAL EXAM      Date: 11/10/2022  Patient Name: Adalgisa Blank    History of Presenting Illness     Chief Complaint   Patient presents with    Chest Pain     Starting 2 days ago         HPI: Adalgisa Blank, 38 y.o. female presents to the ED with cc of chest pain, cough and shortness of breath. This has been going on for the past 2 weeks. She describes this as a substernal pressure that is worse at rest, and also a right-sided \"rib pain\" that is sharp and hurts when she coughs and breathes. She states that her cough is sometimes dry, sometimes productive of clear sputum. She denies any fevers. She denies any diaphoresis accompanying the chest pain, reports some nausea but states that it is separate than the chest pain. She has noted that both of her ankles are more swollen than usual lately. Denies new unilateral leg pain. She has been compliant with all of her medications including her torsemide. There are no other complaints, changes, or physical findings at this time. PCP: Jennifer Mckeon NP    No current facility-administered medications on file prior to encounter. Current Outpatient Medications on File Prior to Encounter   Medication Sig Dispense Refill    amoxicillin (AMOXIL) 500 mg capsule TAKE 4 CAPSULES BY MOUTH 30 MINUTES PRIOR TO DENTAL WORK 4 Capsule 2    levothyroxine (SYNTHROID) 50 mcg tablet TAKE 1 TABLET BY MOUTH EVERY DAY 90 Tablet 1    metFORMIN ER (GLUCOPHAGE XR) 500 mg tablet TAKE 1 TABLET BY MOUTH DAILY BEFORE DINNER FOR 2 WKS, THEN INCREASE TO 2 TABS BY MOUTH DAILY BEFORE DINNER. 60 Tablet 1    citalopram (CELEXA) 10 mg tablet Take 1 Tablet by mouth daily. 30 Tablet 3    sennosides (Senna) 8.6 mg cap Take  by mouth as needed for Constipation. Cetirizine (ZyrTEC) 10 mg cap Take  by mouth. spironolactone (ALDACTONE) 50 mg tablet TAKE 1 TABLET BY MOUTH TWO TIMES A DAY.  180 Tablet 1    atorvastatin (LIPITOR) 20 mg tablet TAKE 1 TABLET BY MOUTH EVERY DAY 90 Tablet 1    pantoprazole (PROTONIX) 20 mg tablet TAKE 1 TABLET BY MOUTH ONCE DAILY FOR 90 DAYS 90 Tablet 1    torsemide (DEMADEX) 20 mg tablet Take 2 Tablets by mouth daily. 180 Tablet 3    sacubitriL-valsartan (Entresto) 24-26 mg tablet Take 1 Tablet by mouth two (2) times a day. 180 Tablet 1    ergocalciferol (ERGOCALCIFEROL) 1,250 mcg (50,000 unit) capsule TAKE 1 CAPSULE BY MOUTH EVERY 30 DAYS. 12 Capsule 3       Past History     Past Medical History:  Past Medical History:   Diagnosis Date    Congestive heart disease ([facility] [address]     Endocrine disease     Grave's Disease    Other ill-defined conditions(799.89)     migraines    Other ill-defined conditions(799.89)     graves    Thyroid disease        Past Surgical History:  Past Surgical History:   Procedure Laterality Date    HX HYSTERECTOMY  2012    HX MITRAL VALVE REPLACEMENT  2018    HX OTHER SURGICAL      removal of ink pen from abdomen    HX PACEMAKER  2021       Family History:  Family History   Problem Relation Age of Onset    No Known Problems Mother     Heart Failure Father     Substance Abuse Father     Breast Cancer Maternal Grandmother 72    Hypertension Paternal Grandmother     Hypertension Sister     Asthma Sister     Diabetes Maternal Grandfather     Asthma Brother     No Known Problems Daughter     No Known Problems Son        Social History:  Social History     Tobacco Use    Smoking status: Every Day     Packs/day: 0.25     Types: Cigarettes    Smokeless tobacco: Never    Tobacco comments:     5 cig a day   Vaping Use    Vaping Use: Never used   Substance Use Topics    Alcohol use: Yes     Comment: rarely    Drug use: No       Allergies:   Allergies   Allergen Reactions    Sulfamethoxazole-Trimethoprim Hives     Other reaction(s): Weal (disorder), swelling         Review of Systems   no fever  No ear pain  No eye pain  Reports shortness of breath  Reports chest pain  no abdominal pain  no dysuria  no leg pain  No rash  No lymphadenopathy  No weight loss    Physical Exam   Physical Exam  Constitutional:       General: She is not in acute distress. Appearance: She is not toxic-appearing. HENT:      Head: Normocephalic. Eyes:      Extraocular Movements: Extraocular movements intact. Cardiovascular:      Rate and Rhythm: Normal rate and regular rhythm. Pulmonary:      Effort: Pulmonary effort is normal.      Breath sounds: Normal breath sounds. Abdominal:      Palpations: Abdomen is soft. Tenderness: There is no abdominal tenderness. Musculoskeletal:         General: No tenderness or deformity. Cervical back: Neck supple. Right lower leg: Edema present. Left lower leg: Edema present. Comments: Trace pitting edema of bilateral lower extremities   Skin:     General: Skin is warm and dry. Neurological:      General: No focal deficit present. Mental Status: She is alert.    Psychiatric:         Mood and Affect: Mood normal.       Diagnostic Study Results     Labs -     Recent Results (from the past 24 hour(s))   EKG, 12 LEAD, INITIAL    Collection Time: 11/10/22 12:18 PM   Result Value Ref Range    Ventricular Rate 92 BPM    Atrial Rate 92 BPM    P-R Interval 156 ms    QRS Duration 86 ms    Q-T Interval 370 ms    QTC Calculation (Bezet) 457 ms    Calculated P Axis 37 degrees    Calculated R Axis 82 degrees    Calculated T Axis 42 degrees    Diagnosis       Normal sinus rhythm  Normal ECG  When compared with ECG of 16-FEB-2012 07:40,  Nonspecific T wave abnormality now evident in Inferior leads  T wave amplitude has decreased in Anterior leads     CBC WITH AUTOMATED DIFF    Collection Time: 11/10/22 12:38 PM   Result Value Ref Range    WBC 9.7 3.6 - 11.0 K/uL    RBC 4.10 3.80 - 5.20 M/uL    HGB 12.2 11.5 - 16.0 g/dL    HCT 37.3 35.0 - 47.0 %    MCV 91.0 80.0 - 99.0 FL    MCH 29.8 26.0 - 34.0 PG    MCHC 32.7 30.0 - 36.5 g/dL    RDW 15.0 (H) 11.5 - 14.5 %    PLATELET 654 391 - 439 K/uL MPV 9.1 8.9 - 12.9 FL    NRBC 0.0 0  WBC    ABSOLUTE NRBC 0.00 0.00 - 0.01 K/uL    NEUTROPHILS 56 32 - 75 %    LYMPHOCYTES 38 12 - 49 %    MONOCYTES 5 5 - 13 %    EOSINOPHILS 1 0 - 7 %    BASOPHILS 0 0 - 1 %    IMMATURE GRANULOCYTES 0 0.0 - 0.5 %    ABS. NEUTROPHILS 5.4 1.8 - 8.0 K/UL    ABS. LYMPHOCYTES 3.6 (H) 0.8 - 3.5 K/UL    ABS. MONOCYTES 0.5 0.0 - 1.0 K/UL    ABS. EOSINOPHILS 0.1 0.0 - 0.4 K/UL    ABS. BASOPHILS 0.0 0.0 - 0.1 K/UL    ABS. IMM. GRANS. 0.0 0.00 - 0.04 K/UL    DF AUTOMATED     METABOLIC PANEL, COMPREHENSIVE    Collection Time: 11/10/22 12:38 PM   Result Value Ref Range    Sodium 138 136 - 145 mmol/L    Potassium 3.2 (L) 3.5 - 5.1 mmol/L    Chloride 102 97 - 108 mmol/L    CO2 29 21 - 32 mmol/L    Anion gap 7 5 - 15 mmol/L    Glucose 189 (H) 65 - 100 mg/dL    BUN 11 6 - 20 MG/DL    Creatinine 1.07 (H) 0.55 - 1.02 MG/DL    BUN/Creatinine ratio 10 (L) 12 - 20      eGFR >60 >60 ml/min/1.73m2    Calcium 9.7 8.5 - 10.1 MG/DL    Bilirubin, total 0.3 0.2 - 1.0 MG/DL    ALT (SGPT) 32 12 - 78 U/L    AST (SGOT) 24 15 - 37 U/L    Alk. phosphatase 115 45 - 117 U/L    Protein, total 8.8 (H) 6.4 - 8.2 g/dL    Albumin 3.6 3.5 - 5.0 g/dL    Globulin 5.2 (H) 2.0 - 4.0 g/dL    A-G Ratio 0.7 (L) 1.1 - 2.2     NT-PRO BNP    Collection Time: 11/10/22 12:38 PM   Result Value Ref Range    NT pro- (H) <125 PG/ML   TROPONIN-HIGH SENSITIVITY    Collection Time: 11/10/22 12:38 PM   Result Value Ref Range    Troponin-High Sensitivity 8 0 - 51 ng/L       Radiologic Studies -   XR CHEST PA LAT   Final Result   Clear lungs. CT Results  (Last 48 hours)      None          CXR Results  (Last 48 hours)                 11/10/22 1340  XR CHEST PA LAT Final result    Impression:  Clear lungs. Narrative:  PA AND LATERAL CHEST RADIOGRAPHS: 11/10/2022 1:40 PM       INDICATION: Chest pain. COMPARISON: 7/16/2013.        TECHNIQUE: Frontal and lateral radiographs of the chest. Dual energy subtraction   views were also obtained. FINDINGS:   The lungs are clear. The central airways are patent. The heart size is normal.   Post mitral valve replacement and pacemaker/AICD placement. No pneumothorax or   pleural effusion. Medical Decision Making   I am the first provider for this patient. I reviewed the vital signs, available nursing notes, past medical history, past surgical history, family history and social history. Vital Signs-Reviewed the patient's vital signs. Patient Vitals for the past 24 hrs:   Temp Pulse Resp BP SpO2   11/10/22 1334 -- -- -- 98/63 --   11/10/22 1222 98 °F (36.7 °C) 93 16 94/73 100 %         Provider Notes (Medical Decision Making):   25-year-old female presenting with chest pain, cough and shortness of breath. Differential includes atypical ACS, bronchitis, pneumonia, musculoskeletal pain, CHF, she is otherwise low risk for PE therefore D-dimer will be obtained. ED Course:     Initial assessment performed. The patients presenting problems have been discussed, and they are in agreement with the care plan formulated and outlined with them. I have encouraged them to ask questions as they arise throughout their visit. EKG is performed at 12: 18, shows sinus rhythm at a rate of 92, , QRS 86, QTc 457, axis upright, no ST segment elevation or depression concerning for ACS, this is interpreted as normal sinus rhythm    CBC negative for leukocytosis or anemia, basic metabolic panel with elevated creatinine of 1.67, hypokalemia 3.2 which should be replaced orally. Initial troponin reassuring at 8, BNP slightly elevated 150. Chest x-ray shows clear lungs. She is given Toradol. D-dimer reassuring at 0.63. Repeat troponin unchanged at 8. She is resting comfortably on reevaluation. Patient is counseled on supportive care and return precautions.  Will return to the ED for any worsening shortness of breath, pain, fevers, or any new or worrisome symptoms. Will followup with cardiology, primary care doctor within 4 days. Critical Care Time:         Disposition:  Home    PLAN:  1. Current Discharge Medication List        2.    Follow-up Information    None       Return to ED if worse     Diagnosis     Clinical Impression: Acute cough and atypical chest pain

## 2022-11-10 NOTE — ED NOTES
Dai ALANIZ reviewed discharge instructions with the patient. The patient verbalized understanding. All questions and concerns were addressed. The patient declined a wheelchair and is discharged ambulatory in the care of family members with instructions and prescriptions in hand. Pt is alert and oriented x 4. Respirations are clear and unlabored.

## 2022-11-16 DIAGNOSIS — I50.9 CHRONIC CONGESTIVE HEART FAILURE, UNSPECIFIED HEART FAILURE TYPE (HCC): ICD-10-CM

## 2022-11-17 RX ORDER — PANTOPRAZOLE SODIUM 20 MG/1
TABLET, DELAYED RELEASE ORAL
Qty: 90 TABLET | Refills: 1 | Status: SHIPPED | OUTPATIENT
Start: 2022-11-17

## 2022-11-25 DIAGNOSIS — R73.03 PREDIABETES: ICD-10-CM

## 2022-11-25 DIAGNOSIS — E78.2 MIXED HYPERLIPIDEMIA: ICD-10-CM

## 2022-11-27 RX ORDER — METFORMIN HYDROCHLORIDE 500 MG/1
TABLET, EXTENDED RELEASE ORAL
Qty: 60 TABLET | Refills: 1 | Status: SHIPPED | OUTPATIENT
Start: 2022-11-27

## 2022-11-27 RX ORDER — ATORVASTATIN CALCIUM 20 MG/1
TABLET, FILM COATED ORAL
Qty: 90 TABLET | Refills: 1 | Status: SHIPPED | OUTPATIENT
Start: 2022-11-27

## 2022-11-30 ENCOUNTER — PATIENT MESSAGE (OUTPATIENT)
Dept: CARDIOLOGY CLINIC | Age: 42
End: 2022-11-30

## 2022-11-30 DIAGNOSIS — Z95.810 AUTOMATIC IMPLANTABLE CARDIAC DEFIBRILLATOR IN SITU: Primary | ICD-10-CM

## 2022-11-30 DIAGNOSIS — I42.8 NON-ISCHEMIC CARDIOMYOPATHY (HCC): ICD-10-CM

## 2022-12-01 RX ORDER — SACUBITRIL AND VALSARTAN 24; 26 MG/1; MG/1
1 TABLET, FILM COATED ORAL 2 TIMES DAILY
Qty: 180 TABLET | Refills: 1 | Status: SHIPPED | OUTPATIENT
Start: 2022-12-01

## 2022-12-01 NOTE — TELEPHONE ENCOUNTER
Refilled per vo per MD    Future Appointments   Date Time Provider Darien Candie   1/4/2023  7:30 AM REMOTE1, CECIL PANTOJA BS AMB   3/1/2023 10:30 AM Faviola Mckeon NP PAFP BS AMB   3/9/2023 10:00 AM ECHOTWOCECIL BS AMB   3/9/2023 10:40 AM MD SCARLETT Harp BS AMB   11/14/2023  9:00 AM PACEMAKER3, CECIL PANTOJA BS AMB   11/14/2023  9:20 AM MD SCARLETT Decker BS AMB

## 2022-12-06 DIAGNOSIS — I42.8 NON-ISCHEMIC CARDIOMYOPATHY (HCC): ICD-10-CM

## 2022-12-06 RX ORDER — SACUBITRIL AND VALSARTAN 24; 26 MG/1; MG/1
1 TABLET, FILM COATED ORAL 2 TIMES DAILY
Qty: 36 TABLET | Refills: 0 | Status: SHIPPED | COMMUNITY
Start: 2022-12-06 | End: 2022-12-06 | Stop reason: SDUPTHER

## 2022-12-06 RX ORDER — SACUBITRIL AND VALSARTAN 24; 26 MG/1; MG/1
1 TABLET, FILM COATED ORAL 2 TIMES DAILY
Qty: 180 TABLET | Refills: 3 | Status: SHIPPED | OUTPATIENT
Start: 2022-12-06

## 2022-12-09 DIAGNOSIS — I50.9 CHRONIC CONGESTIVE HEART FAILURE, UNSPECIFIED HEART FAILURE TYPE (HCC): ICD-10-CM

## 2022-12-12 RX ORDER — SPIRONOLACTONE 50 MG/1
TABLET, FILM COATED ORAL
Qty: 180 TABLET | Refills: 1 | Status: SHIPPED | OUTPATIENT
Start: 2022-12-12

## 2022-12-12 NOTE — TELEPHONE ENCOUNTER
Refilled per VO per MD  Future Appointments   Date Time Provider Darien Candie   1/4/2023  7:30 AM REMOTE1, CECIL PANTOJA BS AMB   3/9/2023 10:00 AM ECHOTWOCECIL AMB   3/9/2023 10:40 AM MD SCARLETT Mancilla BS AMB   11/14/2023  9:00 AM PACEMAKER3, CECIL PANTOJA BS AMB   11/14/2023  9:20 AM MD SCARLETT Rios BS AMB

## 2022-12-16 ENCOUNTER — VIRTUAL VISIT (OUTPATIENT)
Dept: FAMILY MEDICINE CLINIC | Age: 42
End: 2022-12-16
Payer: MEDICARE

## 2022-12-16 ENCOUNTER — TELEPHONE (OUTPATIENT)
Dept: FAMILY MEDICINE CLINIC | Age: 42
End: 2022-12-16

## 2022-12-16 DIAGNOSIS — J06.9 ACUTE URI: ICD-10-CM

## 2022-12-16 DIAGNOSIS — N18.31 STAGE 3A CHRONIC KIDNEY DISEASE (HCC): ICD-10-CM

## 2022-12-16 DIAGNOSIS — R73.03 PREDIABETES: ICD-10-CM

## 2022-12-16 DIAGNOSIS — J06.9 ACUTE URI: Primary | ICD-10-CM

## 2022-12-16 PROCEDURE — G8432 DEP SCR NOT DOC, RNG: HCPCS | Performed by: NURSE PRACTITIONER

## 2022-12-16 PROCEDURE — G8427 DOCREV CUR MEDS BY ELIG CLIN: HCPCS | Performed by: NURSE PRACTITIONER

## 2022-12-16 PROCEDURE — 99213 OFFICE O/P EST LOW 20 MIN: CPT | Performed by: NURSE PRACTITIONER

## 2022-12-16 PROCEDURE — G8417 CALC BMI ABV UP PARAM F/U: HCPCS | Performed by: NURSE PRACTITIONER

## 2022-12-16 RX ORDER — DOXYCYCLINE 150 MG/1
150 TABLET ORAL 2 TIMES DAILY
Qty: 14 TABLET | Refills: 0 | Status: SHIPPED | OUTPATIENT
Start: 2022-12-16 | End: 2022-12-18 | Stop reason: ALTCHOICE

## 2022-12-16 RX ORDER — FLUCONAZOLE 150 MG/1
150 TABLET ORAL DAILY
Qty: 2 TABLET | Refills: 0 | Status: SHIPPED | OUTPATIENT
Start: 2022-12-16 | End: 2022-12-17

## 2022-12-16 RX ORDER — INSULIN PUMP SYRINGE, 3 ML
EACH MISCELLANEOUS
Qty: 1 KIT | Refills: 0 | Status: SHIPPED | OUTPATIENT
Start: 2022-12-16

## 2022-12-16 RX ORDER — LANCETS
EACH MISCELLANEOUS
Qty: 1 EACH | Refills: 11 | Status: SHIPPED | OUTPATIENT
Start: 2022-12-16

## 2022-12-16 RX ORDER — ALBUTEROL SULFATE 90 UG/1
2 AEROSOL, METERED RESPIRATORY (INHALATION)
Qty: 18 G | Refills: 1 | Status: SHIPPED | OUTPATIENT
Start: 2022-12-16

## 2022-12-16 RX ORDER — DOXYCYCLINE 150 MG/1
150 TABLET ORAL 2 TIMES DAILY
Qty: 14 TABLET | Refills: 0 | Status: CANCELLED | OUTPATIENT
Start: 2022-12-16 | End: 2022-12-23

## 2022-12-16 NOTE — PROGRESS NOTES
Assessment/Plan:     Diagnoses and all orders for this visit:    1. Acute URI  -     albuterol (PROVENTIL HFA, VENTOLIN HFA, PROAIR HFA) 90 mcg/actuation inhaler; Take 2 Puffs by inhalation every four (4) hours as needed for Wheezing. 2. Stage 3a chronic kidney disease ([facility] [address]  Assessment & Plan:   monitored by specialist. No acute findings meriting change in the plan      3. Prediabetes  -     Blood-Glucose Meter monitoring kit; [employer]. Test blood sugar twice daily. DX: E11.9. Substitute supply brand accepted by insurance. -     lancets misc; Test blood sugar twice daily. DX: E11.9. Substitute supply brand accepted by insurance. Re  -     glucose blood VI test strips strip; Test blood sugar twice daily. DX: E11.9. Substitute supply brand accepted by insurance. Other orders  -     fluconazole (DIFLUCAN) 150 mg tablet; Take 1 Tablet by mouth daily for 1 day. FDA advises cautious prescribing of oral fluconazole in pregnancy. The patient has consented for synchronous (real-time) Telemedicine (audio-video technology) on 12/16/2022 for their care to be delivered over telemedicine in place of their regularly scheduled office visit pursuant to the emergency declaration under the [address] [place] [address] and the [employer] Act, this Virtual  Visit was conducted by the practitioner who is located in the medical office in [place], [place], with patient's consent, to reduce the patient's risk of exposure to COVID-19 and provide continuity of care. Visit Length: 20 to 29 minutes    Follow-up and Dispositions    Return in about 3 months (around 3/16/2023) for Follow Up. Discussed expected course/resolution/complications of diagnosis in detail with patient. Medication risks/benefits/costs/interactions/alternatives discussed with patient.     Pt was given after visit summary which includes diagnoses, current medications & vitals. Pt expressed understanding with the diagnosis and plan          Subjective:      Zuri Pedraza is a 38 y.o. female who presents for had concerns including Cough. She reports a one month history of cough. She has been seen and prescribed antibiotics with some improvement. Prior chest xray was normal.  Cough is dry. She reports symptoms are stable over time. Patient Active Problem List   Diagnosis Code    History of Graves' disease Z86.39    Obesity, morbid ([facility] [address] E66.01    Chronic congestive heart failure ([facility] [address] I50.9    Tobacco abuse Z72.0    Chronic renal disease, stage III N18.30    DAISY (generalized anxiety disorder) F41.1    Prediabetes R73.03       Current Outpatient Medications   Medication Sig Dispense Refill    albuterol (PROVENTIL HFA, VENTOLIN HFA, PROAIR HFA) 90 mcg/actuation inhaler Take 2 Puffs by inhalation every four (4) hours as needed for Wheezing. 18 g 1    Blood-Glucose Meter monitoring kit [employer]ion [employer]. Test blood sugar twice daily. DX: E11.9. Substitute supply brand accepted by insurance. 1 Kit 0    lancets misc Test blood sugar twice daily. DX: E11.9. Substitute supply brand accepted by insurance. Re 1 Each 11    glucose blood VI test strips strip Test blood sugar twice daily. DX: E11.9. Substitute supply brand accepted by insurance. 100 Strip 1    spironolactone (ALDACTONE) 50 mg tablet TAKE 1 TABLET BY MOUTH TWICE A  Tablet 1    sacubitriL-valsartan (Entresto) 24-26 mg tablet Take 1 Tablet by mouth two (2) times a day. 180 Tablet 3    atorvastatin (LIPITOR) 20 mg tablet TAKE 1 TABLET BY MOUTH EVERY DAY 90 Tablet 1    pantoprazole (PROTONIX) 20 mg tablet TAKE 1 TABLET BY MOUTH EVERY DAY 90 Tablet 1    levothyroxine (SYNTHROID) 50 mcg tablet TAKE 1 TABLET BY MOUTH EVERY DAY 90 Tablet 1    torsemide (DEMADEX) 20 mg tablet Take 2 Tablets by mouth daily.  180 Tablet 3    metFORMIN ER (GLUCOPHAGE XR) 500 mg tablet Take 2 Tablets by mouth daily (with dinner). TAKE ONE TABLET DAILY BEFORE DINNER FOR 2 WEEKS, THEN INCREASE TO TWO TABLETS DAILY BEFORE DINNER 180 Tablet 1    doxycycline (ADOXA) 100 mg tablet Take 1 Tablet by mouth two (2) times a day for 7 days. 14 Tablet 0    dextromethorphan hbr (VICKS 44) 10 mg/5 mL liqd Take 5 mL by mouth every six (6) hours as needed for Cough. 118 mL 0    lidocaine 4 % patch Apply to affected area every 12 hours as needed for pain 5 Each 0    amoxicillin (AMOXIL) 500 mg capsule TAKE 4 CAPSULES BY MOUTH 30 MINUTES PRIOR TO DENTAL WORK 4 Capsule 2    citalopram (CELEXA) 10 mg tablet Take 1 Tablet by mouth daily. (Patient not taking: Reported on 12/16/2022) 30 Tablet 3    sennosides (Senna) 8.6 mg cap Take  by mouth as needed for Constipation. Cetirizine (ZyrTEC) 10 mg cap Take  by mouth.      ergocalciferol (ERGOCALCIFEROL) 1,250 mcg (50,000 unit) capsule TAKE 1 CAPSULE BY MOUTH EVERY 30 DAYS. 12 Capsule 3       Allergies   Allergen Reactions    Sulfamethoxazole-Trimethoprim Hives     Other reaction(s): Weal (disorder), swelling       ROS:   Review of Systems   Constitutional:  Negative for malaise/fatigue. Eyes:  Negative for blurred vision. Respiratory:  Positive for cough. Negative for shortness of breath. Cardiovascular:  Negative for chest pain. Objective:   Visit Vitals  LMP 02/17/2011       Vitals and Nurse Documentation reviewed. Physical Exam  Constitutional:       Appearance: Normal appearance. Neurological:      Mental Status: She is alert.    Psychiatric:         Attention and Perception: Attention normal.         Mood and Affect: Mood normal.         Speech: Speech normal.         Behavior: Behavior normal.         Cognition and Memory: Cognition normal.

## 2022-12-16 NOTE — TELEPHONE ENCOUNTER
Per [facility] the Vibramycin 150 mg tabs is not covered by pt's insurance plan. Please review and prescribe alternate therapy. Thank you. For Pharmacy Admin Tracking Only    Intervention Detail: New Rx: 1, reason: Patient Preference  Time Spent (min): 5      Requested Prescriptions     Pending Prescriptions Disp Refills    doxycycline monohydrate (VIBRAMYCIN) 150 mg tab tablet 14 Tablet 0     Sig: Take 1 Tablet by mouth two (2) times a day for 7 days.

## 2022-12-18 RX ORDER — DOXYCYCLINE 100 MG/1
100 TABLET ORAL 2 TIMES DAILY
Qty: 14 TABLET | Refills: 0 | Status: SHIPPED | OUTPATIENT
Start: 2022-12-18 | End: 2022-12-25

## 2023-01-04 ENCOUNTER — OFFICE VISIT (OUTPATIENT)
Dept: CARDIOLOGY CLINIC | Age: 43
End: 2023-01-04
Payer: MEDICARE

## 2023-01-04 ENCOUNTER — OFFICE VISIT (OUTPATIENT)
Dept: CARDIOLOGY CLINIC | Age: 43
End: 2023-01-04

## 2023-01-04 VITALS
RESPIRATION RATE: 15 BRPM | SYSTOLIC BLOOD PRESSURE: 124 MMHG | OXYGEN SATURATION: 100 % | HEIGHT: 68 IN | DIASTOLIC BLOOD PRESSURE: 80 MMHG | HEART RATE: 100 BPM | BODY MASS INDEX: 41.98 KG/M2 | WEIGHT: 277 LBS

## 2023-01-04 DIAGNOSIS — E66.01 OBESITY, CLASS III, BMI 40-49.9 (MORBID OBESITY) (HCC): ICD-10-CM

## 2023-01-04 DIAGNOSIS — I42.8 NON-ISCHEMIC CARDIOMYOPATHY (HCC): Primary | ICD-10-CM

## 2023-01-04 DIAGNOSIS — Z72.0 TOBACCO USE: ICD-10-CM

## 2023-01-04 DIAGNOSIS — Z95.810 AUTOMATIC IMPLANTABLE CARDIAC DEFIBRILLATOR IN SITU: ICD-10-CM

## 2023-01-04 DIAGNOSIS — E78.2 MIXED HYPERLIPIDEMIA: ICD-10-CM

## 2023-01-04 DIAGNOSIS — Z95.810 PRESENCE OF CARDIOVERTER DEFIBRILLATOR: Primary | ICD-10-CM

## 2023-01-04 RX ORDER — INSULIN GLARGINE 100 [IU]/ML
50 INJECTION, SOLUTION SUBCUTANEOUS DAILY
COMMUNITY
Start: 2022-12-31

## 2023-01-04 RX ORDER — INSULIN LISPRO 100 [IU]/ML
20 INJECTION, SOLUTION INTRAVENOUS; SUBCUTANEOUS
COMMUNITY
Start: 2022-12-31

## 2023-01-04 NOTE — LETTER
Patient:  Nikkie Baldwin   YOB: 1980  Date of Visit: 1/4/2023      Dear Rafaela Salas, NP  [address] [address]  Via In Basket:      Ms. Paco Banuelos is a 39 yo F with h/o non ischemic cardiomyopathy (cath 2018 and 2019 with no significant CAD; mild LAD/RCA plaquing) EF of 35-40% by echo 2021 (as low as 13% in past per pt), severe MR s/p MV repair 2018, status post [employer] ICD in 01/2021, HTN, mixed hyperlipidemia, h/o hyperthyroid, Grave's disease (treated with radiation isotope therapy in 2019). Was under consideration for LVAD/transplant at one point and followed by CHF clinic at [facility]. Cardiac MRI in 01/2018 with ejection fraction of 40% and findings of dilated nonischemic cardiomyopathy. Right heart catheterization in 2018, 2019 and 2020. In 08/2018 was status post mitral valve repair with just mild to moderate mitral regurgitation thereafter. Her echocardiograms in 2018 and 2019 through 2021 overall demonstrated ejection fraction between 35-40%. She had a recent hospitalization for 3 days at [facility] at the end of December. She was feeling weak and confused and was found to have very elevated blood sugars. She is now on insulin and her blood sugars been well managed. While she was in the hospital they did decrease her Entresto to half dosage. Her blood pressure is normal here and has been in the 281 systolic at home. Denies any exertional chest pain. Her breathing's been stable. She has been doing good with weight loss and her daughter I given her a watch to follow her exercise and activity and she is down from 287 to 277 pounds. She is compensated on exam with clear lungs she does have chronic 1+ bilateral lower extremity edema  Assessment and Plan:   1. Uncontrolled diabetes. She had a recent hospitalization for this was started on insulin and her blood sugars now been well controlled. No active cardiac issues. 2. Essential hypertension.   Her blood pressure is controlled and will have her continue at the lower dose of Entresto. If her blood pressure starts climbing greater than 621 systolic then would go back to a higher dose of Entresto. 3. Nonischemic cardiomyopathy. Stable and compensated. Will have her follow up with a same day echocardiogram as planned in March. 4. Lower extremity edema. Most consistent with venous insufficiency and this is out of proportion to her slight decrease in LV function. 5. Tobacco use. Encouraged cessation. 6. ICD. Non-sustained ventricular tachycardia. Stable on beta blocker. 7. Mixed hyperlipidemia. Tolerating statin. 8. Morbid obesity. Working on weight loss and she is making good improvements there. If you have questions, please do not hesitate to call me.       Sincerely,      Jose Alfredo Evans MD

## 2023-01-04 NOTE — PROGRESS NOTES
C/ MDT REMOTE  Scheduled remote transmission. Device functioning appropriately as programmed. See scanned documents.  Chargeable visit

## 2023-01-04 NOTE — PROGRESS NOTES
[place]: Renee Gitelman  [phone]    History of Present Illness:  Ms. Dusty Caceres is a 39 yo F with h/o non ischemic cardiomyopathy (cath 2018 and 2019 with no significant CAD; mild LAD/RCA plaquing) EF of 35-40% by echo 2021 (as low as 13% in past per pt), severe MR s/p MV repair 2018, status post [employer] ICD in 01/2021, HTN, mixed hyperlipidemia, h/o hyperthyroid, Grave's disease (treated with radiation isotope therapy in 2019). Was under consideration for LVAD/transplant at one point and followed by CHF clinic at [address] [place] [address]. Cardiac MRI in 01/2018 with ejection fraction of 40% and findings of dilated nonischemic cardiomyopathy. Right heart catheterization in 2018, 2019 and 2020. In 08/2018 was status post mitral valve repair with just mild to moderate mitral regurgitation thereafter. Her echocardiograms in 2018 and 2019 through 2021 overall demonstrated ejection fraction between 35-40%. She had a recent hospitalization for 3 days at [address] [place] [address] at the end of December. She was feeling weak and confused and was found to have very elevated blood sugars. She is now on insulin and her blood sugars been well managed. While she was in the hospital they did decrease her Entresto to half dosage. Her blood pressure is normal here and has been in the 464 systolic at home. Denies any exertional chest pain. Her breathing's been stable. She has been doing good with weight loss and her daughter I given her a watch to follow her exercise and activity and she is down from 287 to 277 pounds. She is compensated on exam with clear lungs she does have chronic 1+ bilateral lower extremity edema  Assessment and Plan:   Uncontrolled diabetes. She had a recent hospitalization for this was started on insulin and her blood sugars now been well controlled. No active cardiac issues. Essential hypertension. Her blood pressure is controlled and will have her continue at the lower dose of Entresto.   If her blood pressure starts climbing greater than 244 systolic then would go back to a higher dose of Entresto. Nonischemic cardiomyopathy. Stable and compensated. Will have her follow up with a same day echocardiogram as planned in March. Lower extremity edema. Most consistent with venous insufficiency and this is out of proportion to her slight decrease in LV function. Tobacco use. Encouraged cessation. ICD. Non-sustained ventricular tachycardia. Stable on beta blocker. Mixed hyperlipidemia. Tolerating statin. Morbid obesity. Working on weight loss and she is making good improvements there. She  has a past medical history of Congestive heart disease ([address] Endocrine disease, Other ill-defined conditions(799.89), Other ill-defined conditions(799.89), and Thyroid disease. All other systems negative except as above. PE  Vitals:    01/04/23 1035   BP: 124/80   Pulse: 100   Resp: 15   SpO2: 100%   Weight: 277 lb (125.6 kg)   Height: 5' 8\" (1.727 m)        Body mass index is 42.12 kg/m².    General appearance - alert, well appearing, and in no distress  Mental status - affect appropriate to mood  Eyes - sclera anicteric, moist mucous membranes  Neck - supple, no JVD  Chest - clear to auscultation, no wheezes, rales or rhonchi  Heart - normal rate, regular rhythm, normal S1, S2, I/VI systolic murmur LUSB  Abdomen - soft, nontender, nondistended, no masses or organomegaly  Neurological -no focal deficit  Extremities - peripheral pulses normal, no pedal edema      Recent Labs:  Lab Results   Component Value Date/Time    Cholesterol, total 131 03/09/2022 10:45 AM    HDL Cholesterol 41 03/09/2022 10:45 AM    LDL, calculated 74.2 03/09/2022 10:45 AM    Triglyceride 79 03/09/2022 10:45 AM    CHOL/HDL Ratio 3.2 03/09/2022 10:45 AM     Lab Results   Component Value Date/Time    Creatinine (POC) 0.4 (L) 06/06/2010 12:05 PM    Creatinine 1.07 (H) 11/10/2022 12:38 PM     Lab Results   Component Value Date/Time    BUN 11 11/10/2022 12:38 PM    BUN (POC) 9 06/06/2010 12:05 PM     Lab Results   Component Value Date/Time    Potassium 3.2 (L) 11/10/2022 12:38 PM     Lab Results   Component Value Date/Time    Hemoglobin A1c 5.9 (H) 08/09/2021 12:19 PM     Lab Results   Component Value Date/Time    Hemoglobin (POC) 11.2 (L) 06/06/2010 12:05 PM    HGB 12.2 11/10/2022 12:38 PM     Lab Results   Component Value Date/Time    PLATELET 200 31/12/6659 12:38 PM       Reviewed:  Past Medical History:   Diagnosis Date    Congestive heart disease ([facility]     Endocrine disease     Grave's Disease    Other ill-defined conditions(799.89)     migraines    Other ill-defined conditions(799.89)     graves    Thyroid disease      Social History     Tobacco Use   Smoking Status Every Day    Packs/day: 0.25    Types: Cigarettes   Smokeless Tobacco Never   Tobacco Comments    5 cig a day     Social History     Substance and Sexual Activity   Alcohol Use Yes    Comment: rarely     Allergies   Allergen Reactions    Sulfamethoxazole-Trimethoprim Hives     Other reaction(s): Weal (disorder), swelling       Current Outpatient Medications   Medication Sig    Lantus Solostar U-100 Insulin 100 unit/mL (3 mL) inpn 50 Units by SubCUTAneous route daily. HumaLOG KwikPen Insulin 100 unit/mL kwikpen 20 Units by SubCUTAneous route Before breakfast, lunch, and dinner. albuterol (PROVENTIL HFA, VENTOLIN HFA, PROAIR HFA) 90 mcg/actuation inhaler Take 2 Puffs by inhalation every four (4) hours as needed for Wheezing. Blood-Glucose Meter monitoring kit Relion Brand. Test blood sugar twice daily. DX: E11.9. Substitute supply brand accepted by insurance. lancets misc Test blood sugar twice daily. DX: E11.9. Substitute supply brand accepted by insurance. Re    glucose blood VI test strips strip Test blood sugar twice daily. DX: E11.9. Substitute supply brand accepted by insurance.     spironolactone (ALDACTONE) 50 mg tablet TAKE 1 TABLET BY MOUTH TWICE A DAY    sacubitriL-valsartan (Entresto) 24-26 mg tablet Take 1 Tablet by mouth two (2) times a day. (Patient taking differently: Take 0.5 Tablets by mouth two (2) times a day.)    atorvastatin (LIPITOR) 20 mg tablet TAKE 1 TABLET BY MOUTH EVERY DAY    pantoprazole (PROTONIX) 20 mg tablet TAKE 1 TABLET BY MOUTH EVERY DAY    dextromethorphan hbr (VICKS 44) 10 mg/5 mL liqd Take 5 mL by mouth every six (6) hours as needed for Cough. lidocaine 4 % patch Apply to affected area every 12 hours as needed for pain    amoxicillin (AMOXIL) 500 mg capsule TAKE 4 CAPSULES BY MOUTH 30 MINUTES PRIOR TO DENTAL WORK    levothyroxine (SYNTHROID) 50 mcg tablet TAKE 1 TABLET BY MOUTH EVERY DAY    sennosides (Senna) 8.6 mg cap Take  by mouth as needed for Constipation. Cetirizine (ZyrTEC) 10 mg cap Take  by mouth. torsemide (DEMADEX) 20 mg tablet Take 2 Tablets by mouth daily. ergocalciferol (ERGOCALCIFEROL) 1,250 mcg (50,000 unit) capsule TAKE 1 CAPSULE BY MOUTH EVERY 30 DAYS. metFORMIN ER (GLUCOPHAGE XR) 500 mg tablet Take 2 Tablets by mouth daily (with dinner). TAKE ONE TABLET DAILY BEFORE DINNER FOR 2 WEEKS, THEN INCREASE TO TWO TABLETS DAILY BEFORE DINNER (Patient not taking: Reported on 1/4/2023)    citalopram (CELEXA) 10 mg tablet Take 1 Tablet by mouth daily. (Patient not taking: No sig reported)     No current facility-administered medications for this visit.        Duane Cifuentes MD  [facility] [place]  [address] [facility] [address]  [address]

## 2023-01-05 ENCOUNTER — TELEPHONE (OUTPATIENT)
Dept: FAMILY MEDICINE CLINIC | Age: 43
End: 2023-01-05

## 2023-01-05 NOTE — TELEPHONE ENCOUNTER
----- Message from Raevn Mei sent at 1/5/2023 12:41 PM EST -----  Regarding: After visit summary   Calos Clancy,  I'm  trying to find out if I need to come in for a visit. I was admitted into [facility]  on 12/28  because my blood sugar was  990. I am now on insulin and my levels are lower but my vision is blurry I don't know if that's normal and will clear up.   Please give me a call because I can not read the messages in the portal. My number is  [phone]     Thanks in advance,   [facility]

## 2023-01-09 ENCOUNTER — TELEPHONE (OUTPATIENT)
Dept: CARDIOLOGY CLINIC | Age: 43
End: 2023-01-09

## 2023-01-11 ENCOUNTER — OFFICE VISIT (OUTPATIENT)
Dept: FAMILY MEDICINE CLINIC | Age: 43
End: 2023-01-11
Payer: MEDICARE

## 2023-01-11 VITALS
HEART RATE: 92 BPM | WEIGHT: 282.2 LBS | RESPIRATION RATE: 16 BRPM | DIASTOLIC BLOOD PRESSURE: 70 MMHG | SYSTOLIC BLOOD PRESSURE: 105 MMHG | HEIGHT: 68 IN | TEMPERATURE: 97.9 F | BODY MASS INDEX: 42.77 KG/M2 | OXYGEN SATURATION: 99 %

## 2023-01-11 DIAGNOSIS — E11.10 DIABETIC KETOACIDOSIS WITHOUT COMA ASSOCIATED WITH TYPE 2 DIABETES MELLITUS (HCC): Primary | ICD-10-CM

## 2023-01-11 DIAGNOSIS — Z09 HOSPITAL DISCHARGE FOLLOW-UP: ICD-10-CM

## 2023-01-11 DIAGNOSIS — N18.31 STAGE 3A CHRONIC KIDNEY DISEASE (HCC): ICD-10-CM

## 2023-01-11 DIAGNOSIS — H54.3 IMPAIRED VISION IN BOTH EYES: ICD-10-CM

## 2023-01-11 NOTE — PROGRESS NOTES
Chief Complaint   Patient presents with    Hospital Follow Up       1. Have you been to the ER, urgent care clinic since your last visit? Hospitalized since your last visit? Yes When: 12/28/2022 Where: [facility] Reason for visit: Diabetes    2. Have you seen or consulted any other health care providers outside of the [address] [place] [address] [place] since your last visit? Include any pap smears or colon screening. No    3. For patients over 45: Has the patient had a colonoscopy? Yes - no Care Gap present     If the patient is female:    4. For patients over 40: Has the patient had a mammogram? Yes - no Care Gap present    5. For patients over 21: Has the patient had a pap smear? Yes - no Care Gap present    3 most recent PHQ Screens 1/11/2023   Little interest or pleasure in doing things Not at all   Feeling down, depressed, irritable, or hopeless Several days   Total Score PHQ 2 1     Abuse Screening Questionnaire 1/11/2023   Do you ever feel afraid of your partner? N   Are you in a relationship with someone who physically or mentally threatens you? N   Is it safe for you to go home? Y       No flowsheet data found.     ADL Assessment 10/6/2022   Feeding yourself No Help Needed   Getting from bed to chair No Help Needed   Getting dressed No Help Needed   Bathing or showering No Help Needed   Walk across the room (includes cane/walker) No Help Needed   Using the telphone No Help Needed   Taking your medications No Help Needed   Preparing meals Help Needed   Managing money (expenses/bills) No Help Needed   Moderately strenuous housework (laundry) No Help Needed   Shopping for personal items (toiletries/medicines) Help Needed   Shopping for groceries Help Needed   Driving No Help Needed   Climbing a flight of stairs No Help Needed   Getting to places beyond walking distances No Help Needed     Health Maintenance Due   Topic Date Due    COVID-19 Vaccine (1) Never done    DTaP/Tdap/Td series (1 - Tdap) Never done    Flu Vaccine (1) Never done

## 2023-01-12 DIAGNOSIS — I42.8 NON-ISCHEMIC CARDIOMYOPATHY (HCC): ICD-10-CM

## 2023-01-12 RX ORDER — SACUBITRIL AND VALSARTAN 24; 26 MG/1; MG/1
1 TABLET, FILM COATED ORAL 2 TIMES DAILY
Qty: 180 TABLET | Refills: 3 | Status: SHIPPED | OUTPATIENT
Start: 2023-01-12

## 2023-01-12 NOTE — TELEPHONE ENCOUNTER
Received fax from [email or web address]. Patient has been approved for patient assistance. Requesting medication refill be sent to [facility]. Requested Prescriptions     Signed Prescriptions Disp Refills    sacubitriL-valsartan (Entresto) 24-26 mg tablet 180 Tablet 3     Sig: Take 1 Tablet by mouth two (2) times a day. Authorizing Provider: Natalya Bowels     Ordering User: Dmitriy Blanco     Identifiers x 2. Confirmed with patient that she is taking 1 tablet twice a day.      Verbal order per Dr. Dora Farrar    Future Appointments   Date Time Provider Darien Schreiber   3/9/2023 10:00 AM CECIL WELLS BS AMB   3/9/2023 10:40 AM MD SCARLETT Martinez BS AMB   4/5/2023 11:15 AM [email or web address] BS AMB   11/14/2023  9:00 AM [email or web address] BS AMB   11/14/2023  9:20 AM MD SCARLETT Shelby BS AMB

## 2023-01-21 NOTE — PROGRESS NOTES
Assessment/Plan:     Diagnoses and all orders for this visit:    1. Diabetic ketoacidosis without coma associated with type 2 diabetes mellitus ([facility] [address]  Resolved. Currently monitored very closely with [facility]. Continue current plan. We have discussed dietary changes in depth today to prevent long periods of fasting which has caused her several hypoglycemic episodes. 2. Stage 3a chronic kidney disease ([facility] [address]  Assessment & Plan:   well controlled, continue current medications      3. Impaired vision in both eyes   She was referred immediately to [facility] today and has an appointment this afternoon to check for retinopathy. Follow-up and Dispositions    Return in about 3 months (around 4/11/2023) for Follow Up. Discussed expected course/resolution/complications of diagnosis in detail with patient. Medication risks/benefits/costs/interactions/alternatives discussed with patient. Pt was given after visit summary which includes diagnoses, current medications & vitals. Pt expressed understanding with the diagnosis and plan          Subjective:      Luca Merino is a 38 y.o. female who presents for had concerns including Hospital Follow Up. She is accompanied by her  today. Hospital Follow Up  Luca Merino is seen for follow up from recent admission to [facility] on 12/28/2022. We reviewed the lab results, the notes, the records. She presented with confusion. She was found to be in DKA. She is taking her new insulin regimen as directed & without any side effects. She reports symptoms are improved. Medication Reconciliation reviewed today. She continues with significantly impaired vision which has not been evaluated. She is currently followed every other day through [facility] outpatient endocrinology and nurse navigator and has had tremendous improvement in her sugars.        Patient Active Problem List   Diagnosis Code    History of Graves' disease Z86.39    Obesity, morbid ([facility] [address] E66.01 Chronic congestive heart failure (HCC) I50.9    Tobacco abuse Z72.0    Chronic renal disease, stage III N18.30    DAISY (generalized anxiety disorder) F41.1    Prediabetes R73.03       Current Outpatient Medications   Medication Sig Dispense Refill    albuterol (PROVENTIL HFA, VENTOLIN HFA, PROAIR HFA) 90 mcg/actuation inhaler INHALE TWO PUFFS BY MOUTH EVERY 4 HOURS AS NEEDED FOR WHEEZING OR SHORTNESS OF BREATH 54 g 3    Lantus Solostar U-100 Insulin 100 unit/mL (3 mL) inpn 50 Units by SubCUTAneous route daily. HumaLOG KwikPen Insulin 100 unit/mL kwikpen 20 Units by SubCUTAneous route Before breakfast, lunch, and dinner. Blood-Glucose Meter monitoring kit [employer]. Test blood sugar twice daily. DX: E11.9. Substitute supply brand accepted by insurance. 1 Kit 0    lancets misc Test blood sugar twice daily. DX: E11.9. Substitute supply brand accepted by insurance. Re 1 Each 11    glucose blood VI test strips strip Test blood sugar twice daily. DX: E11.9. Substitute supply brand accepted by insurance. 100 Strip 1    spironolactone (ALDACTONE) 50 mg tablet TAKE 1 TABLET BY MOUTH TWICE A  Tablet 1    atorvastatin (LIPITOR) 20 mg tablet TAKE 1 TABLET BY MOUTH EVERY DAY 90 Tablet 1    pantoprazole (PROTONIX) 20 mg tablet TAKE 1 TABLET BY MOUTH EVERY DAY 90 Tablet 1    amoxicillin (AMOXIL) 500 mg capsule TAKE 4 CAPSULES BY MOUTH 30 MINUTES PRIOR TO DENTAL WORK 4 Capsule 2    levothyroxine (SYNTHROID) 50 mcg tablet TAKE 1 TABLET BY MOUTH EVERY DAY 90 Tablet 1    sennosides (Senna) 8.6 mg cap Take  by mouth as needed for Constipation. Cetirizine (ZyrTEC) 10 mg cap Take  by mouth. torsemide (DEMADEX) 20 mg tablet Take 2 Tablets by mouth daily. 180 Tablet 3    ergocalciferol (ERGOCALCIFEROL) 1,250 mcg (50,000 unit) capsule TAKE 1 CAPSULE BY MOUTH EVERY 30 DAYS. 12 Capsule 3    sacubitriL-valsartan (Entresto) 24-26 mg tablet Take 1 Tablet by mouth two (2) times a day.  180 Tablet 3       Allergies Allergen Reactions    Metformin Other (comments)     Significant fluid retention and GI intolerance    Sulfamethoxazole-Trimethoprim Hives     Other reaction(s): Weal (disorder), swelling       ROS:   Review of Systems   Constitutional:  Negative for malaise/fatigue. Eyes:  Positive for blurred vision. Respiratory:  Negative for shortness of breath. Cardiovascular:  Negative for chest pain. Objective:   Visit Vitals  /70 (BP 1 Location: Left upper arm, BP Patient Position: Sitting, BP Cuff Size: Large adult long)   Pulse 92   Temp 97.9 °F (36.6 °C)   Resp 16   Ht 5' 8\" (1.727 m)   Wt 282 lb 3.2 oz (128 kg)   LMP 02/17/2011   SpO2 99%   BMI 42.91 kg/m²       Vitals and Nurse Documentation reviewed. Physical Exam  Constitutional:       General: She is not in acute distress. Appearance: She is obese. Cardiovascular:      Heart sounds: S1 normal and S2 normal. No murmur heard. No friction rub. No gallop. Pulmonary:      Effort: No respiratory distress. Breath sounds: Normal breath sounds. Skin:     General: Skin is warm and dry.    Psychiatric:         Mood and Affect: Mood and affect normal.

## 2023-02-16 ENCOUNTER — TELEPHONE (OUTPATIENT)
Dept: CARDIOLOGY CLINIC | Age: 43
End: 2023-02-16

## 2023-02-16 NOTE — LETTER
2023       Ms. Lopez Brielle [address] [facility] [address]  [facility]-    Dear Shavonne Olson NP    The above patient is followed by Dr. Dianna Muhammad at [address] [place] [address] Cardiology. She informed our office that you would like to start trulicity. Dr. Ermias Stein has noted that he is ok with this and does not have any concerns with her current medications. Please call office @ [phone], if you have any questions or concerns.           Sincerely,      Garrett Sylvester RN

## 2023-02-16 NOTE — TELEPHONE ENCOUNTER
Communicated with patient via [address] Letter faxed to Nuyr Wolf, TARA @ [phone]. Confirmation received.

## 2023-03-06 ENCOUNTER — TELEPHONE (OUTPATIENT)
Dept: FAMILY MEDICINE CLINIC | Age: 43
End: 2023-03-06

## 2023-03-06 ENCOUNTER — PATIENT MESSAGE (OUTPATIENT)
Dept: FAMILY MEDICINE CLINIC | Age: 43
End: 2023-03-06

## 2023-03-06 DIAGNOSIS — I50.9 CHRONIC CONGESTIVE HEART FAILURE, UNSPECIFIED HEART FAILURE TYPE (HCC): ICD-10-CM

## 2023-03-06 DIAGNOSIS — R60.0 EDEMA, LOWER EXTREMITY: ICD-10-CM

## 2023-03-06 DIAGNOSIS — E78.2 MIXED HYPERLIPIDEMIA: ICD-10-CM

## 2023-03-06 RX ORDER — TORSEMIDE 20 MG/1
40 TABLET ORAL DAILY
Qty: 180 TABLET | Refills: 1 | Status: SHIPPED | OUTPATIENT
Start: 2023-03-06

## 2023-03-06 NOTE — TELEPHONE ENCOUNTER
Requested Prescriptions     Signed Prescriptions Disp Refills    torsemide (DEMADEX) 20 mg tablet 180 Tablet 1     Sig: Take 2 Tablets by mouth daily.      Authorizing Provider: Beatriz Díaz     Ordering User: Gavino Jensen     Verbal order per Dr. Dary Gonzalez.     Future Appointments   Date Time Provider Darien Schreiber   3/9/2023 10:00 AM CECIL WELLS BS AMB   3/16/2023 10:40 AM MD SCARLETT Armas BS AMB   4/5/2023 11:15 AM [email or web address] AMB   11/14/2023  9:00 AM [email or web address] AMB   11/14/2023  9:20 AM MD SCARLETT Botello BS AMB

## 2023-03-06 NOTE — TELEPHONE ENCOUNTER
Pt called, stated I received a call from the office but no VM left.  I did requested atorvastatin and pantoprazole refills I am  not sure if the call was related to the request.       BCB# [phone]

## 2023-03-07 ENCOUNTER — PATIENT MESSAGE (OUTPATIENT)
Dept: FAMILY MEDICINE CLINIC | Age: 43
End: 2023-03-07

## 2023-03-07 DIAGNOSIS — I50.9 CHRONIC CONGESTIVE HEART FAILURE, UNSPECIFIED HEART FAILURE TYPE (HCC): ICD-10-CM

## 2023-03-07 DIAGNOSIS — E78.2 MIXED HYPERLIPIDEMIA: ICD-10-CM

## 2023-03-07 RX ORDER — PANTOPRAZOLE SODIUM 20 MG/1
TABLET, DELAYED RELEASE ORAL
Qty: 90 TABLET | Refills: 1 | Status: CANCELLED | OUTPATIENT
Start: 2023-03-07

## 2023-03-07 RX ORDER — ATORVASTATIN CALCIUM 20 MG/1
20 TABLET, FILM COATED ORAL DAILY
Qty: 90 TABLET | Refills: 1 | Status: CANCELLED | OUTPATIENT
Start: 2023-03-07

## 2023-03-08 RX ORDER — ATORVASTATIN CALCIUM 20 MG/1
20 TABLET, FILM COATED ORAL DAILY
Qty: 90 TABLET | Refills: 1 | Status: SHIPPED | OUTPATIENT
Start: 2023-03-08

## 2023-03-08 RX ORDER — PANTOPRAZOLE SODIUM 20 MG/1
TABLET, DELAYED RELEASE ORAL
Qty: 90 TABLET | Refills: 1 | Status: SHIPPED | OUTPATIENT
Start: 2023-03-08

## 2023-03-09 ENCOUNTER — ANCILLARY PROCEDURE (OUTPATIENT)
Dept: CARDIOLOGY CLINIC | Age: 43
End: 2023-03-09

## 2023-03-09 VITALS — WEIGHT: 282 LBS | BODY MASS INDEX: 42.74 KG/M2 | HEIGHT: 68 IN

## 2023-03-09 DIAGNOSIS — R60.0 EDEMA, LOWER EXTREMITY: ICD-10-CM

## 2023-03-09 DIAGNOSIS — I42.8 NON-ISCHEMIC CARDIOMYOPATHY (HCC): ICD-10-CM

## 2023-03-09 DIAGNOSIS — E78.2 MIXED HYPERLIPIDEMIA: ICD-10-CM

## 2023-03-09 DIAGNOSIS — Z72.0 TOBACCO USE: ICD-10-CM

## 2023-03-09 LAB
ECHO AO ROOT DIAM: 2.8 CM
ECHO AO ROOT INDEX: 1.18 CM/M2
ECHO AV AREA PEAK VELOCITY: 2.8 CM2
ECHO AV AREA VTI: 2.6 CM2
ECHO AV AREA/BSA PEAK VELOCITY: 1.2 CM2/M2
ECHO AV AREA/BSA VTI: 1.1 CM2/M2
ECHO AV MEAN GRADIENT: 2 MMHG
ECHO AV MEAN VELOCITY: 0.7 M/S
ECHO AV PEAK GRADIENT: 4 MMHG
ECHO AV PEAK VELOCITY: 0.9 M/S
ECHO AV VELOCITY RATIO: 0.78
ECHO AV VTI: 16.7 CM
ECHO LA DIAMETER INDEX: 1.69 CM/M2
ECHO LA DIAMETER: 4 CM
ECHO LA TO AORTIC ROOT RATIO: 1.43
ECHO LA VOL 2C: 65 ML (ref 22–52)
ECHO LA VOL 4C: 54 ML (ref 22–52)
ECHO LA VOL BP: 59 ML (ref 22–52)
ECHO LA VOL/BSA BIPLANE: 25 ML/M2 (ref 16–34)
ECHO LA VOLUME AREA LENGTH: 66 ML
ECHO LA VOLUME INDEX A2C: 27 ML/M2 (ref 16–34)
ECHO LA VOLUME INDEX A4C: 23 ML/M2 (ref 16–34)
ECHO LA VOLUME INDEX AREA LENGTH: 28 ML/M2 (ref 16–34)
ECHO LV EDV A2C: 87 ML
ECHO LV EDV A4C: 133 ML
ECHO LV EDV BP: 110 ML (ref 56–104)
ECHO LV EDV INDEX A4C: 56 ML/M2
ECHO LV EDV INDEX BP: 46 ML/M2
ECHO LV EDV NDEX A2C: 37 ML/M2
ECHO LV EJECTION FRACTION A2C: 49 %
ECHO LV EJECTION FRACTION A4C: 42 %
ECHO LV EJECTION FRACTION BIPLANE: 45 % (ref 55–100)
ECHO LV ESV A2C: 45 ML
ECHO LV ESV A4C: 77 ML
ECHO LV ESV BP: 60 ML (ref 19–49)
ECHO LV ESV INDEX A2C: 19 ML/M2
ECHO LV ESV INDEX A4C: 32 ML/M2
ECHO LV ESV INDEX BP: 25 ML/M2
ECHO LV FRACTIONAL SHORTENING: 14 % (ref 28–44)
ECHO LV INTERNAL DIMENSION DIASTOLE INDEX: 1.81 CM/M2
ECHO LV INTERNAL DIMENSION DIASTOLIC: 4.3 CM (ref 3.9–5.3)
ECHO LV INTERNAL DIMENSION SYSTOLIC INDEX: 1.56 CM/M2
ECHO LV INTERNAL DIMENSION SYSTOLIC: 3.7 CM
ECHO LV IVSD: 1.1 CM (ref 0.6–0.9)
ECHO LV MASS 2D: 196.1 G (ref 67–162)
ECHO LV MASS INDEX 2D: 82.7 G/M2 (ref 43–95)
ECHO LV POSTERIOR WALL DIASTOLIC: 1.4 CM (ref 0.6–0.9)
ECHO LV RELATIVE WALL THICKNESS RATIO: 0.65
ECHO LVOT AREA: 3.8 CM2
ECHO LVOT AV VTI INDEX: 0.69
ECHO LVOT DIAM: 2.2 CM
ECHO LVOT MEAN GRADIENT: 1 MMHG
ECHO LVOT PEAK GRADIENT: 2 MMHG
ECHO LVOT PEAK VELOCITY: 0.7 M/S
ECHO LVOT STROKE VOLUME INDEX: 18.4 ML/M2
ECHO LVOT SV: 43.7 ML
ECHO LVOT VTI: 11.5 CM
ECHO MV A VELOCITY: 1.02 M/S
ECHO MV AREA VTI: 1.1 CM2
ECHO MV E DECELERATION TIME (DT): 272.7 MS
ECHO MV E VELOCITY: 1.04 M/S
ECHO MV E/A RATIO: 1.02
ECHO MV LVOT VTI INDEX: 3.38
ECHO MV MAX VELOCITY: 1.3 M/S
ECHO MV MEAN GRADIENT: 4 MMHG
ECHO MV MEAN VELOCITY: 1 M/S
ECHO MV PEAK GRADIENT: 7 MMHG
ECHO MV VTI: 38.9 CM
ECHO PV MAX VELOCITY: 0.7 M/S
ECHO PV PEAK GRADIENT: 2 MMHG
ECHO TV REGURGITANT MAX VELOCITY: 2.27 M/S
ECHO TV REGURGITANT PEAK GRADIENT: 21 MMHG

## 2023-03-10 ENCOUNTER — TELEPHONE (OUTPATIENT)
Dept: CARDIOLOGY CLINIC | Age: 43
End: 2023-03-10

## 2023-03-10 NOTE — TELEPHONE ENCOUNTER
Identifiers x 2. Informed of echo results. Confirmed follow up appointment. Verbalized understanding.      Future Appointments   Date Time Provider Darien Candie   3/16/2023 10:40 AM MD SCARLETT Colin BS AMB   4/5/2023 11:15 AM REMOTE1, CECIL BURCH AMB   11/14/2023  9:00 AM PACEMAKER3, CECIL BURCH AMB   11/14/2023  9:20 AM MD SCARLETT Burdick BS AMB

## 2023-03-10 NOTE — TELEPHONE ENCOUNTER
----- Message from Pramod Mireles MD sent at 3/9/2023  2:40 PM EST -----  Please let pt know echo was overall was unchanged EF 40-45%, mild moderate mitral regurgitation.  thx

## 2023-03-16 ENCOUNTER — OFFICE VISIT (OUTPATIENT)
Dept: CARDIOLOGY CLINIC | Age: 43
End: 2023-03-16

## 2023-03-16 VITALS
HEIGHT: 68 IN | RESPIRATION RATE: 16 BRPM | HEART RATE: 93 BPM | SYSTOLIC BLOOD PRESSURE: 125 MMHG | BODY MASS INDEX: 42.74 KG/M2 | DIASTOLIC BLOOD PRESSURE: 73 MMHG | WEIGHT: 282 LBS | OXYGEN SATURATION: 98 %

## 2023-03-16 DIAGNOSIS — I42.8 NON-ISCHEMIC CARDIOMYOPATHY (HCC): Primary | ICD-10-CM

## 2023-03-16 DIAGNOSIS — Z98.890 S/P MVR (MITRAL VALVE REPAIR): ICD-10-CM

## 2023-03-16 DIAGNOSIS — I10 BENIGN ESSENTIAL HTN: ICD-10-CM

## 2023-03-16 DIAGNOSIS — Z72.0 TOBACCO USE: ICD-10-CM

## 2023-03-16 NOTE — PROGRESS NOTES
[facility] [place]: Boy [place]  [phone]    History of Present Illness:  Ms. Judy Esparza is a 38 yo F with h/o non ischemic cardiomyopathy (cath 2018 and 2019 with no significant CAD; mild LAD/RCA plaquing) EF of 35-40% by echo 2021 (as low as 13% in past per pt), severe MR s/p MV repair 2018, status post Medtronic ICD in 01/2021, HTN, mixed hyperlipidemia, h/o hyperthyroid, Grave's disease (treated with radiation isotope therapy in 2019). Was under consideration for LVAD/transplant at one point and followed by CHF clinic at [facility]. Cardiac MRI in 01/2018 with ejection fraction of 40% and findings of dilated nonischemic cardiomyopathy. Right heart catheterization in 2018, 2019 and 2020. In 08/2018 was status post mitral valve repair with just mild to moderate mitral regurgitation thereafter. Her echocardiograms in 2018 and 2019 through 2021 overall demonstrated ejection fraction between 35-40%. 3/2023 echo EF 40-45%. Since her last visit, she notes her diabetes is still being adjusted and her blood sugars that were high before are now getting too low. She did see endocrinology, Dr. Daniel Lewis and Dr. Rigo Ugarte recently. They are thinking about starting her on Jardiance and wanted to get an okay from us and we will go ahead and send correspondence letting them know that is okay. She denies any exertional chest pain or shortness of breath. No significant palpitations. Her echocardiogram was unchanged with an EF of 40-45% and mild LVH with mild to moderate mitral regurgitation. She is compensated on exam with clear lungs. She does have chronic trace to 1+ bilateral lower extremity edema. Assessment and Plan:   1. Nonischemic cardiomyopathy, EF of 40-45%. Stable and compensated and will continue her current cardiac regimen. Will have her follow back in six months. Consider repeat echocardiogram in one year. 2. Uncontrolled diabetes.   She is seen closely by endocrinology and they are thinking about Jardiance and I see no cardiac contraindication to this and there is evidence for cardiovascular benefit. 3. Essential hypertension. Blood pressure is controlled and no changes made. 4. Lower extremity edema. Partly venous insufficiency. 5. Tobacco use. Encouraged cessation. One pack is lasting her two days. 6. ICD. Non-sustained ventricular tachycardia. Stable on beta blocker. 7. Mixed hyperlipidemia. Tolerating statin. 8. Morbid obesity. Working on weight loss. She  has a past medical history of Congestive heart disease ([facility] [address] Diabetes ([facility] [address] Endocrine disease, Other ill-defined conditions(799.89), Other ill-defined conditions(799.89), and Thyroid disease. All other systems negative except as above. PE  Vitals:    03/16/23 1043   BP: 125/73   Pulse: 93   Resp: 16   SpO2: 98%   Weight: 282 lb (127.9 kg)   Height: 5' 8\" (1.727 m)        Body mass index is 42.88 kg/m².    General appearance - alert, well appearing, and in no distress  Mental status - affect appropriate to mood  Eyes - sclera anicteric, moist mucous membranes  Neck - supple, no JVD  Chest - clear to auscultation, no wheezes, rales or rhonchi  Heart - normal rate, regular rhythm, normal S1, S2, I/VI systolic murmur LUSB  Abdomen - soft, nontender, nondistended, no masses or organomegaly  Neurological -no focal deficit  Extremities - peripheral pulses normal, no pedal edema      Recent Labs:  Lab Results   Component Value Date/Time    Cholesterol, total 131 03/09/2022 10:45 AM    HDL Cholesterol 41 03/09/2022 10:45 AM    LDL, calculated 74.2 03/09/2022 10:45 AM    Triglyceride 79 03/09/2022 10:45 AM    CHOL/HDL Ratio 3.2 03/09/2022 10:45 AM     Lab Results   Component Value Date/Time    Creatinine (POC) 0.4 (L) 06/06/2010 12:05 PM    Creatinine 1.07 (H) 11/10/2022 12:38 PM     Lab Results   Component Value Date/Time    BUN 11 11/10/2022 12:38 PM    BUN (POC) 9 06/06/2010 12:05 PM     Lab Results   Component Value Date/Time    Potassium 3.2 (L) 11/10/2022 12:38 PM     Lab Results   Component Value Date/Time    Hemoglobin A1c 5.9 (H) 08/09/2021 12:19 PM     Lab Results   Component Value Date/Time    Hemoglobin (POC) 11.2 (L) 06/06/2010 12:05 PM    HGB 12.2 11/10/2022 12:38 PM     Lab Results   Component Value Date/Time    PLATELET 917 98/68/2384 12:38 PM       Reviewed:  Past Medical History:   Diagnosis Date    Congestive heart disease ([facility] [address]     Diabetes ([facility] [address]     Endocrine disease     Grave's Disease    Other ill-defined conditions(799.89)     migraines    Other ill-defined conditions(799.89)     graves    Thyroid disease      Social History     Tobacco Use   Smoking Status Every Day    Packs/day: 0.25    Types: Cigarettes   Smokeless Tobacco Never   Tobacco Comments    5 cig a day     Social History     Substance and Sexual Activity   Alcohol Use Yes    Comment: rarely     Allergies   Allergen Reactions    Metformin Other (comments)     Significant fluid retention and GI intolerance    Sulfamethoxazole-Trimethoprim Hives     Other reaction(s): Weal (disorder), swelling       Current Outpatient Medications   Medication Sig    pantoprazole (PROTONIX) 20 mg tablet TAKE 1 TABLET BY MOUTH EVERY DAY    atorvastatin (LIPITOR) 20 mg tablet Take 1 Tablet by mouth daily. torsemide (DEMADEX) 20 mg tablet Take 2 Tablets by mouth daily. levothyroxine (SYNTHROID) 50 mcg tablet TAKE 1 CAPSULE BY MOUTH EVERY DAY    sacubitriL-valsartan (Entresto) 24-26 mg tablet Take 1 Tablet by mouth two (2) times a day. albuterol (PROVENTIL HFA, VENTOLIN HFA, PROAIR HFA) 90 mcg/actuation inhaler INHALE TWO PUFFS BY MOUTH EVERY 4 HOURS AS NEEDED FOR WHEEZING OR SHORTNESS OF BREATH    Lantus Solostar U-100 Insulin 100 unit/mL (3 mL) inpn 50 Units by SubCUTAneous route daily. Blood-Glucose Meter monitoring kit Relion Brand. Test blood sugar twice daily. DX: E11.9. Substitute supply brand accepted by insurance.     lancets misc Test blood sugar twice daily. DX: E11.9. Substitute supply brand accepted by insurance. Re    glucose blood VI test strips strip Test blood sugar twice daily. DX: E11.9. Substitute supply brand accepted by insurance. spironolactone (ALDACTONE) 50 mg tablet TAKE 1 TABLET BY MOUTH TWICE A DAY    amoxicillin (AMOXIL) 500 mg capsule TAKE 4 CAPSULES BY MOUTH 30 MINUTES PRIOR TO DENTAL WORK    sennosides (Senna) 8.6 mg cap Take  by mouth as needed for Constipation. Cetirizine (ZyrTEC) 10 mg cap Take  by mouth.    ergocalciferol (ERGOCALCIFEROL) 1,250 mcg (50,000 unit) capsule TAKE 1 CAPSULE BY MOUTH EVERY 30 DAYS. HumaLOG KwikPen Insulin 100 unit/mL kwikpen 20 Units by SubCUTAneous route Before breakfast, lunch, and dinner. No current facility-administered medications for this visit.        Evelyn Evans MD  [employer] [facility] [place]  [address] [facility] [address]  [address]

## 2023-03-16 NOTE — LETTER
Patient:  Harshad Arnold   YOB: 1980  Date of Visit: 3/16/2023    Dear Mathew Wright, NP  [address] [facility] [address]  Via In [facility] [address] [id number]  Via Fax: [phone]:      Ms. Luly Vines is a 36 yo F with h/o non ischemic cardiomyopathy (cath 2018 and 2019 with no significant CAD; mild LAD/RCA plaquing) EF of 35-40% by echo 2021 (as low as 13% in past per pt), severe MR s/p MV repair 2018, status post Medtronic ICD in 01/2021, HTN, mixed hyperlipidemia, h/o hyperthyroid, Grave's disease (treated with radiation isotope therapy in 2019). Was under consideration for LVAD/transplant at one point and followed by CHF clinic at [address] [place] [address]. Cardiac MRI in 01/2018 with ejection fraction of 40% and findings of dilated nonischemic cardiomyopathy. Right heart catheterization in 2018, 2019 and 2020. In 08/2018 was status post mitral valve repair with just mild to moderate mitral regurgitation thereafter. Her echocardiograms in 2018 and 2019 through 2021 overall demonstrated ejection fraction between 35-40%. 3/2023 echo EF 40-45%. Since her last visit, she notes her diabetes is still being adjusted and her blood sugars that were high before are now getting too low. She did see endocrinology, Dr. Sharyle Slimmer and Dr. Isaiah Guaman recently. They are thinking about starting her on Jardiance and wanted to get an okay from us and we will go ahead and send correspondence letting them know that is okay. She denies any exertional chest pain or shortness of breath. No significant palpitations. Her echocardiogram was unchanged with an EF of 40-45% and mild LVH with mild to moderate mitral regurgitation. She is compensated on exam with clear lungs. She does have chronic trace to 1+ bilateral lower extremity edema. Assessment and Plan:   1. Nonischemic cardiomyopathy, EF of 40-45%.   Stable and compensated and will continue her current cardiac regimen. Will have her follow back in six months. Consider repeat echocardiogram in one year. 2. Uncontrolled diabetes. She is seen closely by endocrinology and they are thinking about Jardiance and I see no cardiac contraindication to this and there is evidence for cardiovascular benefit. 3. Essential hypertension. Blood pressure is controlled and no changes made. 4. Lower extremity edema. Partly venous insufficiency. 5. Tobacco use. Encouraged cessation. One pack is lasting her two days. 6. ICD. Non-sustained ventricular tachycardia. Stable on beta blocker. 7. Mixed hyperlipidemia. Tolerating statin. 8. Morbid obesity. Working on weight loss. If you have questions, please do not hesitate to call me.       Sincerely,      Nils Argueta MD

## 2023-03-20 DIAGNOSIS — I50.9 CHRONIC CONGESTIVE HEART FAILURE, UNSPECIFIED HEART FAILURE TYPE (HCC): ICD-10-CM

## 2023-03-20 RX ORDER — SPIRONOLACTONE 50 MG/1
50 TABLET, FILM COATED ORAL 2 TIMES DAILY
Qty: 180 TABLET | Refills: 1 | Status: SHIPPED | OUTPATIENT
Start: 2023-03-20

## 2023-03-20 NOTE — TELEPHONE ENCOUNTER
Requested Prescriptions     Signed Prescriptions Disp Refills    spironolactone (ALDACTONE) 50 mg tablet 180 Tablet 1     Sig: Take 1 Tablet by mouth two (2) times a day.      Authorizing Provider: Candelario Curiel     Ordering User: Kenton Rios     Verbal order per Dr. Zamzam Liz.     Future Appointments   Date Time Provider Darien Schreiber   4/5/2023 11:15 AM REMOTE1, Basil Rowley BS AMB   9/18/2023 11:20 AM MD SCARLETT Felipe BS AMB   11/14/2023  9:00 AM PACEMAKER3, CECIL PANTOJA BS AMB   11/14/2023  9:20 AM MD SCARLETT Cabrera BS AMB

## 2023-03-27 DIAGNOSIS — I50.9 CHRONIC CONGESTIVE HEART FAILURE, UNSPECIFIED HEART FAILURE TYPE (HCC): ICD-10-CM

## 2023-03-27 RX ORDER — ERGOCALCIFEROL 1.25 MG/1
CAPSULE ORAL
Qty: 36 CAPSULE | Refills: 5 | Status: SHIPPED | OUTPATIENT
Start: 2023-03-27

## 2023-05-04 LAB
AVERAGE GLUCOSE: NORMAL
HBA1C MFR BLD: 6 %

## 2023-08-02 ENCOUNTER — OFFICE VISIT (OUTPATIENT)
Age: 43
End: 2023-08-02
Payer: MEDICARE

## 2023-08-02 VITALS
OXYGEN SATURATION: 98 % | BODY MASS INDEX: 42.77 KG/M2 | TEMPERATURE: 97.4 F | RESPIRATION RATE: 16 BRPM | HEIGHT: 68 IN | DIASTOLIC BLOOD PRESSURE: 65 MMHG | HEART RATE: 85 BPM | SYSTOLIC BLOOD PRESSURE: 92 MMHG | WEIGHT: 282.2 LBS

## 2023-08-02 DIAGNOSIS — R73.03 PREDIABETES: ICD-10-CM

## 2023-08-02 DIAGNOSIS — Z12.11 ENCOUNTER FOR SCREENING COLONOSCOPY: ICD-10-CM

## 2023-08-02 DIAGNOSIS — E03.9 ACQUIRED HYPOTHYROIDISM: Primary | ICD-10-CM

## 2023-08-02 DIAGNOSIS — I50.22 CHRONIC SYSTOLIC (CONGESTIVE) HEART FAILURE (HCC): ICD-10-CM

## 2023-08-02 DIAGNOSIS — E11.9 DIABETES MELLITUS TYPE 2, DIET-CONTROLLED (HCC): ICD-10-CM

## 2023-08-02 DIAGNOSIS — Z72.0 TOBACCO USE: ICD-10-CM

## 2023-08-02 PROBLEM — E11.22 TYPE 2 DIABETES MELLITUS WITH CHRONIC KIDNEY DISEASE (HCC): Status: ACTIVE | Noted: 2023-08-02

## 2023-08-02 PROCEDURE — 99214 OFFICE O/P EST MOD 30 MIN: CPT | Performed by: NURSE PRACTITIONER

## 2023-08-02 RX ORDER — LEVOTHYROXINE SODIUM 0.07 MG/1
75 TABLET ORAL DAILY
Qty: 30 TABLET | Refills: 3 | Status: SHIPPED | OUTPATIENT
Start: 2023-08-02

## 2023-08-02 RX ORDER — BLOOD-GLUCOSE METER
KIT MISCELLANEOUS
COMMUNITY
Start: 2022-12-16

## 2023-08-02 SDOH — ECONOMIC STABILITY: FOOD INSECURITY: WITHIN THE PAST 12 MONTHS, YOU WORRIED THAT YOUR FOOD WOULD RUN OUT BEFORE YOU GOT MONEY TO BUY MORE.: PATIENT DECLINED

## 2023-08-02 SDOH — ECONOMIC STABILITY: FOOD INSECURITY: WITHIN THE PAST 12 MONTHS, THE FOOD YOU BOUGHT JUST DIDN'T LAST AND YOU DIDN'T HAVE MONEY TO GET MORE.: PATIENT DECLINED

## 2023-08-02 SDOH — ECONOMIC STABILITY: HOUSING INSECURITY
IN THE LAST 12 MONTHS, WAS THERE A TIME WHEN YOU DID NOT HAVE A STEADY PLACE TO SLEEP OR SLEPT IN A SHELTER (INCLUDING NOW)?: PATIENT REFUSED

## 2023-08-02 SDOH — ECONOMIC STABILITY: INCOME INSECURITY: HOW HARD IS IT FOR YOU TO PAY FOR THE VERY BASICS LIKE FOOD, HOUSING, MEDICAL CARE, AND HEATING?: PATIENT DECLINED

## 2023-08-03 ASSESSMENT — ENCOUNTER SYMPTOMS: SHORTNESS OF BREATH: 0

## 2023-08-04 NOTE — PROGRESS NOTES
No chief complaint on file. 1. Have you been to the ER, urgent care clinic since your last visit? Hospitalized since your last visit?         no    2. Have you seen or consulted any other health care providers outside of the [address] [place] [address] since your last visit? Include any pap smears or colon screening. yes - [facility]    3. For patients over 45: Has the patient had a colonoscopy? N/A     If the patient is female:    4. For patients over 40: Has the patient had a mammogram? Yes    5. For patients over 21: Has the patient had a pap smear? Yes        No flowsheet data found. No flowsheet data found.   Health Maintenance Due   Topic Date Due    COVID-19 Vaccine (1) Never done    DTaP/Tdap/Td vaccine (1 - Tdap) Never done    Colorectal Cancer Screen  Never done    Lipids  03/09/2023    Flu vaccine (1) Never done       PHQ-9  1/11/2023   Little interest or pleasure in doing things 0   Little interest or pleasure in doing things -   Feeling down, depressed, or hopeless 1   PHQ-2 Score 1   Total Score PHQ 2 -   PHQ-9 Total Score 1
Normal appearance. She is obese. Cardiovascular:      Rate and Rhythm: Normal rate. Heart sounds: Murmur heard. No friction rub. No gallop. Pulmonary:      Effort: Pulmonary effort is normal.      Breath sounds: Normal breath sounds. Neurological:      Mental Status: She is alert. Psychiatric:         Behavior: Behavior normal.         Thought Content:  Thought content normal.

## 2023-08-08 ENCOUNTER — TELEPHONE (OUTPATIENT)
Age: 43
End: 2023-08-08

## 2023-08-08 DIAGNOSIS — I50.9 HEART FAILURE, UNSPECIFIED (HCC): ICD-10-CM

## 2023-08-08 DIAGNOSIS — E78.2 MIXED HYPERLIPIDEMIA: ICD-10-CM

## 2023-08-08 DIAGNOSIS — R60.0 LOCALIZED EDEMA: ICD-10-CM

## 2023-08-08 NOTE — TELEPHONE ENCOUNTER
Faxed device management form to [facility] Boateng at fax number [phone]. Fax confirmation received.

## 2023-08-09 RX ORDER — ATORVASTATIN CALCIUM 20 MG/1
TABLET, FILM COATED ORAL
Qty: 30 TABLET | Refills: 0 | Status: SHIPPED | OUTPATIENT
Start: 2023-08-09 | End: 2023-09-05

## 2023-08-09 RX ORDER — TORSEMIDE 20 MG/1
TABLET ORAL
Qty: 180 TABLET | Refills: 1 | Status: SHIPPED | OUTPATIENT
Start: 2023-08-09

## 2023-08-09 RX ORDER — PANTOPRAZOLE SODIUM 20 MG/1
TABLET, DELAYED RELEASE ORAL
Qty: 30 TABLET | Refills: 0 | Status: SHIPPED | OUTPATIENT
Start: 2023-08-09 | End: 2023-09-05

## 2023-08-09 NOTE — TELEPHONE ENCOUNTER
Requested Prescriptions     Signed Prescriptions Disp Refills    torsemide (DEMADEX) 20 MG tablet 180 tablet 1     Sig: TAKE 2 TABLETS BY MOUTH DAILY     Authorizing Provider: Davis Gutiérrez     Ordering User:  Júnior Valladares per MD    Future Appointments   Date Time Provider [address]   8/31/2023 11:20 AM [facility] RLMAMMO [place] Dx   9/13/2023 10:00 AM LAB ONLY PAFP BS AMB   9/18/2023 11:20 AM MD ASHISH Neff BS AMB   11/14/2023  9:00 AM [id number] BS AMB   11/14/2023  9:20 AM MD ASHISH Toribio BS AMB   2/2/2024 10:45 AM Rojelio Monique APRN - NP PAFRIC BS AMB

## 2023-08-09 NOTE — TELEPHONE ENCOUNTER
PCP: Laverne Hendrix, APRN - NP    Last appt: 8/2/2023       Future Appointments   Date Time Provider [address]   8/31/2023 11:20 AM [address] [place] [address] 1 [facility] RLMAMMO [place] Dx   9/13/2023 10:00 AM LAB ONLY KANNAN BS AMB   9/18/2023 11:20 AM MD ASHISH Aguilar BS AMB   11/14/2023  9:00 AM [id number] BS AMB   11/14/2023  9:20 AM MD ASHISH Issa BS AMB   2/2/2024 10:45 AM Nimco Monique APRN - NP PAFP BS AMB       Requested Prescriptions     Pending Prescriptions Disp Refills    atorvastatin (LIPITOR) 20 MG tablet [Pharmacy Med Name: ATORVASTATIN 20 MG TABLET] 90 tablet 1     Sig: TAKE 1 TABLET BY MOUTH EVERY DAY    pantoprazole (PROTONIX) 20 MG tablet [Pharmacy Med Name: PANTOPRAZOLE SOD DR 20 MG TAB] 90 tablet 1     Sig: TAKE 1 TABLET BY MOUTH EVERY DAY       Prior labs and Blood pressures:  BP Readings from Last 3 Encounters:   08/02/23 92/65   03/16/23 125/73   01/11/23 105/70     Lab Results   Component Value Date/Time     11/10/2022 12:38 PM    K 3.2 11/10/2022 12:38 PM     11/10/2022 12:38 PM    CO2 29 11/10/2022 12:38 PM    BUN 11 11/10/2022 12:38 PM    GFRAA >60 03/09/2022 10:45 AM     Lab Results   Component Value Date/Time    NYA2XKZQ 6.4 10/06/2022 02:00 PM     Lab Results   Component Value Date/Time    CHOL 131 03/09/2022 10:45 AM    HDL 41 03/09/2022 10:45 AM    HDLPOC 23 09/27/2019 10:31 AM     No results found for: VITD3, VD3RIA    Lab Results   Component Value Date/Time    TSH 3.52 03/09/2022 10:45 AM

## 2023-08-31 ENCOUNTER — HOSPITAL ENCOUNTER (OUTPATIENT)
Facility: HOSPITAL | Age: 43
Discharge: HOME | End: 2023-08-31
Payer: MEDICARE

## 2023-08-31 VITALS — BODY MASS INDEX: 42.74 KG/M2 | WEIGHT: 282 LBS | HEIGHT: 68 IN

## 2023-08-31 DIAGNOSIS — Z12.31 VISIT FOR SCREENING MAMMOGRAM: ICD-10-CM

## 2023-08-31 PROCEDURE — 77063 BREAST TOMOSYNTHESIS BI: CPT

## 2023-09-01 DIAGNOSIS — E78.2 MIXED HYPERLIPIDEMIA: ICD-10-CM

## 2023-09-01 DIAGNOSIS — I50.9 HEART FAILURE, UNSPECIFIED (HCC): ICD-10-CM

## 2023-09-03 DIAGNOSIS — I50.9 HEART FAILURE, UNSPECIFIED (HCC): ICD-10-CM

## 2023-09-05 RX ORDER — PANTOPRAZOLE SODIUM 20 MG/1
TABLET, DELAYED RELEASE ORAL
Qty: 90 TABLET | Refills: 1 | Status: SHIPPED | OUTPATIENT
Start: 2023-09-05

## 2023-09-05 RX ORDER — ATORVASTATIN CALCIUM 20 MG/1
TABLET, FILM COATED ORAL
Qty: 90 TABLET | Refills: 1 | Status: SHIPPED | OUTPATIENT
Start: 2023-09-05

## 2023-09-05 RX ORDER — SPIRONOLACTONE 50 MG/1
TABLET, FILM COATED ORAL
Qty: 180 TABLET | Refills: 1 | Status: SHIPPED | OUTPATIENT
Start: 2023-09-05

## 2023-09-05 NOTE — TELEPHONE ENCOUNTER
Requested Prescriptions     Signed Prescriptions Disp Refills    spironolactone (ALDACTONE) 50 MG tablet 180 tablet 1     Sig: TAKE 1 TABLET BY MOUTH TWO TIMES A DAY. Authorizing Provider: Alpa Walters     Ordering User:  Marcelo Phlegm per MD    Future Appointments   Date Time Provider [address]   9/13/2023 10:00 AM LAB ONLY KANNAN BS AMB   9/18/2023 11:20 AM MD ASHISH Monterroso AMB   11/14/2023  9:00 AM DAVINA MILIAN AMB   11/14/2023  9:20 AM MD ASHISH Watts AMB   2/2/2024 10:45 AM Thiago Monique, APRN - NP KANNAN BS AMB

## 2023-09-13 ENCOUNTER — NURSE ONLY (OUTPATIENT)
Age: 43
End: 2023-09-13

## 2023-09-13 DIAGNOSIS — E03.9 ACQUIRED HYPOTHYROIDISM: ICD-10-CM

## 2023-09-13 DIAGNOSIS — E03.9 ACQUIRED HYPOTHYROIDISM: Primary | ICD-10-CM

## 2023-09-13 LAB
T4 FREE SERPL-MCNC: 1.1 NG/DL (ref 0.8–1.5)
TSH SERPL DL<=0.05 MIU/L-ACNC: 4.03 UIU/ML (ref 0.36–3.74)

## 2023-09-13 RX ORDER — LEVOTHYROXINE SODIUM 0.1 MG/1
100 TABLET ORAL DAILY
Qty: 30 TABLET | Refills: 3 | Status: SHIPPED | OUTPATIENT
Start: 2023-09-13

## 2023-09-18 ENCOUNTER — OFFICE VISIT (OUTPATIENT)
Age: 43
End: 2023-09-18
Payer: MEDICARE

## 2023-09-18 VITALS
SYSTOLIC BLOOD PRESSURE: 98 MMHG | DIASTOLIC BLOOD PRESSURE: 62 MMHG | HEIGHT: 68 IN | HEART RATE: 78 BPM | BODY MASS INDEX: 42.77 KG/M2 | WEIGHT: 282.2 LBS | OXYGEN SATURATION: 100 %

## 2023-09-18 DIAGNOSIS — I42.8 NON-ISCHEMIC CARDIOMYOPATHY (HCC): ICD-10-CM

## 2023-09-18 DIAGNOSIS — Z95.810 PRESENCE OF AUTOMATIC (IMPLANTABLE) CARDIAC DEFIBRILLATOR: ICD-10-CM

## 2023-09-18 DIAGNOSIS — Z72.0 TOBACCO USE: ICD-10-CM

## 2023-09-18 DIAGNOSIS — R60.0 BILATERAL LOWER EXTREMITY EDEMA: Primary | ICD-10-CM

## 2023-09-18 PROCEDURE — 93005 ELECTROCARDIOGRAM TRACING: CPT | Performed by: INTERNAL MEDICINE

## 2023-09-18 PROCEDURE — 93010 ELECTROCARDIOGRAM REPORT: CPT | Performed by: INTERNAL MEDICINE

## 2023-09-18 PROCEDURE — 99214 OFFICE O/P EST MOD 30 MIN: CPT | Performed by: INTERNAL MEDICINE

## 2023-09-18 ASSESSMENT — PATIENT HEALTH QUESTIONNAIRE - PHQ9
SUM OF ALL RESPONSES TO PHQ QUESTIONS 1-9: 0
1. LITTLE INTEREST OR PLEASURE IN DOING THINGS: 0
2. FEELING DOWN, DEPRESSED OR HOPELESS: 0
SUM OF ALL RESPONSES TO PHQ9 QUESTIONS 1 & 2: 0
SUM OF ALL RESPONSES TO PHQ QUESTIONS 1-9: 0

## 2023-09-18 NOTE — PATIENT INSTRUCTIONS
Please get an echo within the week. Please take 40 mg of torsemide in the am and 20 mg of torsemide in the pm for 1 week. Follow up with [address] [facility] with their compression specialists for a pair of compression socks.  [phone]

## 2023-09-20 ENCOUNTER — ANCILLARY PROCEDURE (OUTPATIENT)
Age: 43
End: 2023-09-20
Payer: MEDICARE

## 2023-09-20 VITALS
SYSTOLIC BLOOD PRESSURE: 98 MMHG | WEIGHT: 282 LBS | HEIGHT: 68 IN | BODY MASS INDEX: 42.74 KG/M2 | DIASTOLIC BLOOD PRESSURE: 62 MMHG

## 2023-09-20 DIAGNOSIS — R60.0 BILATERAL LOWER EXTREMITY EDEMA: ICD-10-CM

## 2023-09-20 DIAGNOSIS — Z95.810 PRESENCE OF AUTOMATIC (IMPLANTABLE) CARDIAC DEFIBRILLATOR: ICD-10-CM

## 2023-09-20 DIAGNOSIS — Z72.0 TOBACCO USE: ICD-10-CM

## 2023-09-20 DIAGNOSIS — I42.8 NON-ISCHEMIC CARDIOMYOPATHY (HCC): ICD-10-CM

## 2023-09-20 LAB
ECHO AO ASC DIAM: 2.7 CM
ECHO AO ASCENDING AORTA INDEX: 1.14 CM/M2
ECHO AO ROOT DIAM: 2.7 CM
ECHO AO ROOT INDEX: 1.14 CM/M2
ECHO AV MEAN GRADIENT: 2 MMHG
ECHO AV MEAN VELOCITY: 0.7 M/S
ECHO AV PEAK GRADIENT: 5 MMHG
ECHO AV PEAK VELOCITY: 1.1 M/S
ECHO AV VELOCITY RATIO: 0.73
ECHO AV VTI: 17.1 CM
ECHO BSA: 2.48 M2
ECHO EST RA PRESSURE: 3 MMHG
ECHO LA DIAMETER INDEX: 1.9 CM/M2
ECHO LA DIAMETER: 4.5 CM
ECHO LA TO AORTIC ROOT RATIO: 1.67
ECHO LA VOL 2C: 39 ML (ref 22–52)
ECHO LA VOL 4C: 82 ML (ref 22–52)
ECHO LA VOLUME AREA LENGTH: 69 ML
ECHO LA VOLUME INDEX A2C: 16 ML/M2 (ref 16–34)
ECHO LA VOLUME INDEX A4C: 35 ML/M2 (ref 16–34)
ECHO LA VOLUME INDEX AREA LENGTH: 29 ML/M2 (ref 16–34)
ECHO LV E' LATERAL VELOCITY: 10 CM/S
ECHO LV E' SEPTAL VELOCITY: 6 CM/S
ECHO LV EDV A2C: 51 ML
ECHO LV EDV A4C: 97 ML
ECHO LV EDV BP: 71 ML (ref 56–104)
ECHO LV EDV INDEX A4C: 41 ML/M2
ECHO LV EDV INDEX BP: 30 ML/M2
ECHO LV EDV NDEX A2C: 22 ML/M2
ECHO LV EJECTION FRACTION A2C: 40 %
ECHO LV EJECTION FRACTION A4C: 58 %
ECHO LV EJECTION FRACTION BIPLANE: 51 % (ref 55–100)
ECHO LV ESV A2C: 30 ML
ECHO LV ESV A4C: 41 ML
ECHO LV ESV BP: 35 ML (ref 19–49)
ECHO LV ESV INDEX A2C: 13 ML/M2
ECHO LV ESV INDEX A4C: 17 ML/M2
ECHO LV ESV INDEX BP: 15 ML/M2
ECHO LV FRACTIONAL SHORTENING: 26 % (ref 28–44)
ECHO LV INTERNAL DIMENSION DIASTOLE INDEX: 2.41 CM/M2
ECHO LV INTERNAL DIMENSION DIASTOLIC: 5.7 CM (ref 3.9–5.3)
ECHO LV INTERNAL DIMENSION SYSTOLIC INDEX: 1.77 CM/M2
ECHO LV INTERNAL DIMENSION SYSTOLIC: 4.2 CM
ECHO LV IVSD: 1.2 CM (ref 0.6–0.9)
ECHO LV MASS 2D: 226.4 G (ref 67–162)
ECHO LV MASS INDEX 2D: 95.5 G/M2 (ref 43–95)
ECHO LV POSTERIOR WALL DIASTOLIC: 0.8 CM (ref 0.6–0.9)
ECHO LV RELATIVE WALL THICKNESS RATIO: 0.28
ECHO LVOT AV VTI INDEX: 0.81
ECHO LVOT MEAN GRADIENT: 1 MMHG
ECHO LVOT PEAK GRADIENT: 3 MMHG
ECHO LVOT PEAK VELOCITY: 0.8 M/S
ECHO LVOT VTI: 13.9 CM
ECHO MV A VELOCITY: 0.9 M/S
ECHO MV AREA PHT: 1.8 CM2
ECHO MV E DECELERATION TIME (DT): 423 MS
ECHO MV E VELOCITY: 0.97 M/S
ECHO MV E/A RATIO: 1.08
ECHO MV E/E' LATERAL: 9.7
ECHO MV E/E' RATIO (AVERAGED): 12.93
ECHO MV E/E' SEPTAL: 16.17
ECHO MV PRESSURE HALF TIME (PHT): 122.7 MS
ECHO RA AREA 4C: 13.5 CM2
ECHO RA END SYSTOLIC VOLUME APICAL 4 CHAMBER INDEX BSA: 13 ML/M2
ECHO RA VOLUME AREA LENGTH APICAL 4 CHAMBER: 32 ML
ECHO RA VOLUME: 30 ML
ECHO RIGHT VENTRICULAR SYSTOLIC PRESSURE (RVSP): 18 MMHG
ECHO RV FREE WALL PEAK S': 7 CM/S
ECHO RV INTERNAL DIMENSION: 3 CM
ECHO RV TAPSE: 1.6 CM (ref 1.7–?)
ECHO TV REGURGITANT MAX VELOCITY: 1.93 M/S
ECHO TV REGURGITANT PEAK GRADIENT: 15 MMHG

## 2023-09-20 PROCEDURE — 93306 TTE W/DOPPLER COMPLETE: CPT

## 2023-09-21 ENCOUNTER — TELEPHONE (OUTPATIENT)
Age: 43
End: 2023-09-21

## 2023-09-21 NOTE — TELEPHONE ENCOUNTER
----- Message from Pedro Ying MD sent at 9/20/2023  9:21 PM EDT -----  Please let pt know echo was overall unchanged. Near normal LV systolic function. Normal MV repair. Keep with plan for short term increased diuretic. Consistent with LE edema being venous insufficiency.  Minimize salt intake, elevate legs at rest. thx

## 2023-10-04 ENCOUNTER — TELEPHONE (OUTPATIENT)
Age: 43
End: 2023-10-04

## 2023-10-04 NOTE — TELEPHONE ENCOUNTER
Records requested from [address] [place] [address] Gastroenterology. Faxed to [phone]. Confirmation received.

## 2023-10-18 ENCOUNTER — OFFICE VISIT (OUTPATIENT)
Age: 43
End: 2023-10-18
Payer: MEDICARE

## 2023-10-18 VITALS
DIASTOLIC BLOOD PRESSURE: 62 MMHG | HEIGHT: 68 IN | OXYGEN SATURATION: 100 % | WEIGHT: 280 LBS | HEART RATE: 62 BPM | BODY MASS INDEX: 42.44 KG/M2 | SYSTOLIC BLOOD PRESSURE: 94 MMHG

## 2023-10-18 DIAGNOSIS — I10 BENIGN ESSENTIAL HTN: ICD-10-CM

## 2023-10-18 DIAGNOSIS — E78.2 MIXED HYPERLIPIDEMIA: ICD-10-CM

## 2023-10-18 DIAGNOSIS — I87.2 VENOUS INSUFFICIENCY: Primary | ICD-10-CM

## 2023-10-18 DIAGNOSIS — I42.8 NON-ISCHEMIC CARDIOMYOPATHY (HCC): ICD-10-CM

## 2023-10-18 PROCEDURE — 99214 OFFICE O/P EST MOD 30 MIN: CPT | Performed by: INTERNAL MEDICINE

## 2023-10-18 RX ORDER — METHOCARBAMOL 750 MG/1
TABLET, FILM COATED ORAL
COMMUNITY
Start: 2023-10-15

## 2023-10-20 RX ORDER — SACUBITRIL AND VALSARTAN 24; 26 MG/1; MG/1
1 TABLET, FILM COATED ORAL 2 TIMES DAILY
Qty: 180 TABLET | Refills: 1 | Status: SHIPPED | OUTPATIENT
Start: 2023-10-20

## 2023-10-20 NOTE — TELEPHONE ENCOUNTER
Requested Prescriptions     Signed Prescriptions Disp Refills    sacubitril-valsartan (ENTRESTO) 24-26 MG per tablet 180 tablet 1     Sig: Take 1 tablet by mouth 2 times daily     Authorizing Provider: Dominique Winston     Ordering User:  Joshua Ying per Dr. Carmen Haywood.     Future Appointments   Date Time Provider [address]   11/14/2023  9:00 AM Fidencio MILIAN BS AMB   11/14/2023  9:20 AM MD ASHISH Sosa BS AMB   2/2/2024 10:45 AM Flora Monique APRN - FIDE BRUNNER BS AMB   4/18/2024 10:20 AM MD ASHISH Waters BS AMB

## 2023-11-13 NOTE — TELEPHONE ENCOUNTER
PCP: Rabia Monique APRN - NP    Last appt: 8/2/2023   Future Appointments   Date Time Provider [address]   11/14/2023  9:00 AM PACEMAKER3, Maciej Hudsonbrianna BS AMB   11/14/2023  9:20 AM MD ASHISH Morgan AMB   2/2/2024 10:45 AM Rabia Monique APRN - NP PAFP BS AMB   4/18/2024 10:20 AM MD ASHISH Avila BS AMB   11/26/2024  3:40 PM PACEMAKER3, DAVINA ADEN AMB   11/26/2024  4:00 PM MD ASHISH Morgan BS AMB       Requested Prescriptions     Pending Prescriptions Disp Refills    albuterol sulfate HFA (PROVENTIL;VENTOLIN;PROAIR) 108 (90 Base) MCG/ACT inhaler [Pharmacy Med Name: ALBUTEROL HFA (PROAIR) INHALER] 8.5 each 4     Sig: INHALE 2 PUFFS BY MOUTH EVERY 4 HOURS AS NEEDED FOR WHEEZING OR SHORTNESS OF BREATH         Prior labs and Blood pressures:  BP Readings from Last 3 Encounters:   10/18/23 94/62   09/20/23 98/62   09/18/23 98/62     Lab Results   Component Value Date/Time     11/10/2022 12:38 PM    K 3.2 11/10/2022 12:38 PM     11/10/2022 12:38 PM    CO2 29 11/10/2022 12:38 PM    BUN 11 11/10/2022 12:38 PM    GFRAA >60 03/09/2022 10:45 AM     Lab Results   Component Value Date/Time    DEL0UGWP 6.4 10/06/2022 02:00 PM     Lab Results   Component Value Date/Time    CHOL 131 03/09/2022 10:45 AM    HDL 41 03/09/2022 10:45 AM    HDLPOC 23 09/27/2019 10:31 AM     No results found for: \"VITD3\", \"VD3RIA\"    Lab Results   Component Value Date/Time    TSH 3.52 03/09/2022 10:45 AM

## 2023-11-14 ENCOUNTER — PROCEDURE VISIT (OUTPATIENT)
Age: 43
End: 2023-11-14

## 2023-11-14 ENCOUNTER — OFFICE VISIT (OUTPATIENT)
Age: 43
End: 2023-11-14
Payer: MEDICARE

## 2023-11-14 VITALS
HEART RATE: 64 BPM | WEIGHT: 280 LBS | BODY MASS INDEX: 42.44 KG/M2 | DIASTOLIC BLOOD PRESSURE: 70 MMHG | OXYGEN SATURATION: 98 % | SYSTOLIC BLOOD PRESSURE: 106 MMHG | HEIGHT: 68 IN

## 2023-11-14 DIAGNOSIS — Z95.810 CARDIAC DEFIBRILLATOR IN SITU: Primary | ICD-10-CM

## 2023-11-14 DIAGNOSIS — I34.0 NONRHEUMATIC MITRAL (VALVE) INSUFFICIENCY: ICD-10-CM

## 2023-11-14 DIAGNOSIS — Z98.890 HISTORY OF MITRAL VALVE REPAIR: ICD-10-CM

## 2023-11-14 DIAGNOSIS — E66.01 MORBID (SEVERE) OBESITY DUE TO EXCESS CALORIES (HCC): ICD-10-CM

## 2023-11-14 DIAGNOSIS — I10 ESSENTIAL (PRIMARY) HYPERTENSION: ICD-10-CM

## 2023-11-14 DIAGNOSIS — I42.8 NON-ISCHEMIC CARDIOMYOPATHY (HCC): ICD-10-CM

## 2023-11-14 PROCEDURE — 99204 OFFICE O/P NEW MOD 45 MIN: CPT | Performed by: INTERNAL MEDICINE

## 2023-11-14 PROCEDURE — G8427 DOCREV CUR MEDS BY ELIG CLIN: HCPCS | Performed by: INTERNAL MEDICINE

## 2023-11-14 PROCEDURE — 3078F DIAST BP <80 MM HG: CPT | Performed by: INTERNAL MEDICINE

## 2023-11-14 PROCEDURE — 4004F PT TOBACCO SCREEN RCVD TLK: CPT | Performed by: INTERNAL MEDICINE

## 2023-11-14 PROCEDURE — G8417 CALC BMI ABV UP PARAM F/U: HCPCS | Performed by: INTERNAL MEDICINE

## 2023-11-14 PROCEDURE — 3074F SYST BP LT 130 MM HG: CPT | Performed by: INTERNAL MEDICINE

## 2023-11-14 PROCEDURE — G8484 FLU IMMUNIZE NO ADMIN: HCPCS | Performed by: INTERNAL MEDICINE

## 2023-11-14 RX ORDER — ALBUTEROL SULFATE 90 UG/1
AEROSOL, METERED RESPIRATORY (INHALATION)
Qty: 8.5 EACH | Refills: 4 | Status: SHIPPED | OUTPATIENT
Start: 2023-11-14

## 2023-11-14 ASSESSMENT — PATIENT HEALTH QUESTIONNAIRE - PHQ9
SUM OF ALL RESPONSES TO PHQ QUESTIONS 1-9: 0
2. FEELING DOWN, DEPRESSED OR HOPELESS: 0
SUM OF ALL RESPONSES TO PHQ9 QUESTIONS 1 & 2: 0
SUM OF ALL RESPONSES TO PHQ QUESTIONS 1-9: 0
1. LITTLE INTEREST OR PLEASURE IN DOING THINGS: 0

## 2023-11-14 NOTE — PROGRESS NOTES
Cardiac Electrophysiology Office Note    She is referred from Dr Poncho Azul for management of ICD    History of Present Illness:  Ms. Lilibeth Pineda is a 37 y.o. F with h/o non ischemic cardiomyopathy EF 55% (as low as 13% in the past), cath 2018 and 2019 with no significant CAD; mild LAD/RCA plaquing, severe MR s/p MV repair 2018, status post Medtronic ICD in 01/2021, HTN, mixed hyperlipidemia, h/o hyperthyroid, Grave's disease (treated with radiation isotope therapy in 2019). Cardiac MRI in 01/2018 with ejection fraction of 40% and findings of dilated nonischemic cardiomyopathy. Right heart catheterization in 2018, 2019 and 2020. In 08/2018 was status post mitral valve repair with just mild to moderate mitral regurgitation thereafter. She is improved and has no complaint of chest pain   No LESLIE      Assessment and Plan:      Diagnosis Orders   1. Cardiac defibrillator in situ        2. Non-ischemic cardiomyopathy ([address])        3. Essential (primary) hypertension        4. Morbid (severe) obesity due to excess calories ([address])        5. Nonrheumatic mitral (valve) insufficiency        6. History of mitral valve repair            1. AICD single chamber Medtronic  9 year battery  4 second NSVT  She has improved LVEF to 51%     2. Nonischemic cardiomyopathy, EF of 50-55%. We discussed the echocardiogram results, as noted above. She is stable and compensated and we will continue her current cardiac GDMT regimen. 3. Type 2 diabetes. Followed by endocrine and this has been under better control. 4. Essential hypertension. Blood pressure is controlled. Not dizzy  5. Tobacco use. Encouraged cessation. 6. Morbid obesity- need to lose weight  7.  Mvr- mild MR on echo    Future Appointments   Date Time Provider [address]   2/2/2024 10:45 AM Josephine Monique APRN - NP PAFP BS AMB   4/18/2024 10:20 AM MD ASHISH Bender AMB   11/26/2024  3:40 PM PACEMAKER3, DAVINA ADEN AMB   11/26/2024  4:00 PM Peres,

## 2023-11-17 ENCOUNTER — HOSPITAL ENCOUNTER (OUTPATIENT)
Facility: HOSPITAL | Age: 43
Setting detail: OUTPATIENT SURGERY
Discharge: HOME | End: 2023-11-17
Attending: INTERNAL MEDICINE | Admitting: INTERNAL MEDICINE
Payer: MEDICARE

## 2023-11-17 ENCOUNTER — ANESTHESIA EVENT (OUTPATIENT)
Facility: HOSPITAL | Age: 43
End: 2023-11-17
Payer: MEDICARE

## 2023-11-17 ENCOUNTER — ANESTHESIA (OUTPATIENT)
Facility: HOSPITAL | Age: 43
End: 2023-11-17
Payer: MEDICARE

## 2023-11-17 VITALS
OXYGEN SATURATION: 100 % | SYSTOLIC BLOOD PRESSURE: 118 MMHG | RESPIRATION RATE: 19 BRPM | DIASTOLIC BLOOD PRESSURE: 85 MMHG | HEIGHT: 68 IN | HEART RATE: 76 BPM | WEIGHT: 284 LBS | TEMPERATURE: 98 F | BODY MASS INDEX: 43.04 KG/M2

## 2023-11-17 PROCEDURE — 7100000010 HC PHASE II RECOVERY - FIRST 15 MIN: Performed by: INTERNAL MEDICINE

## 2023-11-17 PROCEDURE — 2580000003 HC RX 258: Performed by: INTERNAL MEDICINE

## 2023-11-17 PROCEDURE — 2500000003 HC RX 250 WO HCPCS: Performed by: ANESTHESIOLOGY

## 2023-11-17 PROCEDURE — 88305 TISSUE EXAM BY PATHOLOGIST: CPT

## 2023-11-17 PROCEDURE — 3600007502: Performed by: INTERNAL MEDICINE

## 2023-11-17 PROCEDURE — 6360000002 HC RX W HCPCS: Performed by: ANESTHESIOLOGY

## 2023-11-17 PROCEDURE — 2709999900 HC NON-CHARGEABLE SUPPLY: Performed by: INTERNAL MEDICINE

## 2023-11-17 PROCEDURE — 6370000000 HC RX 637 (ALT 250 FOR IP): Performed by: INTERNAL MEDICINE

## 2023-11-17 PROCEDURE — 3600007512: Performed by: INTERNAL MEDICINE

## 2023-11-17 PROCEDURE — 3700000000 HC ANESTHESIA ATTENDED CARE: Performed by: INTERNAL MEDICINE

## 2023-11-17 PROCEDURE — 3700000001 HC ADD 15 MINUTES (ANESTHESIA): Performed by: INTERNAL MEDICINE

## 2023-11-17 RX ORDER — SODIUM CHLORIDE 0.9 % (FLUSH) 0.9 %
5-40 SYRINGE (ML) INJECTION EVERY 12 HOURS SCHEDULED
Status: DISCONTINUED | OUTPATIENT
Start: 2023-11-17 | End: 2023-11-17 | Stop reason: HOSPADM

## 2023-11-17 RX ORDER — SODIUM CHLORIDE 0.9 % (FLUSH) 0.9 %
5-40 SYRINGE (ML) INJECTION PRN
Status: DISCONTINUED | OUTPATIENT
Start: 2023-11-17 | End: 2023-11-17 | Stop reason: HOSPADM

## 2023-11-17 RX ORDER — GLYCOPYRROLATE 0.2 MG/ML
INJECTION INTRAMUSCULAR; INTRAVENOUS PRN
Status: DISCONTINUED | OUTPATIENT
Start: 2023-11-17 | End: 2023-11-17 | Stop reason: SDUPTHER

## 2023-11-17 RX ORDER — LIDOCAINE HYDROCHLORIDE 20 MG/ML
INJECTION, SOLUTION EPIDURAL; INFILTRATION; INTRACAUDAL; PERINEURAL PRN
Status: DISCONTINUED | OUTPATIENT
Start: 2023-11-17 | End: 2023-11-17 | Stop reason: SDUPTHER

## 2023-11-17 RX ORDER — SIMETHICONE 20 MG/.3ML
EMULSION ORAL PRN
Status: DISCONTINUED | OUTPATIENT
Start: 2023-11-17 | End: 2023-11-17 | Stop reason: ALTCHOICE

## 2023-11-17 RX ORDER — SODIUM CHLORIDE 9 MG/ML
25 INJECTION, SOLUTION INTRAVENOUS PRN
Status: DISCONTINUED | OUTPATIENT
Start: 2023-11-17 | End: 2023-11-17 | Stop reason: HOSPADM

## 2023-11-17 RX ADMIN — GLYCOPYRROLATE 0.2 MG: 0.2 INJECTION, SOLUTION INTRAMUSCULAR; INTRAVENOUS at 08:33

## 2023-11-17 RX ADMIN — LIDOCAINE HYDROCHLORIDE 100 MG: 20 INJECTION, SOLUTION EPIDURAL; INFILTRATION; INTRACAUDAL; PERINEURAL at 08:33

## 2023-11-17 RX ADMIN — PROPOFOL 480 MG: 10 INJECTION, EMULSION INTRAVENOUS at 09:09

## 2023-11-17 RX ADMIN — SODIUM CHLORIDE 25 ML: 9 INJECTION, SOLUTION INTRAVENOUS at 08:04

## 2023-11-17 ASSESSMENT — PAIN - FUNCTIONAL ASSESSMENT: PAIN_FUNCTIONAL_ASSESSMENT: 0-10

## 2023-11-17 NOTE — PROGRESS NOTES
Endoscopy Case End Note:    0908:  Procedure scope was pre-cleaned, per protocol, at bedside by RHODA Ro.      0908:  Report received from anesthesia - Dr Wu Mccarthy. See anesthesia flowsheet for intra-procedure vital signs and events.

## 2023-11-17 NOTE — PROGRESS NOTES
ARRIVAL INFORMATION:  Verified patient name and date of birth, scheduled procedure, and informed consent. : Sophia Dunn () contact number: [phone]  Physician and staff can share information with the . Belongings with patient include:  Clothing,Jewelry (1 ring, 1 watch on patient)    GI FOCUSED ASSESSMENT:  Neuro: Awake, alert, oriented x4  Respiratory: even and unlabored   GI: soft and non-distended  EKG Rhythm: normal sinus rhythm    Education:Reviewed general discharge instructions and  information. The risks and benefits of the bite block have been explained to patient. Patient verbalizes understanding.

## 2023-11-17 NOTE — OP NOTE
Colonoscopy and EGD Procedure Note      Indications:   chronic GERD with ongoing regurgitation no vomiting, bloating, dyspepsia, screening colonoscopy      : Zari Alejandre MD    Staff: Circulator: Sarah Mckinley RN  Endoscopy Technician: Marlene Cuadra; Addison Austin    Referring Provider: ELLIOTT Hardy NP    Sedation:  MAC anesthesia Propofol    Procedure Details:  After informed consent was obtained with all risks and benefits of procedure explained and pre-operative exam completed, pt was placed in the left lateral decubitus position. Following sequential administration of sedation as per above, the gastroscope was inserted into the mouth and advanced under direct vision to second portion of the duodenum. A careful inspection was made as the gastroscope was withdrawn, including a retroflexed view of the proximal stomach; findings and interventions are described below. EGD Findings:  Esophagus:Normal esophageal mucosa biopsied cold forceps distal and proximal esophagus separately with minimal bleeding to r/o eosinophilic esophagitis vs GERD. UES at 16cm without appreciable stricture. EGJ at 40cm and regular without stricture. Gastric retroflexion with intact GEFV. Stomach: normal stomach, biopsied cold forceps with minimal bleeding antrum, incisura, body, cardia, to r/o H pylori. Duodenum/jejunum: Few small punctate white dots on villi concerning for benign lymphangiectasia, biopsied cold forceps to r/o celiac and lymphangiectasia    The bed was then turned and upon sequential sedation as per above, a digital rectal exam was performed per below. The Olympus videocolonoscope was inserted in the rectum and carefully advanced to the cecum, which was identified by the ileocecal valve and appendiceal orifice. The quality of preparation was  good BPS 2/2/2 6 . The colonoscope was slowly withdrawn with careful evaluation between folds.  Retroflexion in the rectum was

## 2023-11-17 NOTE — ANESTHESIA POSTPROCEDURE EVALUATION
Department of Anesthesiology  Postprocedure Note    Patient: Reddy Caal  MRN: [id number]  YOB: 1980  Date of evaluation: 11/17/2023      Procedure Summary     Date: 11/17/23 Room / Location: [facility] ENDO 03 / [facility] ENDOSCOPY    Anesthesia Start: 0829 Anesthesia Stop: 0914    Procedures:       COLORECTAL CANCER SCREENING, NOT HIGH RISK (Lower GI Region)      EGD BIOPSY (Upper GI Region)      COLONOSCOPY POLYPECTOMY SNARE/COLD BIOPSY Diagnosis:       Colon cancer screening      Long-term current use of proton pump inhibitor therapy      GERD (gastroesophageal reflux disease)      Bloating      Dyspepsia      Colon polyps      Colon, diverticulosis      Melanosis coli      (Colon cancer screening [Z12.11])      (Long-term current use of proton pump inhibitor therapy [Z79.899])    Surgeons: Lisa Bullock MD Responsible Provider: Harrell Boast, DO    Anesthesia Type: TIVA ASA Status: 3          Anesthesia Type: TIVA    Radha Phase I: Radha Score: 10    Radha Phase II: Radha Score: 10      Anesthesia Post Evaluation    Patient location during evaluation: PACU  Patient participation: complete - patient participated  Level of consciousness: awake  Airway patency: patent  Nausea & Vomiting: no vomiting and no nausea  Complications: no  Cardiovascular status: hemodynamically stable  Respiratory status: acceptable  Hydration status: euvolemic

## 2023-11-17 NOTE — ANESTHESIA PRE PROCEDURE
Department of Anesthesiology  Preprocedure Note       Name:  Stanley Hess   Age:  37 y.o.  :  1980                                          MRN:  [id number]         Date:  2023      Surgeon: Delfino Soria):  Ruben Cantu MD    Procedure: Procedure(s):  COLONOSCOPY DIAGNOSTIC  EGD BIOPSY    Medications prior to admission:   Prior to Admission medications    Medication Sig Start Date End Date Taking? Authorizing Provider   albuterol sulfate HFA (PROVENTIL;VENTOLIN;PROAIR) 108 (90 Base) MCG/ACT inhaler INHALE 2 PUFFS BY MOUTH EVERY 4 HOURS AS NEEDED FOR WHEEZING OR SHORTNESS OF BREATH 23   Pretty Monique APRN - NP   sacubitril-valsartan (ENTRESTO) 24-26 MG per tablet Take 1 tablet by mouth 2 times daily 10/20/23   Yumi Hope MD   methocarbamol (ROBAXIN) 750 MG tablet  10/15/23   Sheila Rivera MD   levothyroxine (SYNTHROID) 100 MCG tablet Take 1 tablet by mouth daily 23   Pretty Monique APRN - NP   atorvastatin (LIPITOR) 20 MG tablet TAKE 1 TABLET BY MOUTH EVERY DAY 23   Pretty Monique APRN - NP   pantoprazole (PROTONIX) 20 MG tablet TAKE 1 TABLET BY MOUTH EVERY DAY 23   Pretty Monique APRN - NP   spironolactone (ALDACTONE) 50 MG tablet TAKE 1 TABLET BY MOUTH TWO TIMES A DAY. 23   Yumi Hope MD   torsemide (DEMADEX) 20 MG tablet TAKE 2 TABLETS BY MOUTH DAILY 23   Yumi Hope MD   Blood Glucose Monitoring Suppl (ASSURE PRO BLOOD GLUCOSE METER) LENA Relion Brand. Test blood sugar twice daily. DX: E11.9. Substitute supply brand accepted by insurance. 22   Sheila Rivera MD   blood glucose test strips (ASCENSIA AUTODISC VI;ONE TOUCH ULTRA TEST VI) strip Test blood sugar twice daily. DX: E11.9. Substitute supply brand accepted by insurance.  22   Sheila Rivera MD   amoxicillin (AMOXIL) 500 MG capsule TAKE 4 CAPSULES BY MOUTH 30 MINUTES PRIOR TO DENTAL WORK 22   Automatic Reconciliation, Ar   Sennosides 8.6 MG CAPS

## 2023-11-27 ENCOUNTER — TELEPHONE (OUTPATIENT)
Age: 43
End: 2023-11-27

## 2023-11-27 ENCOUNTER — COMMUNITY OUTREACH (OUTPATIENT)
Age: 43
End: 2023-11-27

## 2023-11-27 NOTE — TELEPHONE ENCOUNTER
Pt is calling because she needs some forms filled out for [employer] and she would like to know when she can come bye to do this.     [phone]

## 2023-11-27 NOTE — TELEPHONE ENCOUNTER
Telephone call made to patient. Two patient identifiers verified. Patient is going to drop off her portion of the paperwork and fax her personal info herself. Will complete the form and fax when received.

## 2023-11-28 RX ORDER — SACUBITRIL AND VALSARTAN 24; 26 MG/1; MG/1
1 TABLET, FILM COATED ORAL 2 TIMES DAILY
Qty: 180 TABLET | Refills: 3 | Status: SHIPPED | OUTPATIENT
Start: 2023-11-28

## 2023-11-28 NOTE — TELEPHONE ENCOUNTER
Faxed over provider portion of for [facility] patient assistance to [facility]. Received confirmation fax receipt.

## 2024-01-18 ENCOUNTER — OFFICE VISIT (OUTPATIENT)
Age: 44
End: 2024-01-18
Payer: MEDICARE

## 2024-01-18 VITALS
HEART RATE: 88 BPM | WEIGHT: 260.2 LBS | OXYGEN SATURATION: 98 % | RESPIRATION RATE: 18 BRPM | SYSTOLIC BLOOD PRESSURE: 101 MMHG | DIASTOLIC BLOOD PRESSURE: 71 MMHG | BODY MASS INDEX: 39.43 KG/M2 | HEIGHT: 68 IN | TEMPERATURE: 97.9 F

## 2024-01-18 DIAGNOSIS — R11.0 NAUSEA: ICD-10-CM

## 2024-01-18 DIAGNOSIS — R52 BODY ACHES: ICD-10-CM

## 2024-01-18 DIAGNOSIS — U07.1 COVID-19: Primary | ICD-10-CM

## 2024-01-18 LAB
INFLUENZA A ANTIGEN, POC: NEGATIVE
INFLUENZA B ANTIGEN, POC: NEGATIVE
LOT EXPIRE DATE: ABNORMAL
LOT KIT NUMBER: ABNORMAL
SARS-COV-2, POC: DETECTED
VALID INTERNAL CONTROL, POC: YES
VALID INTERNAL CONTROL: YES
VENDOR AND KIT NAME POC: ABNORMAL

## 2024-01-18 PROCEDURE — 87502 INFLUENZA DNA AMP PROBE: CPT | Performed by: NURSE PRACTITIONER

## 2024-01-18 PROCEDURE — PBSHW AMB POC SARS-COV-2: Performed by: NURSE PRACTITIONER

## 2024-01-18 PROCEDURE — G8427 DOCREV CUR MEDS BY ELIG CLIN: HCPCS | Performed by: NURSE PRACTITIONER

## 2024-01-18 PROCEDURE — PBSHW AMB POC INFLUENZA A  AND B REAL-TIME RT-PCR: Performed by: NURSE PRACTITIONER

## 2024-01-18 PROCEDURE — G8417 CALC BMI ABV UP PARAM F/U: HCPCS | Performed by: NURSE PRACTITIONER

## 2024-01-18 PROCEDURE — 4004F PT TOBACCO SCREEN RCVD TLK: CPT | Performed by: NURSE PRACTITIONER

## 2024-01-18 PROCEDURE — G8484 FLU IMMUNIZE NO ADMIN: HCPCS | Performed by: NURSE PRACTITIONER

## 2024-01-18 PROCEDURE — 87426 SARSCOV CORONAVIRUS AG IA: CPT | Performed by: NURSE PRACTITIONER

## 2024-01-18 PROCEDURE — 99213 OFFICE O/P EST LOW 20 MIN: CPT | Performed by: NURSE PRACTITIONER

## 2024-01-18 RX ORDER — ONDANSETRON 4 MG/1
4 TABLET, ORALLY DISINTEGRATING ORAL 3 TIMES DAILY PRN
Qty: 21 TABLET | Refills: 0 | Status: SHIPPED | OUTPATIENT
Start: 2024-01-18

## 2024-01-18 ASSESSMENT — PATIENT HEALTH QUESTIONNAIRE - PHQ9
2. FEELING DOWN, DEPRESSED OR HOPELESS: 0
10. IF YOU CHECKED OFF ANY PROBLEMS, HOW DIFFICULT HAVE THESE PROBLEMS MADE IT FOR YOU TO DO YOUR WORK, TAKE CARE OF THINGS AT HOME, OR GET ALONG WITH OTHER PEOPLE: 0
6. FEELING BAD ABOUT YOURSELF - OR THAT YOU ARE A FAILURE OR HAVE LET YOURSELF OR YOUR FAMILY DOWN: 0
9. THOUGHTS THAT YOU WOULD BE BETTER OFF DEAD, OR OF HURTING YOURSELF: 0
SUM OF ALL RESPONSES TO PHQ9 QUESTIONS 1 & 2: 0
5. POOR APPETITE OR OVEREATING: 0
4. FEELING TIRED OR HAVING LITTLE ENERGY: 3
SUM OF ALL RESPONSES TO PHQ QUESTIONS 1-9: 6
SUM OF ALL RESPONSES TO PHQ QUESTIONS 1-9: 6
8. MOVING OR SPEAKING SO SLOWLY THAT OTHER PEOPLE COULD HAVE NOTICED. OR THE OPPOSITE, BEING SO FIGETY OR RESTLESS THAT YOU HAVE BEEN MOVING AROUND A LOT MORE THAN USUAL: 0
SUM OF ALL RESPONSES TO PHQ QUESTIONS 1-9: 6
1. LITTLE INTEREST OR PLEASURE IN DOING THINGS: 0
3. TROUBLE FALLING OR STAYING ASLEEP: 3
SUM OF ALL RESPONSES TO PHQ QUESTIONS 1-9: 6
7. TROUBLE CONCENTRATING ON THINGS, SUCH AS READING THE NEWSPAPER OR WATCHING TELEVISION: 0

## 2024-01-18 ASSESSMENT — ENCOUNTER SYMPTOMS
NAUSEA: 1
SHORTNESS OF BREATH: 0
VOMITING: 0
WHEEZING: 0
COUGH: 0
SINUS PAIN: 0
SORE THROAT: 1

## 2024-01-18 NOTE — PROGRESS NOTES
Chief Complaint   Patient presents with    Pharyngitis    Back Pain     \"Have you been to the ER, urgent care clinic since your last visit?  Hospitalized since your last visit?\"    NO    “Have you seen or consulted any other health care providers outside of [facility] since your last visit?”    NO

## 2024-01-18 NOTE — PROGRESS NOTES
Assessment/Plan:     1. COVID-19  -     nirmatrelvir/ritonavir 300/100 (PAXLOVID, 300/100,) 20 x 150 MG & 10 x 100MG TBPK; Take 3 tablets (two 150 mg nirmatrelvir and one 100 mg ritonavir tablets) by mouth every 12 hours for 5 days., Disp-30 tablet, R-0Normal  2. Body aches  -     AMB POC INFLUENZA A  AND B REAL-TIME RT-PCR  -     AMB POC SARS-COV-2  3. Nausea  -     ondansetron (ZOFRAN-ODT) 4 MG disintegrating tablet; Take 1 tablet by mouth 3 times daily as needed for Nausea or Vomiting, Disp-21 tablet, R-0Normal     Positive for COVID in office today.  She has not taken atorvastatin in 24 hours to may start Paxlovid as directed.  She will hold atorvastatin until she has completed Paxlovid for 48 hours.  Side effects discussed.  If she fails to improve she will consider urgent evaluation.  She is so lethargic in office today due to lack of sleep that I have asked her  to pick her up since she does not appear fit to drive at this time.  She is agreeable.      Return in about 4 weeks (around 2/15/2024) for Follow Up.     Discussed expected course/resolution/complications of diagnosis in detail with patient.    Medication risks/benefits/costs/interactions/alternatives discussed with patient.    Pt was given after visit summary which includes diagnoses, current medications & vitals.   Pt expressed understanding with the diagnosis and plan          Subjective:      Alissa Juarez is a 43 y.o. female who presents for had concerns including Pharyngitis and Back Pain.     Reports a 4 day history of sore throat, nausea, and chills.  No associated fever.  Sick contacts include her  who was briefly sick and has improved.  She has not attempted otc treatment. She has not completed home testing.      She reports poor sleep over the past two weeks.  She will wake up every hour then fall back asleep.  No recent medication changes.     Patient Active Problem List   Diagnosis    Chronic congestive heart failure

## 2024-01-19 DIAGNOSIS — I50.9 HEART FAILURE, UNSPECIFIED (HCC): ICD-10-CM

## 2024-01-19 DIAGNOSIS — R60.0 LOCALIZED EDEMA: ICD-10-CM

## 2024-01-19 RX ORDER — TORSEMIDE 20 MG/1
40 TABLET ORAL DAILY
Qty: 180 TABLET | Refills: 1 | Status: SHIPPED | OUTPATIENT
Start: 2024-01-19

## 2024-01-19 NOTE — TELEPHONE ENCOUNTER
Telephone call made to patient. Two patient identifiers verified.   Told her to get labs drawn. She hasn't had them done in 2 years.       Requested Prescriptions     Signed Prescriptions Disp Refills    torsemide (DEMADEX) 20 MG tablet 180 tablet 1     Sig: TAKE 2 TABLETS BY MOUTH EVERY DAY     Authorizing Provider: WAYNE DOWD     Ordering User: GIDEON VASQUEZ per MD    Future Appointments   Date Time Provider Department Center   2/2/2024 10:45 AM Linda Monique APRN - [facility] AM[facility]   4/18/2024 10:20 AM Wayne Dowd MD [facility] AM[facility]   11/26/2024  3:40 PM DAVINA MILIAN [facility] AMB   11/26/2024  4:00 PM Mikel Peres MD [facility]

## 2024-01-26 ENCOUNTER — APPOINTMENT (OUTPATIENT)
Facility: HOSPITAL | Age: 44
DRG: 637 | End: 2024-01-26
Payer: MEDICARE

## 2024-01-26 ENCOUNTER — HOSPITAL ENCOUNTER (INPATIENT)
Facility: HOSPITAL | Age: 44
LOS: 4 days | Discharge: HOME HEALTH | DRG: 637 | End: 2024-01-30
Attending: STUDENT IN AN ORGANIZED HEALTH CARE EDUCATION/TRAINING PROGRAM | Admitting: INTERNAL MEDICINE
Payer: MEDICARE

## 2024-01-26 DIAGNOSIS — Z79.4 TYPE 2 DIABETES MELLITUS WITH KETOACIDOSIS WITHOUT COMA, WITH LONG-TERM CURRENT USE OF INSULIN (HCC): ICD-10-CM

## 2024-01-26 DIAGNOSIS — E13.10 DIABETIC KETOACIDOSIS WITHOUT COMA ASSOCIATED WITH OTHER SPECIFIED DIABETES MELLITUS (HCC): Primary | ICD-10-CM

## 2024-01-26 DIAGNOSIS — R41.82 ALTERED MENTAL STATUS, UNSPECIFIED ALTERED MENTAL STATUS TYPE: ICD-10-CM

## 2024-01-26 DIAGNOSIS — N17.9 ACUTE KIDNEY INJURY (HCC): ICD-10-CM

## 2024-01-26 DIAGNOSIS — E11.10 TYPE 2 DIABETES MELLITUS WITH KETOACIDOSIS WITHOUT COMA, WITH LONG-TERM CURRENT USE OF INSULIN (HCC): ICD-10-CM

## 2024-01-26 PROBLEM — E08.10 DIABETIC KETOACIDOSIS WITHOUT COMA ASSOCIATED WITH DIABETES MELLITUS DUE TO UNDERLYING CONDITION (HCC): Status: ACTIVE | Noted: 2024-01-26

## 2024-01-26 LAB
ALBUMIN SERPL-MCNC: 3.9 G/DL (ref 3.5–5)
ALBUMIN/GLOB SERPL: 0.7 (ref 1.1–2.2)
ALP SERPL-CCNC: 93 U/L (ref 45–117)
ALT SERPL-CCNC: 47 U/L (ref 12–78)
AMPHET UR QL SCN: NEGATIVE
ANION GAP SERPL CALC-SCNC: 14 MMOL/L (ref 5–15)
ANION GAP SERPL CALC-SCNC: 16 MMOL/L (ref 5–15)
ANION GAP SERPL CALC-SCNC: 34 MMOL/L (ref 5–15)
ANION GAP SERPL CALC-SCNC: 8 MMOL/L (ref 5–15)
APPEARANCE UR: ABNORMAL
ARTERIAL PATENCY WRIST A: YES
AST SERPL-CCNC: 24 U/L (ref 15–37)
BACTERIA URNS QL MICRO: ABNORMAL /HPF
BARBITURATES UR QL SCN: NEGATIVE
BASE DEFICIT BLDA-SCNC: 7.3 MMOL/L
BASE DEFICIT BLDV-SCNC: 16.8 MMOL/L
BASOPHILS # BLD: 0.1 K/UL (ref 0–0.1)
BASOPHILS NFR BLD: 0 % (ref 0–1)
BDY SITE: ABNORMAL
BENZODIAZ UR QL: NEGATIVE
BILIRUB SERPL-MCNC: 0.8 MG/DL (ref 0.2–1)
BILIRUB UR QL: NEGATIVE
BUN SERPL-MCNC: 23 MG/DL (ref 6–20)
BUN SERPL-MCNC: 25 MG/DL (ref 6–20)
BUN SERPL-MCNC: 27 MG/DL (ref 6–20)
BUN SERPL-MCNC: 30 MG/DL (ref 6–20)
BUN/CREAT SERPL: 11 (ref 12–20)
BUN/CREAT SERPL: 15 (ref 12–20)
BUN/CREAT SERPL: 15 (ref 12–20)
BUN/CREAT SERPL: 17 (ref 12–20)
CALCIUM SERPL-MCNC: 10.5 MG/DL (ref 8.5–10.1)
CALCIUM SERPL-MCNC: 10.7 MG/DL (ref 8.5–10.1)
CALCIUM SERPL-MCNC: 13.4 MG/DL (ref 8.5–10.1)
CALCIUM SERPL-MCNC: 8.2 MG/DL (ref 8.5–10.1)
CANNABINOIDS UR QL SCN: NEGATIVE
CHLORIDE SERPL-SCNC: 102 MMOL/L (ref 97–108)
CHLORIDE SERPL-SCNC: 104 MMOL/L (ref 97–108)
CHLORIDE SERPL-SCNC: 111 MMOL/L (ref 97–108)
CHLORIDE SERPL-SCNC: 78 MMOL/L (ref 97–108)
CK SERPL-CCNC: 47 U/L (ref 26–192)
CO2 SERPL-SCNC: 11 MMOL/L (ref 21–32)
CO2 SERPL-SCNC: 20 MMOL/L (ref 21–32)
CO2 SERPL-SCNC: 23 MMOL/L (ref 21–32)
CO2 SERPL-SCNC: 8 MMOL/L (ref 21–32)
COCAINE UR QL SCN: NEGATIVE
COLOR UR: ABNORMAL
CREAT SERPL-MCNC: 1.32 MG/DL (ref 0.55–1.02)
CREAT SERPL-MCNC: 1.66 MG/DL (ref 0.55–1.02)
CREAT SERPL-MCNC: 1.8 MG/DL (ref 0.55–1.02)
CREAT SERPL-MCNC: 2.68 MG/DL (ref 0.55–1.02)
CRP SERPL-MCNC: <0.29 MG/DL (ref 0–0.3)
DIFFERENTIAL METHOD BLD: ABNORMAL
EKG ATRIAL RATE: 91 BPM
EKG DIAGNOSIS: NORMAL
EKG P AXIS: 61 DEGREES
EKG P-R INTERVAL: 168 MS
EKG Q-T INTERVAL: 360 MS
EKG QRS DURATION: 98 MS
EKG QTC CALCULATION (BAZETT): 442 MS
EKG R AXIS: 80 DEGREES
EKG T AXIS: 7 DEGREES
EKG VENTRICULAR RATE: 91 BPM
EOSINOPHIL # BLD: 0 K/UL (ref 0–0.4)
EOSINOPHIL NFR BLD: 0 % (ref 0–7)
EPITH CASTS URNS QL MICRO: ABNORMAL /LPF
ERYTHROCYTE [DISTWIDTH] IN BLOOD BY AUTOMATED COUNT: 14.8 % (ref 11.5–14.5)
EST. AVERAGE GLUCOSE BLD GHB EST-MCNC: ABNORMAL MG/DL
EST. AVERAGE GLUCOSE BLD GHB EST-MCNC: ABNORMAL MG/DL
GLOBULIN SER CALC-MCNC: 5.3 G/DL (ref 2–4)
GLUCOSE BLD STRIP.AUTO-MCNC: 236 MG/DL (ref 65–117)
GLUCOSE BLD STRIP.AUTO-MCNC: 280 MG/DL (ref 65–117)
GLUCOSE BLD STRIP.AUTO-MCNC: 308 MG/DL (ref 65–117)
GLUCOSE BLD STRIP.AUTO-MCNC: 363 MG/DL (ref 65–117)
GLUCOSE BLD STRIP.AUTO-MCNC: 380 MG/DL (ref 65–117)
GLUCOSE BLD STRIP.AUTO-MCNC: 437 MG/DL (ref 65–117)
GLUCOSE BLD STRIP.AUTO-MCNC: 457 MG/DL (ref 65–117)
GLUCOSE BLD STRIP.AUTO-MCNC: 483 MG/DL (ref 65–117)
GLUCOSE BLD STRIP.AUTO-MCNC: >600 MG/DL (ref 65–117)
GLUCOSE SERPL-MCNC: 1124 MG/DL (ref 65–100)
GLUCOSE SERPL-MCNC: 291 MG/DL (ref 65–100)
GLUCOSE SERPL-MCNC: 442 MG/DL (ref 65–100)
GLUCOSE SERPL-MCNC: 487 MG/DL (ref 65–100)
GLUCOSE UR STRIP.AUTO-MCNC: 500 MG/DL
HBA1C MFR BLD: >14 % (ref 4–5.6)
HBA1C MFR BLD: >14 % (ref 4–5.6)
HCG SERPL QL: NEGATIVE
HCO3 BLDA-SCNC: 16 MMOL/L (ref 22–26)
HCO3 BLDV-SCNC: 7.7 MMOL/L (ref 23–28)
HCT VFR BLD AUTO: 43.9 % (ref 35–47)
HGB BLD-MCNC: 14.7 G/DL (ref 11.5–16)
HGB UR QL STRIP: ABNORMAL
IMM GRANULOCYTES # BLD AUTO: 0.1 K/UL (ref 0–0.04)
IMM GRANULOCYTES NFR BLD AUTO: 1 % (ref 0–0.5)
KETONES UR QL STRIP.AUTO: 80 MG/DL
LACTATE BLD-SCNC: 3.19 MMOL/L (ref 0.4–2)
LACTATE BLD-SCNC: 3.3 MMOL/L (ref 0.4–2)
LACTATE SERPL-SCNC: 2.3 MMOL/L (ref 0.4–2)
LACTATE SERPL-SCNC: 2.5 MMOL/L (ref 0.4–2)
LACTATE SERPL-SCNC: 3.1 MMOL/L (ref 0.4–2)
LEUKOCYTE ESTERASE UR QL STRIP.AUTO: NEGATIVE
LIPASE SERPL-CCNC: 112 U/L (ref 13–75)
LYMPHOCYTES # BLD: 1.2 K/UL (ref 0.8–3.5)
LYMPHOCYTES NFR BLD: 11 % (ref 12–49)
Lab: NORMAL
MAGNESIUM SERPL-MCNC: 1.6 MG/DL (ref 1.6–2.4)
MAGNESIUM SERPL-MCNC: 1.8 MG/DL (ref 1.6–2.4)
MAGNESIUM SERPL-MCNC: 2 MG/DL (ref 1.6–2.4)
MAGNESIUM SERPL-MCNC: 2.2 MG/DL (ref 1.6–2.4)
MCH RBC QN AUTO: 29.2 PG (ref 26–34)
MCHC RBC AUTO-ENTMCNC: 33.5 G/DL (ref 30–36.5)
MCV RBC AUTO: 87.3 FL (ref 80–99)
METHADONE UR QL: NEGATIVE
MONOCYTES # BLD: 0.8 K/UL (ref 0–1)
MONOCYTES NFR BLD: 7 % (ref 5–13)
NEUTS SEG # BLD: 9.6 K/UL (ref 1.8–8)
NEUTS SEG NFR BLD: 81 % (ref 32–75)
NITRITE UR QL STRIP.AUTO: NEGATIVE
NRBC # BLD: 0 K/UL (ref 0–0.01)
NRBC BLD-RTO: 0 PER 100 WBC
NT PRO BNP: 114 PG/ML
OPIATES UR QL: NEGATIVE
PCO2 BLDA: 29 MMHG (ref 35–45)
PCO2 BLDV: 17.7 MMHG (ref 41–51)
PCP UR QL: NEGATIVE
PH BLDA: 7.38 (ref 7.35–7.45)
PH BLDV: 7.24 (ref 7.32–7.42)
PH UR STRIP: 5.5 (ref 5–8)
PHOSPHATE SERPL-MCNC: 2 MG/DL (ref 2.6–4.7)
PHOSPHATE SERPL-MCNC: 2.9 MG/DL (ref 2.6–4.7)
PHOSPHATE SERPL-MCNC: 3.1 MG/DL (ref 2.6–4.7)
PHOSPHATE SERPL-MCNC: 6.7 MG/DL (ref 2.6–4.7)
PLATELET # BLD AUTO: 261 K/UL (ref 150–400)
PMV BLD AUTO: 12.1 FL (ref 8.9–12.9)
PO2 BLDA: 84 MMHG (ref 80–100)
PO2 BLDV: 62 MMHG (ref 25–40)
POTASSIUM SERPL-SCNC: 3.3 MMOL/L (ref 3.5–5.1)
POTASSIUM SERPL-SCNC: 4.3 MMOL/L (ref 3.5–5.1)
POTASSIUM SERPL-SCNC: 4.5 MMOL/L (ref 3.5–5.1)
POTASSIUM SERPL-SCNC: 5.7 MMOL/L (ref 3.5–5.1)
PROCALCITONIN SERPL-MCNC: 0.09 NG/ML
PROT SERPL-MCNC: 9.2 G/DL (ref 6.4–8.2)
PROT UR STRIP-MCNC: 100 MG/DL
RBC # BLD AUTO: 5.03 M/UL (ref 3.8–5.2)
RBC #/AREA URNS HPF: ABNORMAL /HPF (ref 0–5)
SAO2 % BLD: 96 % (ref 92–97)
SAO2 % BLDV: 87.9 % (ref 65–88)
SAO2% DEVICE SAO2% SENSOR NAME: ABNORMAL
SERVICE CMNT-IMP: ABNORMAL
SODIUM SERPL-SCNC: 120 MMOL/L (ref 136–145)
SODIUM SERPL-SCNC: 135 MMOL/L (ref 136–145)
SODIUM SERPL-SCNC: 136 MMOL/L (ref 136–145)
SODIUM SERPL-SCNC: 138 MMOL/L (ref 136–145)
SP GR UR REFRACTOMETRY: 1.03
SPECIMEN SITE: ABNORMAL
SPECIMEN TYPE: ABNORMAL
TROPONIN I SERPL HS-MCNC: 10 NG/L (ref 0–51)
TSH SERPL DL<=0.05 MIU/L-ACNC: 1.26 UIU/ML (ref 0.36–3.74)
URINE CULTURE IF INDICATED: ABNORMAL
UROBILINOGEN UR QL STRIP.AUTO: 0.2 EU/DL (ref 0.2–1)
WBC # BLD AUTO: 11.8 K/UL (ref 3.6–11)
WBC URNS QL MICRO: ABNORMAL /HPF (ref 0–4)
YEAST BUDDING URNS QL: PRESENT

## 2024-01-26 PROCEDURE — 80047 BASIC METABLC PNL IONIZED CA: CPT

## 2024-01-26 PROCEDURE — 36600 WITHDRAWAL OF ARTERIAL BLOOD: CPT

## 2024-01-26 PROCEDURE — 84100 ASSAY OF PHOSPHORUS: CPT

## 2024-01-26 PROCEDURE — C9113 INJ PANTOPRAZOLE SODIUM, VIA: HCPCS | Performed by: INTERNAL MEDICINE

## 2024-01-26 PROCEDURE — 87040 BLOOD CULTURE FOR BACTERIA: CPT

## 2024-01-26 PROCEDURE — 71045 X-RAY EXAM CHEST 1 VIEW: CPT

## 2024-01-26 PROCEDURE — 86140 C-REACTIVE PROTEIN: CPT

## 2024-01-26 PROCEDURE — 83605 ASSAY OF LACTIC ACID: CPT

## 2024-01-26 PROCEDURE — 82550 ASSAY OF CK (CPK): CPT

## 2024-01-26 PROCEDURE — 81001 URINALYSIS AUTO W/SCOPE: CPT

## 2024-01-26 PROCEDURE — 70450 CT HEAD/BRAIN W/O DYE: CPT

## 2024-01-26 PROCEDURE — 84484 ASSAY OF TROPONIN QUANT: CPT

## 2024-01-26 PROCEDURE — 6360000002 HC RX W HCPCS: Performed by: INTERNAL MEDICINE

## 2024-01-26 PROCEDURE — 80307 DRUG TEST PRSMV CHEM ANLYZR: CPT

## 2024-01-26 PROCEDURE — 74176 CT ABD & PELVIS W/O CONTRAST: CPT

## 2024-01-26 PROCEDURE — 83690 ASSAY OF LIPASE: CPT

## 2024-01-26 PROCEDURE — 99221 1ST HOSP IP/OBS SF/LOW 40: CPT

## 2024-01-26 PROCEDURE — 82803 BLOOD GASES ANY COMBINATION: CPT

## 2024-01-26 PROCEDURE — 82962 GLUCOSE BLOOD TEST: CPT

## 2024-01-26 PROCEDURE — 85025 COMPLETE CBC W/AUTO DIFF WBC: CPT

## 2024-01-26 PROCEDURE — 80048 BASIC METABOLIC PNL TOTAL CA: CPT

## 2024-01-26 PROCEDURE — 2580000003 HC RX 258: Performed by: INTERNAL MEDICINE

## 2024-01-26 PROCEDURE — 83735 ASSAY OF MAGNESIUM: CPT

## 2024-01-26 PROCEDURE — 2580000003 HC RX 258: Performed by: STUDENT IN AN ORGANIZED HEALTH CARE EDUCATION/TRAINING PROGRAM

## 2024-01-26 PROCEDURE — 36415 COLL VENOUS BLD VENIPUNCTURE: CPT

## 2024-01-26 PROCEDURE — 84703 CHORIONIC GONADOTROPIN ASSAY: CPT

## 2024-01-26 PROCEDURE — 83880 ASSAY OF NATRIURETIC PEPTIDE: CPT

## 2024-01-26 PROCEDURE — 6370000000 HC RX 637 (ALT 250 FOR IP): Performed by: STUDENT IN AN ORGANIZED HEALTH CARE EDUCATION/TRAINING PROGRAM

## 2024-01-26 PROCEDURE — 84145 PROCALCITONIN (PCT): CPT

## 2024-01-26 PROCEDURE — A4216 STERILE WATER/SALINE, 10 ML: HCPCS | Performed by: INTERNAL MEDICINE

## 2024-01-26 PROCEDURE — 84443 ASSAY THYROID STIM HORMONE: CPT

## 2024-01-26 PROCEDURE — 80053 COMPREHEN METABOLIC PANEL: CPT

## 2024-01-26 PROCEDURE — 6370000000 HC RX 637 (ALT 250 FOR IP): Performed by: INTERNAL MEDICINE

## 2024-01-26 PROCEDURE — 99285 EMERGENCY DEPT VISIT HI MDM: CPT

## 2024-01-26 PROCEDURE — 83036 HEMOGLOBIN GLYCOSYLATED A1C: CPT

## 2024-01-26 PROCEDURE — 2060000000 HC ICU INTERMEDIATE R&B

## 2024-01-26 RX ORDER — SODIUM CHLORIDE 450 MG/100ML
INJECTION, SOLUTION INTRAVENOUS CONTINUOUS
Status: DISCONTINUED | OUTPATIENT
Start: 2024-01-26 | End: 2024-01-27

## 2024-01-26 RX ORDER — DEXTROSE MONOHYDRATE 100 MG/ML
INJECTION, SOLUTION INTRAVENOUS CONTINUOUS PRN
Status: DISCONTINUED | OUTPATIENT
Start: 2024-01-26 | End: 2024-01-27 | Stop reason: SDUPTHER

## 2024-01-26 RX ORDER — POLYETHYLENE GLYCOL 3350 17 G/17G
17 POWDER, FOR SOLUTION ORAL DAILY PRN
Status: DISCONTINUED | OUTPATIENT
Start: 2024-01-26 | End: 2024-01-30 | Stop reason: HOSPADM

## 2024-01-26 RX ORDER — POTASSIUM CHLORIDE 20 MEQ/1
40 TABLET, EXTENDED RELEASE ORAL PRN
Status: DISCONTINUED | OUTPATIENT
Start: 2024-01-26 | End: 2024-01-26

## 2024-01-26 RX ORDER — POTASSIUM CHLORIDE 7.45 MG/ML
10 INJECTION INTRAVENOUS PRN
Status: DISCONTINUED | OUTPATIENT
Start: 2024-01-26 | End: 2024-01-26

## 2024-01-26 RX ORDER — LEVOTHYROXINE SODIUM 0.05 MG/1
100 TABLET ORAL DAILY
Status: DISCONTINUED | OUTPATIENT
Start: 2024-01-26 | End: 2024-01-30 | Stop reason: HOSPADM

## 2024-01-26 RX ORDER — DEXTROSE AND SODIUM CHLORIDE 5; .45 G/100ML; G/100ML
INJECTION, SOLUTION INTRAVENOUS CONTINUOUS PRN
Status: DISCONTINUED | OUTPATIENT
Start: 2024-01-26 | End: 2024-01-27

## 2024-01-26 RX ORDER — POTASSIUM CHLORIDE 7.45 MG/ML
10 INJECTION INTRAVENOUS
Status: COMPLETED | OUTPATIENT
Start: 2024-01-26 | End: 2024-01-26

## 2024-01-26 RX ORDER — ACETAMINOPHEN 650 MG/1
650 SUPPOSITORY RECTAL EVERY 6 HOURS PRN
Status: DISCONTINUED | OUTPATIENT
Start: 2024-01-26 | End: 2024-01-30 | Stop reason: HOSPADM

## 2024-01-26 RX ORDER — SODIUM CHLORIDE, SODIUM LACTATE, POTASSIUM CHLORIDE, AND CALCIUM CHLORIDE .6; .31; .03; .02 G/100ML; G/100ML; G/100ML; G/100ML
1000 INJECTION, SOLUTION INTRAVENOUS ONCE
Status: COMPLETED | OUTPATIENT
Start: 2024-01-26 | End: 2024-01-26

## 2024-01-26 RX ORDER — POTASSIUM CHLORIDE 7.45 MG/ML
10 INJECTION INTRAVENOUS PRN
Status: DISCONTINUED | OUTPATIENT
Start: 2024-01-26 | End: 2024-01-30 | Stop reason: HOSPADM

## 2024-01-26 RX ORDER — ATORVASTATIN CALCIUM 20 MG/1
20 TABLET, FILM COATED ORAL DAILY
Status: DISCONTINUED | OUTPATIENT
Start: 2024-01-26 | End: 2024-01-30 | Stop reason: HOSPADM

## 2024-01-26 RX ORDER — SODIUM CHLORIDE 9 MG/ML
INJECTION, SOLUTION INTRAVENOUS PRN
Status: DISCONTINUED | OUTPATIENT
Start: 2024-01-26 | End: 2024-01-30 | Stop reason: HOSPADM

## 2024-01-26 RX ORDER — 0.9 % SODIUM CHLORIDE 0.9 %
1000 INTRAVENOUS SOLUTION INTRAVENOUS ONCE
Status: COMPLETED | OUTPATIENT
Start: 2024-01-26 | End: 2024-01-26

## 2024-01-26 RX ORDER — ONDANSETRON 4 MG/1
4 TABLET, ORALLY DISINTEGRATING ORAL EVERY 8 HOURS PRN
Status: DISCONTINUED | OUTPATIENT
Start: 2024-01-26 | End: 2024-01-30 | Stop reason: HOSPADM

## 2024-01-26 RX ORDER — ENOXAPARIN SODIUM 100 MG/ML
30 INJECTION SUBCUTANEOUS 2 TIMES DAILY
Status: DISCONTINUED | OUTPATIENT
Start: 2024-01-26 | End: 2024-01-30 | Stop reason: HOSPADM

## 2024-01-26 RX ORDER — SODIUM CHLORIDE 0.9 % (FLUSH) 0.9 %
5-40 SYRINGE (ML) INJECTION PRN
Status: DISCONTINUED | OUTPATIENT
Start: 2024-01-26 | End: 2024-01-30 | Stop reason: HOSPADM

## 2024-01-26 RX ORDER — ACETAMINOPHEN 325 MG/1
650 TABLET ORAL EVERY 6 HOURS PRN
Status: DISCONTINUED | OUTPATIENT
Start: 2024-01-26 | End: 2024-01-30 | Stop reason: HOSPADM

## 2024-01-26 RX ORDER — MAGNESIUM SULFATE IN WATER 40 MG/ML
2000 INJECTION, SOLUTION INTRAVENOUS PRN
Status: DISCONTINUED | OUTPATIENT
Start: 2024-01-26 | End: 2024-01-30 | Stop reason: HOSPADM

## 2024-01-26 RX ORDER — SODIUM CHLORIDE 0.9 % (FLUSH) 0.9 %
5-40 SYRINGE (ML) INJECTION EVERY 12 HOURS SCHEDULED
Status: DISCONTINUED | OUTPATIENT
Start: 2024-01-26 | End: 2024-01-30 | Stop reason: HOSPADM

## 2024-01-26 RX ORDER — ONDANSETRON 2 MG/ML
4 INJECTION INTRAMUSCULAR; INTRAVENOUS EVERY 6 HOURS PRN
Status: DISCONTINUED | OUTPATIENT
Start: 2024-01-26 | End: 2024-01-30 | Stop reason: HOSPADM

## 2024-01-26 RX ORDER — POTASSIUM CHLORIDE 1.5 G/1.58G
40 POWDER, FOR SOLUTION ORAL PRN
Status: DISCONTINUED | OUTPATIENT
Start: 2024-01-26 | End: 2024-01-26

## 2024-01-26 RX ADMIN — SODIUM CHLORIDE, PRESERVATIVE FREE 10 ML: 5 INJECTION INTRAVENOUS at 20:31

## 2024-01-26 RX ADMIN — SODIUM CHLORIDE: 4.5 INJECTION, SOLUTION INTRAVENOUS at 16:50

## 2024-01-26 RX ADMIN — ATORVASTATIN CALCIUM 20 MG: 20 TABLET, FILM COATED ORAL at 13:14

## 2024-01-26 RX ADMIN — SODIUM CHLORIDE 1000 ML: 9 INJECTION, SOLUTION INTRAVENOUS at 08:04

## 2024-01-26 RX ADMIN — SODIUM CHLORIDE 21.21 UNITS/HR: 9 INJECTION, SOLUTION INTRAVENOUS at 20:21

## 2024-01-26 RX ADMIN — LEVOTHYROXINE SODIUM 100 MCG: 0.1 TABLET ORAL at 13:13

## 2024-01-26 RX ADMIN — SODIUM CHLORIDE 16.92 UNITS/HR: 9 INJECTION, SOLUTION INTRAVENOUS at 16:13

## 2024-01-26 RX ADMIN — POTASSIUM CHLORIDE 10 MEQ: 7.46 INJECTION, SOLUTION INTRAVENOUS at 20:30

## 2024-01-26 RX ADMIN — ENOXAPARIN SODIUM 30 MG: 100 INJECTION SUBCUTANEOUS at 13:13

## 2024-01-26 RX ADMIN — POTASSIUM CHLORIDE 10 MEQ: 7.46 INJECTION, SOLUTION INTRAVENOUS at 21:36

## 2024-01-26 RX ADMIN — DEXTROSE AND SODIUM CHLORIDE: 5; 450 INJECTION, SOLUTION INTRAVENOUS at 23:44

## 2024-01-26 RX ADMIN — PANTOPRAZOLE SODIUM 40 MG: 40 INJECTION, POWDER, FOR SOLUTION INTRAVENOUS at 13:14

## 2024-01-26 RX ADMIN — SODIUM CHLORIDE 10.82 UNITS/HR: 9 INJECTION, SOLUTION INTRAVENOUS at 10:20

## 2024-01-26 RX ADMIN — SODIUM CHLORIDE 1000 ML: 9 INJECTION, SOLUTION INTRAVENOUS at 09:43

## 2024-01-26 RX ADMIN — SODIUM CHLORIDE, POTASSIUM CHLORIDE, SODIUM LACTATE AND CALCIUM CHLORIDE 1000 ML: 600; 310; 30; 20 INJECTION, SOLUTION INTRAVENOUS at 10:09

## 2024-01-26 RX ADMIN — SODIUM CHLORIDE: 4.5 INJECTION, SOLUTION INTRAVENOUS at 12:38

## 2024-01-26 RX ADMIN — ENOXAPARIN SODIUM 30 MG: 100 INJECTION SUBCUTANEOUS at 20:30

## 2024-01-26 RX ADMIN — SODIUM CHLORIDE: 4.5 INJECTION, SOLUTION INTRAVENOUS at 20:55

## 2024-01-26 RX ADMIN — SODIUM CHLORIDE 1000 MG: 900 INJECTION INTRAVENOUS at 13:14

## 2024-01-26 ASSESSMENT — PAIN SCALES - GENERAL
PAINLEVEL_OUTOF10: 9
PAINLEVEL_OUTOF10: 0
PAINLEVEL_OUTOF10: 10
PAINLEVEL_OUTOF10: 0

## 2024-01-26 ASSESSMENT — PAIN DESCRIPTION - LOCATION
LOCATION: GENERALIZED
LOCATION: ABDOMEN

## 2024-01-26 ASSESSMENT — PAIN - FUNCTIONAL ASSESSMENT
PAIN_FUNCTIONAL_ASSESSMENT: 0-10
PAIN_FUNCTIONAL_ASSESSMENT: 0-10

## 2024-01-26 ASSESSMENT — LIFESTYLE VARIABLES
HOW MANY STANDARD DRINKS CONTAINING ALCOHOL DO YOU HAVE ON A TYPICAL DAY: PATIENT DOES NOT DRINK
HOW OFTEN DO YOU HAVE A DRINK CONTAINING ALCOHOL: NEVER

## 2024-01-26 NOTE — ED NOTES
Verbal shift change report given to CRISTAL Elaine (oncoming nurse) by CRISTAL Mcdonald (offgoing nurse). Report included the following information Nurse Handoff Report, ED Encounter Summary, ED SBAR, Intake/Output, MAR, Recent Results, and Neuro Assessment.

## 2024-01-26 NOTE — H&P
Negative NEG      PCP, Urine Negative NEG      THC, TH-Cannabinol, Urine Negative NEG      Comments: (NOTE)    POCT Glucose    Collection Time: 01/26/24 11:23 AM   Result Value Ref Range    POC Glucose >600 (HH) 65 - 117 mg/dL    Performed by: Jose Mcdonald RN    POCT Glucose    Collection Time: 01/26/24 12:36 PM   Result Value Ref Range    POC Glucose >600 (HH) 65 - 117 mg/dL    Performed by: Jose Mcdonald RN          CT ABDOMEN PELVIS WO CONTRAST Additional Contrast? None    Result Date: 1/26/2024  EXAM: CT ABDOMEN PELVIS WO CONTRAST INDICATION: altered mental status, weight loss, epigastric abdominal pain COMPARISON: CT 3/1/2011. IV CONTRAST: None. ORAL CONTRAST: None. TECHNIQUE: Thin axial images were obtained through the abdomen and pelvis. Coronal and sagittal reformats were generated. CT dose reduction was achieved through use of a standardized protocol tailored for this examination and automatic exposure control for dose modulation. The absence of intravenous contrast material reduces the sensitivity for evaluation of the vasculature and solid organs. FINDINGS: LOWER THORAX: The visualized lung bases are clear. The visualized heart is normal in size without pericardial effusion. Coronary artery calcifications, pacemaker/AICD lead, and mitral valve annular prosthesis are noted. LIVER: No mass. BILIARY TREE: Gallbladder is within normal limits. CBD is not dilated. SPLEEN: Unremarkable. PANCREAS: No focal abnormality. ADRENALS: Unremarkable. KIDNEYS/URETERS: No calculus or hydronephrosis. STOMACH: Unremarkable. SMALL BOWEL: No dilatation or wall thickening. COLON: No dilation wall thickening. Noninflamed appearing scattered diverticula are noted. APPENDIX: Unremarkable. PERITONEUM: No ascites or pneumoperitoneum. RETROPERITONEUM: Atherosclerotic calcification without aneurysm. No enlarged lymphadenopathy. REPRODUCTIVE ORGANS: Uterus is surgically absent. Ovaries appear unremarkable. URINARY BLADDER: No

## 2024-01-26 NOTE — CONSULTS
[facility] INITIAL ASSESSMENT.      Name: Alissa Juarez   : 1980   MRN: [id number]   Date: 2024        Chief Complaint   Patient presents with    Shortness of Breath     Pt comes in via triage unable to ambulate to scale with a cc of sob that started a couple weeks ago, nausea and vomiting that started a couple days ago. Pt also complains of increased weight loss over the last month, approximately 40lbs. Patient diagnosed with COVID a week and a half ago. Pt having trouble ambulating at home.     Emesis    Generalized Body Aches    Fatigue         HPI:   43 year old female with NICM (last EF normal) presented with \"several days' of lethargy, difficulty ambulating and N/V, and month of weight loss.  She was recently seen by her PCP and treated for COVID with paxlovid .  Per her , she had a previous admit for hyperglycemia. She was placed on Trulicity as outpatient, but that was stopped due to normalization of her blood glucose.  She monitors her BG intermittently.  In December, she had a BG >300 but it does not appear any further action taken.  Today, she has BG >1000, VBG 7.24 / .  She was given 2L NS and started on insulin ggt      Assessment/Plan:   43 year old female with h/o NICM and recent covid here for hyperglycemia. Profound hyperglycemia may be secondary to covid infection. Patient appears very dry on exam which likely has led to MAME.  She is lethargic but awake and protecting her airway.  BP and HR are acceptable.     Recommendations  - Continue fluid resuscitation  - monitor electrolytes closely  - infectious workup including cultures / procal / wbc / imaging  - monitor for signs of volume overload with fluid admin  - UDS, UPT,   - consult dietary and diabetes management for assistance  - bladder scan +/- conklin if persistent no UOP    Okay for stepdown level of care at this time.  Please re consult ICU if any worsening or concerns   Kimberlyn Manrique MD

## 2024-01-26 NOTE — ED PROVIDER NOTES
signed)      (Please note that parts of this dictation were completed with voice recognition software. Quite often unanticipated grammatical, syntax, homophones, and other interpretive errors are inadvertently transcribed by the computer software. Please disregards these errors. Please excuse any errors that have escaped final proofreading.)          Anabella Sorensen, DO  Resident  01/27/24 1521

## 2024-01-26 NOTE — ED NOTES
Report given to CRISTAL Mcdonald. Nurse was informed of reason for arrival, vitals, labs, medications, orders, procedures, results, anything left pending and current plan of action. Questions were asked and received prior to departure from the patient.

## 2024-01-27 LAB
ALBUMIN SERPL-MCNC: 3.2 G/DL (ref 3.5–5)
ALBUMIN/GLOB SERPL: 0.7 (ref 1.1–2.2)
ALP SERPL-CCNC: 71 U/L (ref 45–117)
ALT SERPL-CCNC: 32 U/L (ref 12–78)
ANION GAP SERPL CALC-SCNC: 8 MMOL/L (ref 5–15)
ANION GAP SERPL CALC-SCNC: 8 MMOL/L (ref 5–15)
APPEARANCE UR: ABNORMAL
AST SERPL-CCNC: 34 U/L (ref 15–37)
BACTERIA URNS QL MICRO: NEGATIVE /HPF
BASOPHILS # BLD: 0 K/UL (ref 0–0.1)
BASOPHILS NFR BLD: 0 % (ref 0–1)
BILIRUB SERPL-MCNC: 0.7 MG/DL (ref 0.2–1)
BILIRUB UR QL: NEGATIVE
BUN SERPL-MCNC: 23 MG/DL (ref 6–20)
BUN SERPL-MCNC: 24 MG/DL (ref 6–20)
BUN/CREAT SERPL: 16 (ref 12–20)
BUN/CREAT SERPL: 17 (ref 12–20)
CALCIUM SERPL-MCNC: 10.6 MG/DL (ref 8.5–10.1)
CALCIUM SERPL-MCNC: 11 MG/DL (ref 8.5–10.1)
CHLORIDE SERPL-SCNC: 105 MMOL/L (ref 97–108)
CHLORIDE SERPL-SCNC: 105 MMOL/L (ref 97–108)
CO2 SERPL-SCNC: 23 MMOL/L (ref 21–32)
CO2 SERPL-SCNC: 24 MMOL/L (ref 21–32)
COLOR UR: ABNORMAL
CREAT SERPL-MCNC: 1.38 MG/DL (ref 0.55–1.02)
CREAT SERPL-MCNC: 1.48 MG/DL (ref 0.55–1.02)
DIFFERENTIAL METHOD BLD: ABNORMAL
EOSINOPHIL # BLD: 0 K/UL (ref 0–0.4)
EOSINOPHIL NFR BLD: 0 % (ref 0–7)
EPITH CASTS URNS QL MICRO: ABNORMAL /LPF
ERYTHROCYTE [DISTWIDTH] IN BLOOD BY AUTOMATED COUNT: 14.7 % (ref 11.5–14.5)
EST. AVERAGE GLUCOSE BLD GHB EST-MCNC: ABNORMAL MG/DL
GLOBULIN SER CALC-MCNC: 4.6 G/DL (ref 2–4)
GLUCOSE BLD STRIP.AUTO-MCNC: 127 MG/DL (ref 65–117)
GLUCOSE BLD STRIP.AUTO-MCNC: 128 MG/DL (ref 65–117)
GLUCOSE BLD STRIP.AUTO-MCNC: 144 MG/DL (ref 65–117)
GLUCOSE BLD STRIP.AUTO-MCNC: 166 MG/DL (ref 65–117)
GLUCOSE BLD STRIP.AUTO-MCNC: 184 MG/DL (ref 65–117)
GLUCOSE BLD STRIP.AUTO-MCNC: 195 MG/DL (ref 65–117)
GLUCOSE BLD STRIP.AUTO-MCNC: 201 MG/DL (ref 65–117)
GLUCOSE BLD STRIP.AUTO-MCNC: 205 MG/DL (ref 65–117)
GLUCOSE BLD STRIP.AUTO-MCNC: 216 MG/DL (ref 65–117)
GLUCOSE BLD STRIP.AUTO-MCNC: 224 MG/DL (ref 65–117)
GLUCOSE BLD STRIP.AUTO-MCNC: 391 MG/DL (ref 65–117)
GLUCOSE BLD STRIP.AUTO-MCNC: 413 MG/DL (ref 65–117)
GLUCOSE SERPL-MCNC: 128 MG/DL (ref 65–100)
GLUCOSE SERPL-MCNC: 191 MG/DL (ref 65–100)
GLUCOSE UR STRIP.AUTO-MCNC: 250 MG/DL
HBA1C MFR BLD: >14 % (ref 4–5.6)
HBV SURFACE AG SER QL: 0.45 INDEX
HBV SURFACE AG SER QL: NEGATIVE
HCT VFR BLD AUTO: 37.9 % (ref 35–47)
HCV AB SER IA-ACNC: 0.19 INDEX
HCV AB SERPL QL IA: NONREACTIVE
HGB BLD-MCNC: 12.9 G/DL (ref 11.5–16)
HGB UR QL STRIP: ABNORMAL
HIV1 P24 AG SERPL QL IA: NONREACTIVE
HIV1+2 AB SERPL QL IA: NONREACTIVE
IMM GRANULOCYTES # BLD AUTO: 0.1 K/UL (ref 0–0.04)
IMM GRANULOCYTES NFR BLD AUTO: 1 % (ref 0–0.5)
KETONES UR QL STRIP.AUTO: 15 MG/DL
LACTATE SERPL-SCNC: 2.1 MMOL/L (ref 0.4–2)
LACTATE SERPL-SCNC: 3 MMOL/L (ref 0.4–2)
LEUKOCYTE ESTERASE UR QL STRIP.AUTO: ABNORMAL
LYMPHOCYTES # BLD: 2.8 K/UL (ref 0.8–3.5)
LYMPHOCYTES NFR BLD: 19 % (ref 12–49)
MAGNESIUM SERPL-MCNC: 1.7 MG/DL (ref 1.6–2.4)
MCH RBC QN AUTO: 29.1 PG (ref 26–34)
MCHC RBC AUTO-ENTMCNC: 34 G/DL (ref 30–36.5)
MCV RBC AUTO: 85.4 FL (ref 80–99)
MONOCYTES # BLD: 1.6 K/UL (ref 0–1)
MONOCYTES NFR BLD: 11 % (ref 5–13)
NEUTS SEG # BLD: 9.9 K/UL (ref 1.8–8)
NEUTS SEG NFR BLD: 69 % (ref 32–75)
NITRITE UR QL STRIP.AUTO: NEGATIVE
NRBC # BLD: 0 K/UL (ref 0–0.01)
NRBC BLD-RTO: 0 PER 100 WBC
PH UR STRIP: 6 (ref 5–8)
PHOSPHATE SERPL-MCNC: 1.8 MG/DL (ref 2.6–4.7)
PHOSPHATE SERPL-MCNC: 1.8 MG/DL (ref 2.6–4.7)
PLATELET # BLD AUTO: 247 K/UL (ref 150–400)
PMV BLD AUTO: 11.7 FL (ref 8.9–12.9)
POTASSIUM SERPL-SCNC: 3.8 MMOL/L (ref 3.5–5.1)
POTASSIUM SERPL-SCNC: 3.9 MMOL/L (ref 3.5–5.1)
PROT SERPL-MCNC: 7.8 G/DL (ref 6.4–8.2)
PROT UR STRIP-MCNC: 300 MG/DL
RBC # BLD AUTO: 4.44 M/UL (ref 3.8–5.2)
RBC #/AREA URNS HPF: ABNORMAL /HPF (ref 0–5)
SERVICE CMNT-IMP: ABNORMAL
SODIUM SERPL-SCNC: 136 MMOL/L (ref 136–145)
SODIUM SERPL-SCNC: 137 MMOL/L (ref 136–145)
SP GR UR REFRACTOMETRY: 1.03
URINE CULTURE IF INDICATED: ABNORMAL
UROBILINOGEN UR QL STRIP.AUTO: 1 EU/DL (ref 0.2–1)
WBC # BLD AUTO: 14.3 K/UL (ref 3.6–11)
WBC URNS QL MICRO: ABNORMAL /HPF (ref 0–4)

## 2024-01-27 PROCEDURE — 6370000000 HC RX 637 (ALT 250 FOR IP): Performed by: INTERNAL MEDICINE

## 2024-01-27 PROCEDURE — A4216 STERILE WATER/SALINE, 10 ML: HCPCS | Performed by: INTERNAL MEDICINE

## 2024-01-27 PROCEDURE — 83036 HEMOGLOBIN GLYCOSYLATED A1C: CPT

## 2024-01-27 PROCEDURE — 81001 URINALYSIS AUTO W/SCOPE: CPT

## 2024-01-27 PROCEDURE — 84100 ASSAY OF PHOSPHORUS: CPT

## 2024-01-27 PROCEDURE — 97530 THERAPEUTIC ACTIVITIES: CPT

## 2024-01-27 PROCEDURE — 2580000003 HC RX 258: Performed by: INTERNAL MEDICINE

## 2024-01-27 PROCEDURE — C9113 INJ PANTOPRAZOLE SODIUM, VIA: HCPCS | Performed by: INTERNAL MEDICINE

## 2024-01-27 PROCEDURE — 2580000003 HC RX 258: Performed by: HOSPITALIST

## 2024-01-27 PROCEDURE — 2060000000 HC ICU INTERMEDIATE R&B

## 2024-01-27 PROCEDURE — 85025 COMPLETE CBC W/AUTO DIFF WBC: CPT

## 2024-01-27 PROCEDURE — 6370000000 HC RX 637 (ALT 250 FOR IP): Performed by: STUDENT IN AN ORGANIZED HEALTH CARE EDUCATION/TRAINING PROGRAM

## 2024-01-27 PROCEDURE — 82962 GLUCOSE BLOOD TEST: CPT

## 2024-01-27 PROCEDURE — 87086 URINE CULTURE/COLONY COUNT: CPT

## 2024-01-27 PROCEDURE — 83605 ASSAY OF LACTIC ACID: CPT

## 2024-01-27 PROCEDURE — 83735 ASSAY OF MAGNESIUM: CPT

## 2024-01-27 PROCEDURE — 80053 COMPREHEN METABOLIC PANEL: CPT

## 2024-01-27 PROCEDURE — 36415 COLL VENOUS BLD VENIPUNCTURE: CPT

## 2024-01-27 PROCEDURE — 6360000002 HC RX W HCPCS: Performed by: INTERNAL MEDICINE

## 2024-01-27 PROCEDURE — 97162 PT EVAL MOD COMPLEX 30 MIN: CPT

## 2024-01-27 PROCEDURE — 51702 INSERT TEMP BLADDER CATH: CPT

## 2024-01-27 PROCEDURE — 97165 OT EVAL LOW COMPLEX 30 MIN: CPT

## 2024-01-27 PROCEDURE — 97535 SELF CARE MNGMENT TRAINING: CPT

## 2024-01-27 PROCEDURE — 2580000003 HC RX 258: Performed by: STUDENT IN AN ORGANIZED HEALTH CARE EDUCATION/TRAINING PROGRAM

## 2024-01-27 RX ORDER — GLUCAGON 1 MG/ML
1 KIT INJECTION PRN
Status: DISCONTINUED | OUTPATIENT
Start: 2024-01-27 | End: 2024-01-30 | Stop reason: HOSPADM

## 2024-01-27 RX ORDER — INSULIN GLARGINE 100 [IU]/ML
10 INJECTION, SOLUTION SUBCUTANEOUS ONCE
Status: COMPLETED | OUTPATIENT
Start: 2024-01-27 | End: 2024-01-27

## 2024-01-27 RX ORDER — INSULIN GLARGINE 100 [IU]/ML
20 INJECTION, SOLUTION SUBCUTANEOUS DAILY
Status: DISCONTINUED | OUTPATIENT
Start: 2024-01-27 | End: 2024-01-27

## 2024-01-27 RX ORDER — SODIUM CHLORIDE 9 MG/ML
INJECTION, SOLUTION INTRAVENOUS CONTINUOUS
Status: DISCONTINUED | OUTPATIENT
Start: 2024-01-27 | End: 2024-01-29

## 2024-01-27 RX ORDER — DEXTROSE MONOHYDRATE 100 MG/ML
INJECTION, SOLUTION INTRAVENOUS CONTINUOUS PRN
Status: DISCONTINUED | OUTPATIENT
Start: 2024-01-27 | End: 2024-01-30 | Stop reason: HOSPADM

## 2024-01-27 RX ORDER — INSULIN LISPRO 100 [IU]/ML
0-4 INJECTION, SOLUTION INTRAVENOUS; SUBCUTANEOUS
Status: DISCONTINUED | OUTPATIENT
Start: 2024-01-27 | End: 2024-01-30 | Stop reason: HOSPADM

## 2024-01-27 RX ORDER — 0.9 % SODIUM CHLORIDE 0.9 %
500 INTRAVENOUS SOLUTION INTRAVENOUS ONCE
Status: COMPLETED | OUTPATIENT
Start: 2024-01-27 | End: 2024-01-27

## 2024-01-27 RX ORDER — INSULIN LISPRO 100 [IU]/ML
4 INJECTION, SOLUTION INTRAVENOUS; SUBCUTANEOUS ONCE
Status: COMPLETED | OUTPATIENT
Start: 2024-01-27 | End: 2024-01-27

## 2024-01-27 RX ORDER — INSULIN GLARGINE 100 [IU]/ML
30 INJECTION, SOLUTION SUBCUTANEOUS DAILY
Status: DISCONTINUED | OUTPATIENT
Start: 2024-01-28 | End: 2024-01-28

## 2024-01-27 RX ORDER — INSULIN LISPRO 100 [IU]/ML
0-4 INJECTION, SOLUTION INTRAVENOUS; SUBCUTANEOUS NIGHTLY
Status: DISCONTINUED | OUTPATIENT
Start: 2024-01-27 | End: 2024-01-30 | Stop reason: HOSPADM

## 2024-01-27 RX ADMIN — ENOXAPARIN SODIUM 30 MG: 100 INJECTION SUBCUTANEOUS at 21:30

## 2024-01-27 RX ADMIN — SODIUM CHLORIDE 22.96 UNITS/HR: 9 INJECTION, SOLUTION INTRAVENOUS at 00:49

## 2024-01-27 RX ADMIN — DIBASIC SODIUM PHOSPHATE, MONOBASIC POTASSIUM PHOSPHATE AND MONOBASIC SODIUM PHOSPHATE 1 TABLET: 852; 155; 130 TABLET ORAL at 08:52

## 2024-01-27 RX ADMIN — SODIUM CHLORIDE, PRESERVATIVE FREE 10 ML: 5 INJECTION INTRAVENOUS at 08:11

## 2024-01-27 RX ADMIN — SODIUM CHLORIDE 500 ML: 9 INJECTION, SOLUTION INTRAVENOUS at 01:42

## 2024-01-27 RX ADMIN — INSULIN LISPRO 4 UNITS: 100 INJECTION, SOLUTION INTRAVENOUS; SUBCUTANEOUS at 17:32

## 2024-01-27 RX ADMIN — SODIUM CHLORIDE, PRESERVATIVE FREE 10 ML: 5 INJECTION INTRAVENOUS at 21:30

## 2024-01-27 RX ADMIN — SODIUM CHLORIDE 10.65 UNITS/HR: 9 INJECTION, SOLUTION INTRAVENOUS at 08:50

## 2024-01-27 RX ADMIN — ATORVASTATIN CALCIUM 20 MG: 20 TABLET, FILM COATED ORAL at 08:08

## 2024-01-27 RX ADMIN — SODIUM CHLORIDE: 9 INJECTION, SOLUTION INTRAVENOUS at 11:14

## 2024-01-27 RX ADMIN — DIBASIC SODIUM PHOSPHATE, MONOBASIC POTASSIUM PHOSPHATE AND MONOBASIC SODIUM PHOSPHATE 1 TABLET: 852; 155; 130 TABLET ORAL at 21:29

## 2024-01-27 RX ADMIN — DIBASIC SODIUM PHOSPHATE, MONOBASIC POTASSIUM PHOSPHATE AND MONOBASIC SODIUM PHOSPHATE 1 TABLET: 852; 155; 130 TABLET ORAL at 13:16

## 2024-01-27 RX ADMIN — INSULIN GLARGINE 10 UNITS: 100 INJECTION, SOLUTION SUBCUTANEOUS at 17:32

## 2024-01-27 RX ADMIN — PANTOPRAZOLE SODIUM 40 MG: 40 INJECTION, POWDER, FOR SOLUTION INTRAVENOUS at 08:11

## 2024-01-27 RX ADMIN — SODIUM CHLORIDE: 9 INJECTION, SOLUTION INTRAVENOUS at 13:19

## 2024-01-27 RX ADMIN — DEXTROSE AND SODIUM CHLORIDE: 5; 450 INJECTION, SOLUTION INTRAVENOUS at 06:18

## 2024-01-27 RX ADMIN — ACETAMINOPHEN 650 MG: 325 TABLET ORAL at 21:29

## 2024-01-27 RX ADMIN — SODIUM CHLORIDE 1000 MG: 900 INJECTION INTRAVENOUS at 13:20

## 2024-01-27 RX ADMIN — ENOXAPARIN SODIUM 30 MG: 100 INJECTION SUBCUTANEOUS at 08:52

## 2024-01-27 RX ADMIN — INSULIN LISPRO 4 UNITS: 100 INJECTION, SOLUTION INTRAVENOUS; SUBCUTANEOUS at 17:33

## 2024-01-27 RX ADMIN — INSULIN GLARGINE 20 UNITS: 100 INJECTION, SOLUTION SUBCUTANEOUS at 08:51

## 2024-01-27 RX ADMIN — LEVOTHYROXINE SODIUM 100 MCG: 0.1 TABLET ORAL at 08:08

## 2024-01-27 RX ADMIN — INSULIN LISPRO 1 UNITS: 100 INJECTION, SOLUTION INTRAVENOUS; SUBCUTANEOUS at 12:19

## 2024-01-27 RX ADMIN — INSULIN LISPRO 4 UNITS: 100 INJECTION, SOLUTION INTRAVENOUS; SUBCUTANEOUS at 21:29

## 2024-01-27 RX ADMIN — ACETAMINOPHEN 650 MG: 325 TABLET ORAL at 08:08

## 2024-01-27 ASSESSMENT — PAIN DESCRIPTION - DESCRIPTORS
DESCRIPTORS: ACHING
DESCRIPTORS: DISCOMFORT

## 2024-01-27 ASSESSMENT — PAIN SCALES - GENERAL
PAINLEVEL_OUTOF10: 2
PAINLEVEL_OUTOF10: 5
PAINLEVEL_OUTOF10: 2
PAINLEVEL_OUTOF10: 0
PAINLEVEL_OUTOF10: 10
PAINLEVEL_OUTOF10: 0

## 2024-01-27 ASSESSMENT — PAIN DESCRIPTION - LOCATION
LOCATION: HEAD
LOCATION: GENERALIZED

## 2024-01-27 NOTE — PLAN OF CARE
Problem: Physical Therapy - Adult  Goal: By Discharge: Performs mobility at highest level of function for planned discharge setting.  See evaluation for individualized goals.  Description: FUNCTIONAL STATUS PRIOR TO ADMISSION: Patient was modified independent using a rollator for functional mobility.    HOME SUPPORT PRIOR TO ADMISSION: The patient lived with her  but did not require assistance.    Physical Therapy Goals  Initiated 1/27/2024  1.  Patient will move from supine to sit and sit to supine, scoot up and down, and roll side to side in bed with modified independence within 7 day(s).    2.  Patient will perform sit to stand with modified independence within 7 day(s).  3.  Patient will transfer from bed to chair and chair to bed with modified independence using the least restrictive device within 7 day(s).  4.  Patient will ambulate with modified independence for 150 feet with the least restrictive device within 7 day(s).   5.  Patient will ascend/descend 4 stairs with one sided handrail(s) with modified independence within 7 day(s).   Outcome: Progressing   PHYSICAL THERAPY EVALUATION    Patient: Alissa Juarez (43 y.o. female)  Date: 1/27/2024  Primary Diagnosis: Diabetic ketoacidosis without coma associated with diabetes mellitus due to underlying condition ([facility]) [E08.10]       Precautions: Restrictions/Precautions: Bed Alarm                      ASSESSMENT :   DEFICITS/IMPAIRMENTS:   The patient is limited by decreased functional mobility, strength, cognition, balance, and unsteady gait following admission for DKA.  Patient presents with impaired cognition, very flat, minimally talking.  present and answered all questions. Patient with slowed processing, requiring additional time for all tasks. She required x 1 assist to get to the chair this date.      Based on the impairments listed above patient is functioning below her baseline, requiring assist for all mobility.    Patient will

## 2024-01-27 NOTE — PLAN OF CARE
Problem: Occupational Therapy - Adult  Goal: By Discharge: Performs self-care activities at highest level of function for planned discharge setting.  See evaluation for individualized goals.  Description: FUNCTIONAL STATUS PRIOR TO ADMISSION:  Pt was living at home with family, independent with ADLs and ILS and stated that she was using Rolator at times pt reports   ,  ,  ,  ,  ,  ,  ,  ,  ,  ,       HOME SUPPORT: Patient lived with  but didn't require assistance.    Occupational Therapy Goals:  Initiated 1/27/2024  1.  Patient will perform grooming standing  with Contact Guard Assist within 7 day(s).  2.  Patient will perform bathing standing with Contact Guard Assist within 7 day(s).  3.  Patient will perform lower body dressing with Contact Guard Assist within 7 day(s).  4.  Patient will perform toilet transfers with Contact Guard Assist  within 7 day(s).  5.  Patient will perform all aspects of toileting with [place] within 7 day(s).  6.  Patient will participate in upper extremity therapeutic exercise/activities with Supervision for 10 minutes within 7 day(s).    7.  Patient will utilize energy conservation techniques during functional activities with verbal cues within 7 day(s).    Outcome: Not Progressing   OCCUPATIONAL THERAPY EVALUATION    Patient: Alissa Juarez (43 y.o. female)  Date: 1/27/2024  Primary Diagnosis: Diabetic ketoacidosis without coma associated with diabetes mellitus due to underlying condition ([facility]) [E08.10]         Precautions: Bed Alarm                  ASSESSMENT :  The patient is limited by decreased functional mobility, independence in ADLs, strength, activity tolerance, endurance, balance.    Based on the impairments listed above pt was living at home with family and stated that she was able to bathe and dress self with no assist.  She stated that she was using Rolator at times.  Pt was lethargic, and with encouragement she did talk minimal amount.  She was able to

## 2024-01-27 NOTE — CONSULTS
Orders received, chart reviewed and patient evaluated by occupational therapy. Pending progression with skilled acute occupational therapy, recommend:  Therapy 2 days/week in the home with assist from     Recommend with nursing patient to complete as able in order to maintain strength, endurance and independence: OOB to chair 3x/day, ADLs with supervision/setup and mobilizing to the bathroom for toileting with 1 assist. Thank you for your assistance.     Full evaluation to follow.

## 2024-01-27 NOTE — PLAN OF CARE
Problem: Discharge Planning  Goal: Discharge to home or other facility with appropriate resources  1/27/2024 0114 by Waylon Aden RN  Outcome: Progressing  1/26/2024 1822 by Argentina Oliveros RN  Outcome: Progressing     Problem: Pain  Goal: Verbalizes/displays adequate comfort level or baseline comfort level  1/27/2024 0114 by Waylon Aden RN  Outcome: Progressing  1/26/2024 1822 by Argentina Oliveros RN  Outcome: Progressing     Problem: Skin/Tissue Integrity  Goal: Absence of new skin breakdown  Description: 1.  Monitor for areas of redness and/or skin breakdown  2.  Assess vascular access sites hourly  3.  Every 4-6 hours minimum:  Change oxygen saturation probe site  4.  Every 4-6 hours:  If on nasal continuous positive airway pressure, respiratory therapy assess nares and determine need for appliance change or resting period.  1/27/2024 0114 by Waylon Aden RN  Outcome: Progressing  1/26/2024 1822 by Argentina Oliveros RN  Outcome: Progressing     Problem: Safety - Adult  Goal: Free from fall injury  1/27/2024 0114 by Waylon Aden RN  Outcome: Progressing  1/26/2024 1822 by Argentina Oliveros RN  Outcome: Progressing

## 2024-01-28 LAB
ANION GAP SERPL CALC-SCNC: 11 MMOL/L (ref 5–15)
BACTERIA SPEC CULT: NORMAL
BASOPHILS # BLD: 0 K/UL (ref 0–0.1)
BASOPHILS NFR BLD: 0 % (ref 0–1)
BUN SERPL-MCNC: 15 MG/DL (ref 6–20)
BUN/CREAT SERPL: 15 (ref 12–20)
CALCIUM SERPL-MCNC: 9.3 MG/DL (ref 8.5–10.1)
CHLORIDE SERPL-SCNC: 104 MMOL/L (ref 97–108)
CO2 SERPL-SCNC: 21 MMOL/L (ref 21–32)
CREAT SERPL-MCNC: 1.02 MG/DL (ref 0.55–1.02)
DIFFERENTIAL METHOD BLD: ABNORMAL
EOSINOPHIL # BLD: 0 K/UL (ref 0–0.4)
EOSINOPHIL NFR BLD: 0 % (ref 0–7)
ERYTHROCYTE [DISTWIDTH] IN BLOOD BY AUTOMATED COUNT: 15.3 % (ref 11.5–14.5)
GLUCOSE BLD STRIP.AUTO-MCNC: 264 MG/DL (ref 65–117)
GLUCOSE BLD STRIP.AUTO-MCNC: 347 MG/DL (ref 65–117)
GLUCOSE BLD STRIP.AUTO-MCNC: 381 MG/DL (ref 65–117)
GLUCOSE BLD STRIP.AUTO-MCNC: 404 MG/DL (ref 65–117)
GLUCOSE BLD STRIP.AUTO-MCNC: 410 MG/DL (ref 65–117)
GLUCOSE BLD STRIP.AUTO-MCNC: 417 MG/DL (ref 65–117)
GLUCOSE SERPL-MCNC: 370 MG/DL (ref 65–100)
HCT VFR BLD AUTO: 31.5 % (ref 35–47)
HGB BLD-MCNC: 10.5 G/DL (ref 11.5–16)
IMM GRANULOCYTES # BLD AUTO: 0.1 K/UL (ref 0–0.04)
IMM GRANULOCYTES NFR BLD AUTO: 1 % (ref 0–0.5)
LACTATE SERPL-SCNC: 1 MMOL/L (ref 0.4–2)
LYMPHOCYTES # BLD: 3.2 K/UL (ref 0.8–3.5)
LYMPHOCYTES NFR BLD: 29 % (ref 12–49)
MCH RBC QN AUTO: 29.2 PG (ref 26–34)
MCHC RBC AUTO-ENTMCNC: 33.3 G/DL (ref 30–36.5)
MCV RBC AUTO: 87.7 FL (ref 80–99)
MONOCYTES # BLD: 1 K/UL (ref 0–1)
MONOCYTES NFR BLD: 9 % (ref 5–13)
NEUTS SEG # BLD: 6.6 K/UL (ref 1.8–8)
NEUTS SEG NFR BLD: 61 % (ref 32–75)
NRBC # BLD: 0 K/UL (ref 0–0.01)
NRBC BLD-RTO: 0 PER 100 WBC
PHOSPHATE SERPL-MCNC: 2.3 MG/DL (ref 2.6–4.7)
PLATELET # BLD AUTO: 212 K/UL (ref 150–400)
PMV BLD AUTO: 11.7 FL (ref 8.9–12.9)
POTASSIUM SERPL-SCNC: 3.9 MMOL/L (ref 3.5–5.1)
RBC # BLD AUTO: 3.59 M/UL (ref 3.8–5.2)
SERVICE CMNT-IMP: ABNORMAL
SERVICE CMNT-IMP: NORMAL
SODIUM SERPL-SCNC: 136 MMOL/L (ref 136–145)
TSH SERPL DL<=0.05 MIU/L-ACNC: 0.51 UIU/ML (ref 0.45–4.5)
WBC # BLD AUTO: 10.9 K/UL (ref 3.6–11)

## 2024-01-28 PROCEDURE — 6360000002 HC RX W HCPCS: Performed by: INTERNAL MEDICINE

## 2024-01-28 PROCEDURE — C9113 INJ PANTOPRAZOLE SODIUM, VIA: HCPCS | Performed by: INTERNAL MEDICINE

## 2024-01-28 PROCEDURE — 84100 ASSAY OF PHOSPHORUS: CPT

## 2024-01-28 PROCEDURE — 2060000000 HC ICU INTERMEDIATE R&B

## 2024-01-28 PROCEDURE — 85025 COMPLETE CBC W/AUTO DIFF WBC: CPT

## 2024-01-28 PROCEDURE — 6370000000 HC RX 637 (ALT 250 FOR IP): Performed by: INTERNAL MEDICINE

## 2024-01-28 PROCEDURE — 2580000003 HC RX 258: Performed by: INTERNAL MEDICINE

## 2024-01-28 PROCEDURE — A4216 STERILE WATER/SALINE, 10 ML: HCPCS | Performed by: INTERNAL MEDICINE

## 2024-01-28 PROCEDURE — 80048 BASIC METABOLIC PNL TOTAL CA: CPT

## 2024-01-28 PROCEDURE — 82962 GLUCOSE BLOOD TEST: CPT

## 2024-01-28 PROCEDURE — 36415 COLL VENOUS BLD VENIPUNCTURE: CPT

## 2024-01-28 PROCEDURE — 83605 ASSAY OF LACTIC ACID: CPT

## 2024-01-28 RX ORDER — INSULIN LISPRO 100 [IU]/ML
4 INJECTION, SOLUTION INTRAVENOUS; SUBCUTANEOUS
Status: DISCONTINUED | OUTPATIENT
Start: 2024-01-28 | End: 2024-01-29

## 2024-01-28 RX ORDER — INSULIN LISPRO 100 [IU]/ML
4 INJECTION, SOLUTION INTRAVENOUS; SUBCUTANEOUS ONCE
Status: DISCONTINUED | OUTPATIENT
Start: 2024-01-28 | End: 2024-01-28 | Stop reason: SDUPTHER

## 2024-01-28 RX ORDER — INSULIN GLARGINE 100 [IU]/ML
40 INJECTION, SOLUTION SUBCUTANEOUS DAILY
Status: DISCONTINUED | OUTPATIENT
Start: 2024-01-28 | End: 2024-01-29

## 2024-01-28 RX ADMIN — ENOXAPARIN SODIUM 30 MG: 100 INJECTION SUBCUTANEOUS at 21:31

## 2024-01-28 RX ADMIN — PANTOPRAZOLE SODIUM 40 MG: 40 INJECTION, POWDER, FOR SOLUTION INTRAVENOUS at 09:30

## 2024-01-28 RX ADMIN — SODIUM CHLORIDE 1000 MG: 900 INJECTION INTRAVENOUS at 13:08

## 2024-01-28 RX ADMIN — ENOXAPARIN SODIUM 30 MG: 100 INJECTION SUBCUTANEOUS at 09:30

## 2024-01-28 RX ADMIN — INSULIN GLARGINE 40 UNITS: 100 INJECTION, SOLUTION SUBCUTANEOUS at 09:29

## 2024-01-28 RX ADMIN — LEVOTHYROXINE SODIUM 100 MCG: 0.1 TABLET ORAL at 09:30

## 2024-01-28 RX ADMIN — INSULIN LISPRO 4 UNITS: 100 INJECTION, SOLUTION INTRAVENOUS; SUBCUTANEOUS at 09:29

## 2024-01-28 RX ADMIN — DIBASIC SODIUM PHOSPHATE, MONOBASIC POTASSIUM PHOSPHATE AND MONOBASIC SODIUM PHOSPHATE 1 TABLET: 852; 155; 130 TABLET ORAL at 09:29

## 2024-01-28 RX ADMIN — INSULIN LISPRO 4 UNITS: 100 INJECTION, SOLUTION INTRAVENOUS; SUBCUTANEOUS at 13:02

## 2024-01-28 RX ADMIN — SODIUM CHLORIDE: 9 INJECTION, SOLUTION INTRAVENOUS at 13:45

## 2024-01-28 RX ADMIN — INSULIN LISPRO 4 UNITS: 100 INJECTION, SOLUTION INTRAVENOUS; SUBCUTANEOUS at 09:28

## 2024-01-28 RX ADMIN — ATORVASTATIN CALCIUM 20 MG: 20 TABLET, FILM COATED ORAL at 09:29

## 2024-01-28 RX ADMIN — INSULIN LISPRO 3 UNITS: 100 INJECTION, SOLUTION INTRAVENOUS; SUBCUTANEOUS at 17:12

## 2024-01-28 RX ADMIN — INSULIN LISPRO 4 UNITS: 100 INJECTION, SOLUTION INTRAVENOUS; SUBCUTANEOUS at 17:12

## 2024-01-28 RX ADMIN — DIBASIC SODIUM PHOSPHATE, MONOBASIC POTASSIUM PHOSPHATE AND MONOBASIC SODIUM PHOSPHATE 1 TABLET: 852; 155; 130 TABLET ORAL at 21:32

## 2024-01-28 RX ADMIN — DIBASIC SODIUM PHOSPHATE, MONOBASIC POTASSIUM PHOSPHATE AND MONOBASIC SODIUM PHOSPHATE 1 TABLET: 852; 155; 130 TABLET ORAL at 15:26

## 2024-01-28 RX ADMIN — SODIUM CHLORIDE, PRESERVATIVE FREE 10 ML: 5 INJECTION INTRAVENOUS at 21:37

## 2024-01-28 RX ADMIN — SODIUM CHLORIDE, PRESERVATIVE FREE 10 ML: 5 INJECTION INTRAVENOUS at 09:37

## 2024-01-28 NOTE — PLAN OF CARE
Problem: Occupational Therapy - Adult  Goal: By Discharge: Performs self-care activities at highest level of function for planned discharge setting.  See evaluation for individualized goals.  Description: FUNCTIONAL STATUS PRIOR TO ADMISSION:  Pt was living at home with family, independent with ADLs and ILS and stated that she was using Rolator at times pt reports   ,  ,  ,  ,  ,  ,  ,  ,  ,  ,       HOME SUPPORT: Patient lived with  but didn't require assistance.    Occupational Therapy Goals:  Initiated 1/27/2024  1.  Patient will perform grooming standing  with Contact Guard Assist within 7 day(s).  2.  Patient will perform bathing standing with Contact Guard Assist within 7 day(s).  3.  Patient will perform lower body dressing with Contact Guard Assist within 7 day(s).  4.  Patient will perform toilet transfers with Contact Guard Assist  within 7 day(s).  5.  Patient will perform all aspects of toileting with [place] within 7 day(s).  6.  Patient will participate in upper extremity therapeutic exercise/activities with Supervision for 10 minutes within 7 day(s).    7.  Patient will utilize energy conservation techniques during functional activities with verbal cues within 7 day(s).    1/27/2024 1322 by Dee Villatoro, OT  Outcome: Not Progressing     Problem: Discharge Planning  Goal: Discharge to home or other facility with appropriate resources  Outcome: Progressing     Problem: Pain  Goal: Verbalizes/displays adequate comfort level or baseline comfort level  Outcome: Progressing     Problem: Skin/Tissue Integrity  Goal: Absence of new skin breakdown  Description: 1.  Monitor for areas of redness and/or skin breakdown  2.  Assess vascular access sites hourly  3.  Every 4-6 hours minimum:  Change oxygen saturation probe site  4.  Every 4-6 hours:  If on nasal continuous positive airway pressure, respiratory therapy assess nares and determine need for appliance change or resting period.  Outcome:

## 2024-01-28 NOTE — PLAN OF CARE
Problem: Pain  Goal: Verbalizes/displays adequate comfort level or baseline comfort level  1/28/2024 1016 by Dorothy Tavarez RN  Outcome: Progressing  Flowsheets (Taken 1/28/2024 1016)  Verbalizes/displays adequate comfort level or baseline comfort level: Encourage patient to monitor pain and request assistance     Problem: Safety - Adult  Goal: Free from fall injury  1/28/2024 1016 by Dorothy Tavarez RN  Outcome: Progressing  Flowsheets (Taken 1/28/2024 1016)  Free From Fall Injury: Instruct family/caregiver on patient safety

## 2024-01-28 NOTE — CARE COORDINATION
Care Management Initial Assessment       RUR: 10%-\"Low Risk\"  Readmission? No  1st IM letter given? Yes - 1/26/24 1st  letter given: No     Transition of Care Plan:    RUR: 10%-\"Low Risk\"  Prior Level of Functioning: Independent in her ADLs and IADLs  Disposition: Home with Home Health Services-[facility] the patient for home health services   If SNF or IPR: Date FOC offered: N/A  Follow up appointments: PCP & Specialists as indicated   DME needed: The patient owns a rollator, shower chair, and a hand-held shower head  Transportation at discharge: The patient's  will transport her home on d/c  IM/IMM Medicare/ letter given: To be given prior to d/c   Is patient a [place] and connected with [facility]? N/A   If yes, was [place] transfer form completed and [facility] notified?   Caregiver Contact: Sawyer Juarez, , Phone: [phone]  Discharge Caregiver contacted prior to discharge? CM spoke to the patient's  at bedside   Care Conference needed? No   Barriers to discharge: Pending medical stability     The patient uses the [facility] at [facility] and [facility]. She had  services several years ago when she had to go home on IV abx but she could not remember the name of the agency. She has never been to a SNF/IPR facility in the past.        01/28/24 1311   Service Assessment   Patient Orientation Alert and Oriented   Cognition Alert   History Provided By Patient;Spouse   Primary Caregiver Self   Support Systems Spouse/Significant Other;Children  (The patient lives with her . She has 1 30 y/o daughter that lives locally)   PCP Verified by CM Yes   Last Visit to PCP Within last 3 months   Prior Functional Level Independent in ADLs/IADLs   Current Functional Level Assistance with the following:;Mobility   Can patient return to prior living arrangement Yes   Family able to assist with home care needs: Yes   Would you like for me to discuss the discharge plan

## 2024-01-29 LAB
ANION GAP BLD CALC-SCNC: ABNORMAL MMOL/L (ref 10–20)
ANION GAP SERPL CALC-SCNC: 8 MMOL/L (ref 5–15)
BUN SERPL-MCNC: 9 MG/DL (ref 6–20)
BUN/CREAT SERPL: 11 (ref 12–20)
CALCIUM SERPL-MCNC: 8.5 MG/DL (ref 8.5–10.1)
CHLORIDE SERPL-SCNC: 106 MMOL/L (ref 97–108)
CO2 BLD-SCNC: 7.8 MMOL/L (ref 21–32)
CO2 SERPL-SCNC: 24 MMOL/L (ref 21–32)
CREAT SERPL-MCNC: 0.8 MG/DL (ref 0.55–1.02)
GLUCOSE BLD STRIP.AUTO-MCNC: 222 MG/DL (ref 65–117)
GLUCOSE BLD STRIP.AUTO-MCNC: 238 MG/DL (ref 65–117)
GLUCOSE BLD STRIP.AUTO-MCNC: 263 MG/DL (ref 65–117)
GLUCOSE BLD STRIP.AUTO-MCNC: 305 MG/DL (ref 65–117)
GLUCOSE SERPL-MCNC: 308 MG/DL (ref 65–100)
MAGNESIUM SERPL-MCNC: 1.8 MG/DL (ref 1.6–2.4)
POTASSIUM SERPL-SCNC: 3.1 MMOL/L (ref 3.5–5.1)
SERVICE CMNT-IMP: ABNORMAL
SODIUM SERPL-SCNC: 138 MMOL/L (ref 136–145)

## 2024-01-29 PROCEDURE — 1100000003 HC PRIVATE W/ TELEMETRY

## 2024-01-29 PROCEDURE — 6370000000 HC RX 637 (ALT 250 FOR IP): Performed by: INTERNAL MEDICINE

## 2024-01-29 PROCEDURE — 83735 ASSAY OF MAGNESIUM: CPT

## 2024-01-29 PROCEDURE — 6370000000 HC RX 637 (ALT 250 FOR IP): Performed by: NURSE PRACTITIONER

## 2024-01-29 PROCEDURE — 6370000000 HC RX 637 (ALT 250 FOR IP)

## 2024-01-29 PROCEDURE — 36415 COLL VENOUS BLD VENIPUNCTURE: CPT

## 2024-01-29 PROCEDURE — 2580000003 HC RX 258: Performed by: INTERNAL MEDICINE

## 2024-01-29 PROCEDURE — A4216 STERILE WATER/SALINE, 10 ML: HCPCS | Performed by: INTERNAL MEDICINE

## 2024-01-29 PROCEDURE — 99231 SBSQ HOSP IP/OBS SF/LOW 25: CPT

## 2024-01-29 PROCEDURE — 6360000002 HC RX W HCPCS: Performed by: INTERNAL MEDICINE

## 2024-01-29 PROCEDURE — 82962 GLUCOSE BLOOD TEST: CPT

## 2024-01-29 PROCEDURE — C9113 INJ PANTOPRAZOLE SODIUM, VIA: HCPCS | Performed by: INTERNAL MEDICINE

## 2024-01-29 PROCEDURE — 80048 BASIC METABOLIC PNL TOTAL CA: CPT

## 2024-01-29 RX ORDER — TORSEMIDE 20 MG/1
40 TABLET ORAL DAILY
Status: DISCONTINUED | OUTPATIENT
Start: 2024-01-30 | End: 2024-01-30 | Stop reason: HOSPADM

## 2024-01-29 RX ORDER — INSULIN GLARGINE 100 [IU]/ML
56 INJECTION, SOLUTION SUBCUTANEOUS DAILY
Status: DISCONTINUED | OUTPATIENT
Start: 2024-01-29 | End: 2024-01-30 | Stop reason: HOSPADM

## 2024-01-29 RX ORDER — POTASSIUM CHLORIDE 20 MEQ/1
40 TABLET, EXTENDED RELEASE ORAL EVERY 4 HOURS
Status: COMPLETED | OUTPATIENT
Start: 2024-01-29 | End: 2024-01-29

## 2024-01-29 RX ORDER — PANTOPRAZOLE SODIUM 40 MG/1
40 TABLET, DELAYED RELEASE ORAL
Status: DISCONTINUED | OUTPATIENT
Start: 2024-01-30 | End: 2024-01-30 | Stop reason: HOSPADM

## 2024-01-29 RX ORDER — INSULIN LISPRO 100 [IU]/ML
5 INJECTION, SOLUTION INTRAVENOUS; SUBCUTANEOUS
Status: DISCONTINUED | OUTPATIENT
Start: 2024-01-29 | End: 2024-01-29

## 2024-01-29 RX ORDER — INSULIN LISPRO 100 [IU]/ML
8 INJECTION, SOLUTION INTRAVENOUS; SUBCUTANEOUS
Status: DISCONTINUED | OUTPATIENT
Start: 2024-01-29 | End: 2024-01-30

## 2024-01-29 RX ORDER — INSULIN GLARGINE 100 [IU]/ML
45 INJECTION, SOLUTION SUBCUTANEOUS DAILY
Status: DISCONTINUED | OUTPATIENT
Start: 2024-01-29 | End: 2024-01-29

## 2024-01-29 RX ADMIN — POTASSIUM CHLORIDE 40 MEQ: 1500 TABLET, EXTENDED RELEASE ORAL at 09:02

## 2024-01-29 RX ADMIN — NYSTATIN 500000 UNITS: 100000 SUSPENSION ORAL at 23:45

## 2024-01-29 RX ADMIN — INSULIN GLARGINE 56 UNITS: 100 INJECTION, SOLUTION SUBCUTANEOUS at 09:03

## 2024-01-29 RX ADMIN — ENOXAPARIN SODIUM 30 MG: 100 INJECTION SUBCUTANEOUS at 09:02

## 2024-01-29 RX ADMIN — SODIUM CHLORIDE, PRESERVATIVE FREE 10 ML: 5 INJECTION INTRAVENOUS at 22:28

## 2024-01-29 RX ADMIN — ENOXAPARIN SODIUM 30 MG: 100 INJECTION SUBCUTANEOUS at 20:53

## 2024-01-29 RX ADMIN — ATORVASTATIN CALCIUM 20 MG: 20 TABLET, FILM COATED ORAL at 09:02

## 2024-01-29 RX ADMIN — INSULIN LISPRO 8 UNITS: 100 INJECTION, SOLUTION INTRAVENOUS; SUBCUTANEOUS at 18:07

## 2024-01-29 RX ADMIN — LEVOTHYROXINE SODIUM 100 MCG: 0.1 TABLET ORAL at 09:06

## 2024-01-29 RX ADMIN — SODIUM CHLORIDE: 9 INJECTION, SOLUTION INTRAVENOUS at 03:33

## 2024-01-29 RX ADMIN — INSULIN LISPRO 2 UNITS: 100 INJECTION, SOLUTION INTRAVENOUS; SUBCUTANEOUS at 12:51

## 2024-01-29 RX ADMIN — INSULIN LISPRO 5 UNITS: 100 INJECTION, SOLUTION INTRAVENOUS; SUBCUTANEOUS at 12:51

## 2024-01-29 RX ADMIN — POTASSIUM CHLORIDE 40 MEQ: 1500 TABLET, EXTENDED RELEASE ORAL at 12:51

## 2024-01-29 RX ADMIN — SODIUM CHLORIDE, PRESERVATIVE FREE 10 ML: 5 INJECTION INTRAVENOUS at 09:04

## 2024-01-29 RX ADMIN — PANTOPRAZOLE SODIUM 40 MG: 40 INJECTION, POWDER, FOR SOLUTION INTRAVENOUS at 09:02

## 2024-01-29 RX ADMIN — INSULIN LISPRO 5 UNITS: 100 INJECTION, SOLUTION INTRAVENOUS; SUBCUTANEOUS at 09:02

## 2024-01-29 RX ADMIN — PHENOL 1 SPRAY: 1.5 LIQUID ORAL at 23:45

## 2024-01-29 RX ADMIN — INSULIN LISPRO 3 UNITS: 100 INJECTION, SOLUTION INTRAVENOUS; SUBCUTANEOUS at 09:03

## 2024-01-29 RX ADMIN — INSULIN LISPRO 1 UNITS: 100 INJECTION, SOLUTION INTRAVENOUS; SUBCUTANEOUS at 18:06

## 2024-01-29 NOTE — PLAN OF CARE
Problem: Discharge Planning  Goal: Discharge to home or other facility with appropriate resources  Outcome: Progressing  Flowsheets (Taken 1/29/2024 0730)  Discharge to home or other facility with appropriate resources:   Identify barriers to discharge with patient and caregiver   Arrange for needed discharge resources and transportation as appropriate     Problem: Pain  Goal: Verbalizes/displays adequate comfort level or baseline comfort level  Outcome: Progressing  Flowsheets (Taken 1/29/2024 0730)  Verbalizes/displays adequate comfort level or baseline comfort level:   Encourage patient to monitor pain and request assistance   Assess pain using appropriate pain scale     Problem: Skin/Tissue Integrity  Goal: Absence of new skin breakdown  Description: 1.  Monitor for areas of redness and/or skin breakdown  2.  Assess vascular access sites hourly  3.  Every 4-6 hours minimum:  Change oxygen saturation probe site  4.  Every 4-6 hours:  If on nasal continuous positive airway pressure, respiratory therapy assess nares and determine need for appliance change or resting period.  Outcome: Progressing     Problem: ABCDS Injury Assessment  Goal: Absence of physical injury  Outcome: Progressing

## 2024-01-29 NOTE — PLAN OF CARE
Problem: Discharge Planning  Goal: Discharge to home or other facility with appropriate resources  1/29/2024 1826 by Heather Dickerson RN  Outcome: Progressing  1/29/2024 0901 by Lesly Almanza RN  Outcome: Progressing  Flowsheets (Taken 1/29/2024 0730)  Discharge to home or other facility with appropriate resources:   Identify barriers to discharge with patient and caregiver   Arrange for needed discharge resources and transportation as appropriate     Problem: Pain  Goal: Verbalizes/displays adequate comfort level or baseline comfort level  1/29/2024 1826 by Heather Dickerson RN  Outcome: Progressing  1/29/2024 0901 by Lesly Almanza RN  Outcome: Progressing  Flowsheets (Taken 1/29/2024 0730)  Verbalizes/displays adequate comfort level or baseline comfort level:   Encourage patient to monitor pain and request assistance   Assess pain using appropriate pain scale     Problem: Skin/Tissue Integrity  Goal: Absence of new skin breakdown  Description: 1.  Monitor for areas of redness and/or skin breakdown  2.  Assess vascular access sites hourly  3.  Every 4-6 hours minimum:  Change oxygen saturation probe site  4.  Every 4-6 hours:  If on nasal continuous positive airway pressure, respiratory therapy assess nares and determine need for appliance change or resting period.  1/29/2024 1826 by Heather Dickerson RN  Outcome: Progressing  1/29/2024 0901 by Lesly Almanza RN  Outcome: Progressing     Problem: Safety - Adult  Goal: Free from fall injury  1/29/2024 1826 by Heather Dickerson RN  Outcome: Progressing  1/29/2024 0901 by Lesly Almanza RN  Outcome: Progressing     Problem: ABCDS Injury Assessment  Goal: Absence of physical injury  1/29/2024 1826 by Heather Dickerson RN  Outcome: Progressing  1/29/2024 0901 by Lesly Almanza RN  Outcome: Progressing     Problem: Chronic Conditions and Co-morbidities  Goal: Patient's chronic conditions and co-morbidity symptoms are monitored and maintained or improved  Outcome: Progressing

## 2024-01-30 ENCOUNTER — TELEPHONE (OUTPATIENT)
Age: 44
End: 2024-01-30

## 2024-01-30 VITALS
RESPIRATION RATE: 18 BRPM | HEIGHT: 68 IN | DIASTOLIC BLOOD PRESSURE: 79 MMHG | WEIGHT: 252.87 LBS | TEMPERATURE: 98.2 F | OXYGEN SATURATION: 100 % | SYSTOLIC BLOOD PRESSURE: 119 MMHG | HEART RATE: 75 BPM | BODY MASS INDEX: 38.32 KG/M2

## 2024-01-30 LAB
ANION GAP SERPL CALC-SCNC: 6 MMOL/L (ref 5–15)
BASOPHILS # BLD: 0 K/UL (ref 0–0.1)
BASOPHILS NFR BLD: 0 % (ref 0–1)
BUN SERPL-MCNC: 7 MG/DL (ref 6–20)
BUN/CREAT SERPL: 10 (ref 12–20)
CALCIUM SERPL-MCNC: 8.4 MG/DL (ref 8.5–10.1)
CHLORIDE SERPL-SCNC: 110 MMOL/L (ref 97–108)
CO2 SERPL-SCNC: 23 MMOL/L (ref 21–32)
CREAT SERPL-MCNC: 0.72 MG/DL (ref 0.55–1.02)
DIFFERENTIAL METHOD BLD: ABNORMAL
EOSINOPHIL # BLD: 0 K/UL (ref 0–0.4)
EOSINOPHIL NFR BLD: 0 % (ref 0–7)
ERYTHROCYTE [DISTWIDTH] IN BLOOD BY AUTOMATED COUNT: 15 % (ref 11.5–14.5)
GLUCOSE BLD STRIP.AUTO-MCNC: 194 MG/DL (ref 65–117)
GLUCOSE BLD STRIP.AUTO-MCNC: 261 MG/DL (ref 65–117)
GLUCOSE SERPL-MCNC: 234 MG/DL (ref 65–100)
HCT VFR BLD AUTO: 28.2 % (ref 35–47)
HGB BLD-MCNC: 9.5 G/DL (ref 11.5–16)
IMM GRANULOCYTES # BLD AUTO: 0.1 K/UL (ref 0–0.04)
IMM GRANULOCYTES NFR BLD AUTO: 1 % (ref 0–0.5)
LYMPHOCYTES # BLD: 4.5 K/UL (ref 0.8–3.5)
LYMPHOCYTES NFR BLD: 47 % (ref 12–49)
MAGNESIUM SERPL-MCNC: 1.8 MG/DL (ref 1.6–2.4)
MCH RBC QN AUTO: 29.1 PG (ref 26–34)
MCHC RBC AUTO-ENTMCNC: 33.7 G/DL (ref 30–36.5)
MCV RBC AUTO: 86.2 FL (ref 80–99)
MONOCYTES # BLD: 0.9 K/UL (ref 0–1)
MONOCYTES NFR BLD: 9 % (ref 5–13)
NEUTS SEG # BLD: 4.2 K/UL (ref 1.8–8)
NEUTS SEG NFR BLD: 43 % (ref 32–75)
NRBC # BLD: 0 K/UL (ref 0–0.01)
NRBC BLD-RTO: 0 PER 100 WBC
PHOSPHATE SERPL-MCNC: 2.1 MG/DL (ref 2.6–4.7)
PLATELET # BLD AUTO: 229 K/UL (ref 150–400)
PMV BLD AUTO: 11.4 FL (ref 8.9–12.9)
POTASSIUM SERPL-SCNC: 3.6 MMOL/L (ref 3.5–5.1)
RBC # BLD AUTO: 3.27 M/UL (ref 3.8–5.2)
SERVICE CMNT-IMP: ABNORMAL
SERVICE CMNT-IMP: ABNORMAL
SODIUM SERPL-SCNC: 139 MMOL/L (ref 136–145)
WBC # BLD AUTO: 9.6 K/UL (ref 3.6–11)

## 2024-01-30 PROCEDURE — 6370000000 HC RX 637 (ALT 250 FOR IP)

## 2024-01-30 PROCEDURE — 6370000000 HC RX 637 (ALT 250 FOR IP): Performed by: INTERNAL MEDICINE

## 2024-01-30 PROCEDURE — 84100 ASSAY OF PHOSPHORUS: CPT

## 2024-01-30 PROCEDURE — 99231 SBSQ HOSP IP/OBS SF/LOW 25: CPT

## 2024-01-30 PROCEDURE — 82962 GLUCOSE BLOOD TEST: CPT

## 2024-01-30 PROCEDURE — 6370000000 HC RX 637 (ALT 250 FOR IP): Performed by: NURSE PRACTITIONER

## 2024-01-30 PROCEDURE — 85025 COMPLETE CBC W/AUTO DIFF WBC: CPT

## 2024-01-30 PROCEDURE — 6360000002 HC RX W HCPCS: Performed by: INTERNAL MEDICINE

## 2024-01-30 PROCEDURE — 83735 ASSAY OF MAGNESIUM: CPT

## 2024-01-30 PROCEDURE — 36415 COLL VENOUS BLD VENIPUNCTURE: CPT

## 2024-01-30 PROCEDURE — 80048 BASIC METABOLIC PNL TOTAL CA: CPT

## 2024-01-30 PROCEDURE — 97116 GAIT TRAINING THERAPY: CPT | Performed by: PHYSICAL THERAPIST

## 2024-01-30 PROCEDURE — 2580000003 HC RX 258: Performed by: INTERNAL MEDICINE

## 2024-01-30 RX ORDER — INSULIN LISPRO 100 [IU]/ML
11 INJECTION, SOLUTION INTRAVENOUS; SUBCUTANEOUS
Status: DISCONTINUED | OUTPATIENT
Start: 2024-01-30 | End: 2024-01-30 | Stop reason: HOSPADM

## 2024-01-30 RX ORDER — INSULIN LISPRO 100 [IU]/ML
11 INJECTION, SOLUTION INTRAVENOUS; SUBCUTANEOUS 3 TIMES DAILY
Qty: 30 ML | Refills: 0 | Status: SHIPPED | OUTPATIENT
Start: 2024-01-30

## 2024-01-30 RX ORDER — PEN NEEDLE, DIABETIC 31 GX5/16"
1 NEEDLE, DISPOSABLE MISCELLANEOUS 4 TIMES DAILY
Qty: 100 EACH | Refills: 1 | Status: SHIPPED | OUTPATIENT
Start: 2024-01-30 | End: 2024-04-29

## 2024-01-30 RX ADMIN — INSULIN LISPRO 2 UNITS: 100 INJECTION, SOLUTION INTRAVENOUS; SUBCUTANEOUS at 09:16

## 2024-01-30 RX ADMIN — SODIUM CHLORIDE, PRESERVATIVE FREE 10 ML: 5 INJECTION INTRAVENOUS at 09:18

## 2024-01-30 RX ADMIN — LEVOTHYROXINE SODIUM 100 MCG: 0.1 TABLET ORAL at 09:17

## 2024-01-30 RX ADMIN — PANTOPRAZOLE SODIUM 40 MG: 40 TABLET, DELAYED RELEASE ORAL at 06:30

## 2024-01-30 RX ADMIN — ENOXAPARIN SODIUM 30 MG: 100 INJECTION SUBCUTANEOUS at 09:17

## 2024-01-30 RX ADMIN — INSULIN GLARGINE 56 UNITS: 100 INJECTION, SOLUTION SUBCUTANEOUS at 09:16

## 2024-01-30 RX ADMIN — NYSTATIN 500000 UNITS: 100000 SUSPENSION ORAL at 09:17

## 2024-01-30 RX ADMIN — INSULIN LISPRO 11 UNITS: 100 INJECTION, SOLUTION INTRAVENOUS; SUBCUTANEOUS at 12:30

## 2024-01-30 RX ADMIN — TORSEMIDE 40 MG: 20 TABLET ORAL at 09:17

## 2024-01-30 RX ADMIN — ATORVASTATIN CALCIUM 20 MG: 20 TABLET, FILM COATED ORAL at 09:18

## 2024-01-30 RX ADMIN — INSULIN LISPRO 8 UNITS: 100 INJECTION, SOLUTION INTRAVENOUS; SUBCUTANEOUS at 09:10

## 2024-01-30 RX ADMIN — DIBASIC SODIUM PHOSPHATE, MONOBASIC POTASSIUM PHOSPHATE AND MONOBASIC SODIUM PHOSPHATE 1 TABLET: 852; 155; 130 TABLET ORAL at 09:18

## 2024-01-30 NOTE — DISCHARGE SUMMARY
Discharge Summary    Name: Alissa Juarez  [id number]  YOB: 1980 (Age: 43 y.o.)   Date of Admission: 1/26/2024  Date of Discharge: 1/30/2024  Attending Physician: Jody att. providers found    Discharge Diagnosis:   DKA  Uncontrolled DM  Acute metabolic encephalopathy  UTI ruled out  Acute urinary retention  Acute hypophosphatemia  Acute hypokalemia  Oral thrush  NICM  Hypothyroidism    Consultations:  IP CONSULT TO DIETITIAN  IP CONSULT TO DIABETES MANAGEMENT  IP CONSULT HOME HEALTH  IP CONSULT TO DIABETES MANAGEMENT      Brief Admission History/Reason for Admission Per Nataliia Paula MD:   Alissa Juarez is a 43 y.o.  female with PMHx significant for NICM, history of diabetes currently not on any medication, hypothyroidism, presented with several days of lethargy, difficulty ambulating, nausea vomiting and unintentional weight loss.  Noticed 40 pound weight loss in the past 1 month, patient has been feeling weak and tired for a few weeks now and went to PCP for the same and was diagnosed with COVID and completed Paxlovid on 1/18.  No further workup was done at the time.  Patient continues to be very lethargic and sleeping most of the time at home with difficulty ambulation due to weakness.  Patient monitor her weight on a daily basis because of her heart failure and noticed she has been losing weight for the past 1 month and lost around 40 pounds.  Patient had remote history of diabetes for which she was on Trulicity but was discontinued after blood sugar became normal.  Noticed to have nausea and vomiting for the past 2 days with poor oral intake     Brief Hospital Course by Main Problems:   DKA  Uncontrolled DM:  Hb1Ac >14  Anion gap closed  Diabetic management team consult  Discharged on lantus 56 units sc daily and 11 units premeal  Follow up with PCP In 1 week for insulin adjustment if needed     Acute metabolic encephalopathy:  Likely multifactorial DKA,

## 2024-01-30 NOTE — PLAN OF CARE
Problem: Physical Therapy - Adult  Goal: By Discharge: Performs mobility at highest level of function for planned discharge setting.  See evaluation for individualized goals.  Description: FUNCTIONAL STATUS PRIOR TO ADMISSION: Patient was modified independent using a rollator for functional mobility.    HOME SUPPORT PRIOR TO ADMISSION: The patient lived with her  but did not require assistance.    Physical Therapy Goals  Initiated 1/27/2024  1.  Patient will move from supine to sit and sit to supine, scoot up and down, and roll side to side in bed with modified independence within 7 day(s).    2.  Patient will perform sit to stand with modified independence within 7 day(s).  3.  Patient will transfer from bed to chair and chair to bed with modified independence using the least restrictive device within 7 day(s).  4.  Patient will ambulate with modified independence for 150 feet with the least restrictive device within 7 day(s).   5.  Patient will ascend/descend 4 stairs with one sided handrail(s) with modified independence within 7 day(s).   Outcome: Progressing   PHYSICAL THERAPY TREATMENT    Patient: Alissa Juarez (43 y.o. female)  Date: 1/30/2024  Diagnosis: Diabetic ketoacidosis without coma associated with diabetes mellitus due to underlying condition (HCC) [E08.10] Diabetic ketoacidosis without coma associated with diabetes mellitus due to underlying condition (HCC)      Precautions: Bed Alarm                      ASSESSMENT:  Patient continues to benefit from skilled PT services and is progressing towards goals. Upon arrival, patient up, ambulating around room.  Patient ambulated 125' further in [place] with supervision and no assistive device.  Patient with wide RED and decreased pace but no LOB.  Patient declined to practice stairs.  Continue to recommend HHPT at discharge.         PLAN:  Patient continues to benefit from skilled intervention to address the above impairments.  Continue treatment

## 2024-01-30 NOTE — CARE COORDINATION
Pt clear from CM standpoint.    Transition of Care Plan:    RUR: 10% \"low risk\"  Prior Level of Functioning: Independent with ADL's   Disposition: Home with [facility]   If SNF or IPR: Date FOC offered: N/A  Follow up appointments: PCP & Specialists as indicated  DME needed: None  Transportation at discharge: Family to transport pt home  IM/IMM Medicare/ letter given: 2nd IM given by CM on 1/30/24  Is patient a [place] and connected with [facility]? No  Caregiver Contact: Sawyer Juarez (spouse), [phone]  Discharge Caregiver contacted prior to discharge? Yes, pt contacting now  Care Conference needed? No   Barriers to discharge: None     1126 AM: Chart reviewed. CM met with pt at the bedside to discuss d/c plan. Pt to go home with [facility]. Pt spouse on the way to transport pt home. Pt clear from CM standpoint at this time.      01/30/24 1127   Services At/After Discharge   Transition of Care Consult (CM Consult) Discharge Planning   Internal Home Health No   Services At/After Discharge Home Health   [place] Resource Information Provided? No   Mode of Transport at Discharge Other (see comment)  (Pt spouse to transport pt home.)   Hospital Transport Time of Discharge 1200   Confirm Follow Up Transport Family   Condition of Participation: Discharge Planning   The Plan for Transition of Care is related to the following treatment goals: Return home independently   The Patient and/or Patient Representative was provided with a Choice of Provider? Patient   The Patient and/Or Patient Representative agree with the Discharge Plan? Yes     Karolyn Albert   Care Management   [phone]

## 2024-01-30 NOTE — DIABETES MGMT
[facility]  DIABETES MANAGEMENT CONSULT    Consulted by Provider for advanced nursing evaluation and care for inpatient blood glucose management.    Evaluation and Action Plan   This 43 year old female with a hx of Type 2 diabetes is admitted with nausea, vomiting, weight loss and extreme fatigue. DKA. The patient reportedly loss 40 lbs in past month. It is noted the patient was taken off diabetes medication ( Lantus and Humalog ) in a August 2023 office visit. The patient reportedly has a past hx of DKA. With an admission glucose of 1124 the patient was started on the insulin infusion. She was using 27 units per hour when I came to the patient. She will likely need to remain on the insulin infusion overnight. The patient should remain on the insulin infusion unitl anion gap is closed x2. She is less than 4 units insulin per hour, nausea and vomiting resolved and patient is alert. It is noted the patient had a recent dx of COVID, but no steroid uses noted. The patient will require basal insulin transitioning off the insulin infusion and will likely need at discharge. Attempted to speak with patient's spouse but no answer; message left.     Management Rationale Action Plan   Medication   Basal needs Using insulin infusion   Please read my note my evaluation and action plan above   Additional orders          Diabetes Discharge Plan   Medication  TBD   Referral  [x]        Outpatient diabetes education   Additional orders            Initial Presentation   Alissa Juarez is a 43 y.o. female admitted after experiencing .  LAB: Glucose 1124. Creatine 2.68. GFR 22.   CXR:Narrative & Impression  EXAM:  XR CHEST PORTABLE     INDICATION: Weakness.     COMPARISON: Chest x-ray 11/10/2022.     TECHNIQUE: Semiupright portable chest AP view     FINDINGS: Cardiac monitoring leads are noted. Pacemaker generator body projects  over the left chest wall with intact appearing lead traversing in 
      Outpatient diabetes education   Additional orders            Initial Presentation   Alissa Juarez is a 43 y.o. female admitted after experiencing .  LAB: Glucose 1124. Creatine 2.68. GFR 22.   CXR:Narrative & Impression  EXAM:  XR CHEST PORTABLE     INDICATION: Weakness.     COMPARISON: Chest x-ray 11/10/2022.     TECHNIQUE: Semiupright portable chest AP view     FINDINGS: Cardiac monitoring leads are noted. Pacemaker generator body projects  over the left chest wall with intact appearing lead traversing in expected  course. The cardiac silhouette is within normal limits. Mitral valve annular  prosthesis is noted. The pulmonary vasculature is within normal limits.      The lungs and pleural spaces are clear. The visualized bones and upper abdomen  are age-appropriate.     IMPRESSION:  No acute findings.            CT:Narrative & Impression  EXAM: CT HEAD WO CONTRAST     INDICATION: ams     COMPARISON: None.     CONTRAST: None.     TECHNIQUE: Unenhanced CT of the head was performed using 5 mm images. Brain and  bone windows were generated. Coronal and sagittal reformats. CT dose reduction  was achieved through use of a standardized protocol tailored for this  examination and automatic exposure control for dose modulation.       FINDINGS:  The ventricles and sulci are normal in size, shape and configuration. There is  no significant white matter disease. There is no intracranial hemorrhage,  extra-axial collection, or mass effect. The basilar cisterns are open. No CT  evidence of acute infarct.     The bone windows demonstrate no abnormalities. The visualized portions of the  paranasal sinuses and mastoid air cells are clear.     IMPRESSION:  No acute intracranial abnormality is identified.    HX:   Past Medical History:   Diagnosis Date    Congestive heart disease (HCC)     Diabetes (HCC)     Endocrine disease     Grave's Disease    Other ill-defined conditions(799.89)     graves    Other ill-defined 
  Medication  Use 56 units Lantus daily  Use 11 units Huamlog with each meal  Monitor and log BG's  Follow-up with outpatient provider for future diabetes management   Referral  [x]        Outpatient diabetes education   Additional orders            Initial Presentation   Alissa Juarez is a 43 y.o. female admitted after experiencing .  LAB: Glucose 1124. Creatine 2.68. GFR 22.   CXR:Narrative & Impression  EXAM:  XR CHEST PORTABLE     INDICATION: Weakness.     COMPARISON: Chest x-ray 11/10/2022.     TECHNIQUE: Semiupright portable chest AP view     FINDINGS: Cardiac monitoring leads are noted. Pacemaker generator body projects  over the left chest wall with intact appearing lead traversing in expected  course. The cardiac silhouette is within normal limits. Mitral valve annular  prosthesis is noted. The pulmonary vasculature is within normal limits.      The lungs and pleural spaces are clear. The visualized bones and upper abdomen  are age-appropriate.     IMPRESSION:  No acute findings.            CT:Narrative & Impression  EXAM: CT HEAD WO CONTRAST     INDICATION: ams     COMPARISON: None.     CONTRAST: None.     TECHNIQUE: Unenhanced CT of the head was performed using 5 mm images. Brain and  bone windows were generated. Coronal and sagittal reformats. CT dose reduction  was achieved through use of a standardized protocol tailored for this  examination and automatic exposure control for dose modulation.       FINDINGS:  The ventricles and sulci are normal in size, shape and configuration. There is  no significant white matter disease. There is no intracranial hemorrhage,  extra-axial collection, or mass effect. The basilar cisterns are open. No CT  evidence of acute infarct.     The bone windows demonstrate no abnormalities. The visualized portions of the  paranasal sinuses and mastoid air cells are clear.     IMPRESSION:  No acute intracranial abnormality is identified.    HX:   Past Medical History:

## 2024-01-30 NOTE — TELEPHONE ENCOUNTER
[facility] called to schedule a hospital f/u for patient.   Patient was admitted to them on 1/26 for diabetic ketoacidosis, and will be discharged today.   Please call patient to schedule them in next two week, I was unable to find anything.

## 2024-01-30 NOTE — PLAN OF CARE
Problem: Discharge Planning  Goal: Discharge to home or other facility with appropriate resources  1/30/2024 1417 by Heather Dickerson RN  Outcome: Progressing  1/30/2024 0944 by Heather Dickerson RN  Outcome: Progressing     Problem: Pain  Goal: Verbalizes/displays adequate comfort level or baseline comfort level  1/30/2024 1417 by Heather Dickerson RN  Outcome: Progressing  1/30/2024 0944 by Heather Dickerson RN  Outcome: Progressing     Problem: Skin/Tissue Integrity  Goal: Absence of new skin breakdown  Description: 1.  Monitor for areas of redness and/or skin breakdown  2.  Assess vascular access sites hourly  3.  Every 4-6 hours minimum:  Change oxygen saturation probe site  4.  Every 4-6 hours:  If on nasal continuous positive airway pressure, respiratory therapy assess nares and determine need for appliance change or resting period.  1/30/2024 1417 by Heather Dickerson RN  Outcome: Progressing  1/30/2024 0944 by Heather Dickerson RN  Outcome: Progressing     Problem: Safety - Adult  Goal: Free from fall injury  1/30/2024 1417 by Heather Dickerson RN  Outcome: Progressing  1/30/2024 0944 by Heather Dickerson RN  Outcome: Progressing     Problem: Physical Therapy - Adult  Goal: By Discharge: Performs mobility at highest level of function for planned discharge setting.  See evaluation for individualized goals.  Description: FUNCTIONAL STATUS PRIOR TO ADMISSION: Patient was modified independent using a rollator for functional mobility.    HOME SUPPORT PRIOR TO ADMISSION: The patient lived with her  but did not require assistance.    Physical Therapy Goals  Initiated 1/27/2024  1.  Patient will move from supine to sit and sit to supine, scoot up and down, and roll side to side in bed with modified independence within 7 day(s).    2.  Patient will perform sit to stand with modified independence within 7 day(s).  3.  Patient will transfer from bed to chair and chair to bed with modified independence using the least restrictive device

## 2024-01-30 NOTE — PLAN OF CARE
Problem: Physical Therapy - Adult  Goal: By Discharge: Performs mobility at highest level of function for planned discharge setting.  See evaluation for individualized goals.  Description: FUNCTIONAL STATUS PRIOR TO ADMISSION: Patient was modified independent using a rollator for functional mobility.    HOME SUPPORT PRIOR TO ADMISSION: The patient lived with her  but did not require assistance.    Physical Therapy Goals  Initiated 1/27/2024  1.  Patient will move from supine to sit and sit to supine, scoot up and down, and roll side to side in bed with modified independence within 7 day(s).    2.  Patient will perform sit to stand with modified independence within 7 day(s).  3.  Patient will transfer from bed to chair and chair to bed with modified independence using the least restrictive device within 7 day(s).  4.  Patient will ambulate with modified independence for 150 feet with the least restrictive device within 7 day(s).   5.  Patient will ascend/descend 4 stairs with one sided handrail(s) with modified independence within 7 day(s).   1/30/2024 1131 by Britany Whitt, PT  Outcome: Progressing

## 2024-01-30 NOTE — TELEPHONE ENCOUNTER
[facility] called asking for a call back. They need to know if patient PCP is willing to follow up with patient for home health purposes. Patient is being discharged today.

## 2024-01-31 ENCOUNTER — TELEPHONE (OUTPATIENT)
Age: 44
End: 2024-01-31

## 2024-01-31 NOTE — PROGRESS NOTES
Hospitalist Progress Note    NAME:   Alissa Juarez   : 1980   MRN: [id number]     Date/Time: 2024 2:41 PM  Patient PCP: Linda Monique APRN - NP    Estimated discharge date: 24  Barriers: clinical improvement.       Assessment / Plan:  DKA  Uncontrolled DM:  Hb1Ac >14  Anion gap closed  Started on lantus and lispro sliding scale this morning  Will monitor blood sugar and adjust lantus accordingly    Acute metabolic encephalopathy:  Likely multifactorial DKA, MAME, suspected infection  Will monitor mental status    Acute UTI:  Acute urinary retention:  Frank inserted on 24 for urinary retention  Continue ceftriaxone  Will follow urine culture    Acute hypophosphatemia:  Will replete phosphorus    NICM  Status post defibrillator  Hypertension  History of mitral valve repair  -last echo - 50-55%EF  Hold enteresto/torsemide  Pt volume depleted and with MAME  Cautious with IVF given CHF  Continue statin     Recent covid  Completed paxlovid as OP  Currently on RA  No need for steroids/remdesivir        Hypothyroidism  Continue Synthroid           Medical Decision Making:   I personally reviewed labs:cbc- white count trending up(on ceftriaxone for urine infection, will follow urine culture and blood culture)blood culture negative so far.   I personally reviewed imaging: CXR- no acute findings  I personally reviewed EKG:  Toxic drug monitoring:   Discussed case with: patient, patient's , RN        Code Status: full  DVT Prophylaxis:lovenox   GI Prophylaxis:    Subjective:     Chief Complaint / Reason for Physician Visit  Patient is lethargic,  by the bedside. States that she was able to eat a little bit today, is getting better compared to yesterday.       Objective:     VITALS:   Last 24hrs VS reviewed since prior progress note. Most recent are:  Patient Vitals for the past 24 hrs:   BP Temp Temp src Pulse Resp SpO2   24 1000 116/79 97.4 °F (36.3 °C) Oral (!) 101 25 
      Hospitalist Progress Note    NAME:   Alissa Juarez   : 1980   MRN: [id number]     Date/Time: 2024 1:09 PM  Patient PCP: Linda Monique APRN - NP    Estimated discharge date: 24  Barriers: clinical improvement. Improvement in blood glucose      Assessment / Plan:  DKA  Uncontrolled DM:  Hb1Ac >14  Anion gap closed    Blood sugar still elevated. Increase lantus to 56  units, increase pre meal to 5 units  Continue lispro sliding scale    Acute metabolic encephalopathy:  Likely multifactorial DKA, MAME,  Improved      Acute UTI rule out. Culture X 2 negative  Acute urinary retention:  Frank inserted on 24 for urinary retention  Completed 3 days of ceftriaxone  Urine culture no growth  Voiding trial.    Acute hypophosphatemia:  Will check phosphorus tomorrow      Acute hypokalemia:  Will replete potassium    NICM  Status post defibrillator  Hypertension  History of mitral valve repair  -last echo - 50-55%EF  Hold enteresto  Resume home torsemide  Continue statin     Recent covid  Completed paxlovid as OP  Currently on RA  No need for steroids/remdesivir        Hypothyroidism  Continue Synthroid           Medical Decision Making:   I personally reviewed labs:BMP: low potassium- will replete potassium, elevated blood sugar- insulin adjusted.  I personally reviewed imaging: I personally reviewed EKG:  Toxic drug monitoring:   Discussed case with: patient, patient's , RN        Code Status: full  DVT Prophylaxis:lovenox   GI Prophylaxis:    Subjective:     Chief Complaint / Reason for Physician Visit  Patient denies new complaints.  Frank removed this morning.  Discussed with patient that plan is to discharge her tomorrow once blood sugar is better controlled.       Objective:     VITALS:   Last 24hrs VS reviewed since prior progress note. Most recent are:  Patient Vitals for the past 24 hrs:   BP Temp Temp src Pulse Resp SpO2   24 1130 120/77 97.8 °F (36.6 °C) Oral 83 12 99 
  Physician Progress Note      PATIENT:               PATRICIA MURRIETA  [facility] #:                  [id number]  :                       1980  ADMIT DATE:       2024 6:36 AM  DISCH DATE:  RESPONDING  PROVIDER #:        Fiordaliza Ding MD          QUERY TEXT:    Pt admitted with DKA. Pt noted to have temp 96.6 (35.9), RR 38, . If   possible, please document in the progress notes and discharge summary if you   are evaluating and /or treating any of the following:      The medical record reflects the following:  Risk Factors: Acute UTI  Clinical Indicators: temp 96.6 (35.9), RR 38, , H&P note noted as Acute   UTI, Acute metabolic encephalopathy:Likely multifactorial DKA, MAME,    24 05:46 WBC: 14.3 (H)      Treatment: Continue ceftriaxone, IV bolus, urine culture and blood culture,  Options provided:  -- Sepsis, present on admission  -- Acute UTI without Sepsis  -- Other - I will add my own diagnosis  -- Disagree - Not applicable / Not valid  -- Disagree - Clinically unable to determine / Unknown  -- Refer to Clinical Documentation Reviewer    PROVIDER RESPONSE TEXT:    Provider is clinically unable to determine a response to this query.    Query created by: Rilee, Cheryle on 2024 11:35 AM      Electronically signed by:  Fiordaliza Ding MD 2024 11:52 AM          
  Physician Progress Note      PATIENT:               PATRICIA MURRIETA  [facility] #:                  [id number]  :                       1980  ADMIT DATE:       2024 6:36 AM  DISCH DATE:        2024 2:29 PM  RESPONDING  PROVIDER #:        Fiordaliza Ding MD          QUERY TEXT:    Pt admitted with DKA. Pt noted to have temp 96.6 (35.9), RR 38, . If   possible, please document in the progress notes and discharge summary if you   are evaluating and /or treating any of the following:    The medical record reflects the following:  Risk Factors: Acute UTI  Clinical Indicators: temp 96.6 (35.9), RR 38, , H&P note noted as Acute   UTI, Acute metabolic encephalopathy:Likely multifactorial DKA, MAME,    24 05:46 WBC: 14.3 (H)    Treatment: Continue ceftriaxone, IV bolus, urine culture and blood culture,  Options provided:  -- Sepsis, present on admission  -- Sepsis was ruled out  -- Other - I will add my own diagnosis  -- Disagree - Not applicable / Not valid  -- Disagree - Clinically unable to determine / Unknown  -- Refer to Clinical Documentation Reviewer    PROVIDER RESPONSE TEXT:    After further study, sepsis was ruled out for this patient.    Query created by: Rilee, Cheryle on 2024 9:15 AM      Electronically signed by:  Fiordaliza Ding MD 2024 12:33 PM          
.Bedside, Verbal, Recorded, and Written shift change report given to Annette rn (oncoming nurse) by Heather rn (offgoing nurse). Report included the following information Nurse Handoff Report, ED SBAR, Adult Overview, Intake/Output, MAR, Recent Results, Cardiac Rhythm sinus rhythum , sinus tachy, and Neuro Assessment.    
Attempted to schedule PCP hospital follow up appointment. Sent a message to PCP office to find patient an appointment within the required timeframe. PCP RN will call the patient to schedule. Joann Evans Care Management Assistant  
Bedside and Verbal shift change report given to CRISTAL aGllegos (oncoming nurse) by CRISTAL Connors (offgoing nurse). Report included the following information Nurse Handoff Report, Intake/Output, MAR, Recent Results, and Cardiac Rhythm SR . Insulin gtt ongoing, verified.    8:51AM - Lantus 20 units subcutaneously administered as ordered, tolerated well.    10:45AM- Worked with PT and OT, tolerated well. Patient up in chair.    11:06AM- Insulin gtt discontinued as ordered.    End of Shift Note    Bedside shift change report given to CRISTAL Connors (oncoming nurse) by Rosy Adrian RN (offgoing nurse).  Report included the following information SBAR, Intake/Output, MAR, Recent Results, and Cardiac Rhythm SR    Shift worked:  7A-7P     Shift summary and any significant changes:    Events as above.  Patient more steady with ambulation using a walker.     Concerns for physician to address:       Zone phone for oncoming shift:          Activity:     Number times ambulated in hallways past shift: 0  Number of times OOB to chair past shift: 1    Cardiac:   Cardiac Monitoring: Yes           Access:  Current line(s): PIV     Genitourinary:   Urinary status: conklin    Respiratory:      Chronic home O2 use?: NO  Incentive spirometer at bedside: NO       GI:     Current diet:  ADULT DIET; Regular; 3 carb choices (45 gm/meal)  Passing flatus: YES  Tolerating current diet: YES       Pain Management:   Patient states pain is manageable on current regimen: YES    Skin:     Interventions: increase time out of bed, limit briefs, and internal/external urinary devices    Patient Safety:  Fall Score:    Interventions: bed/chair alarm, assistive device (walker, cane. etc), gripper socks, pt to call before getting OOB, and stay with me (per policy)       Length of Stay:  Expected LOS: 5  Actual LOS: 1      Rosy Adrian, RN                            
Dr. Paula notified via perfect serve of patent change, patient noted to be alert and oriented x3 in the ED. Patient now lethargic. Opens eyes to voice but non verbal and no command following. VSS. Blood sugars improving in the 400's.     Per Dr. Paula, patient with metabolic encephalopathy and will take some time and ABG's will be ordered.      
End of Shift Note    Bedside shift change report given to  (oncoming nurse) by Virgie Moreira, CRISTAL (offgoing nurse).  Report included the following information SBAR    Shift worked:    7p-7a   Shift summary and any significant changes:     Pt  tearful at the lact of control she has over the diabetes. She is concerned at how she has gotten \"here\" ans requested to have diabetes management speak with her to go over management techniques and set up a plan going forward. ( Please note this teaching would be best given to patient alone first)   Voiding trial started at 0500 when conklin was removed.     Concerns for physician to address:       Zone phone for oncoming shift:            Virgie Moreira, RN                            
End of Shift Note    Bedside shift change report given to Dorothy (oncoming nurse) by Waylon Aden RN (offgoing nurse).  Report included the following information Cardiac Rhythm NSR    Shift worked:  1900-0730     Shift summary and any significant changes:     Patient has improved.She is alert and orientated x 4 .Oral refreshment give  as tolerated.  Analgesia given for body ache.  The patient has been assisted to the bedside commode.where she has had bowel movement overnight. She remains with conklin in place . Care given as per protocol.   Concerns for physician to address:  Long term blood glucose management     Zone phone for oncoming shift:   [phone]       Activity:     Number times ambulated in hallways past shift: 3  Number of times OOB to chair past shift: 3    Cardiac:   Cardiac Monitoring: Yes           Access:  Current line(s): PIV     Genitourinary:   Urinary status: voiding    Respiratory:      Chronic home O2 use?: NO  Incentive spirometer at bedside: NO       GI:     Current diet:  ADULT DIET; Regular; 3 carb choices (45 gm/meal)  Passing flatus: YES  Tolerating current diet: YES       Pain Management:   Patient states pain is manageable on current regimen: YES    Skin:     Interventions: specialty bed, float heels, and increase time out of bed    Patient Safety:  Fall Score:    Interventions: gripper socks       Length of Stay:  Expected LOS: 5  Actual LOS: 2      Waylon Aden RN                           
End of Shift Note    Bedside shift change report given to Rosy (oncoming nurse) by Waylon Aden RN (offgoing nurse).  Report included the following information Cardiac Rhythm nsr with dfib     Shift worked:  1900-0730     Shift summary and any significant changes:     The patient remains altered. She did not follow any commands. She has tried to climb from the bed. She remains on an insulin drip and is being monitored as per protocol. The patient remained anuric. Bladder scan and in and intermittent catheter was placed. Patient passed 1400 mls of clear yellow urine. She has been incontinent of stool  X2.  The patient has not spoken since the start of the shift. Her vitals have remained stable. No new issues raised.     Concerns for physician to address:  AMS,anuria,     Zone phone for oncoming shift:   [phone]       Activity:     Number times ambulated in hallways past shift: 0  Number of times OOB to chair past shift: 0    Cardiac:   Cardiac Monitoring: Yes           Access:  Current line(s): PIV     Genitourinary:   Urinary status: anuric    Respiratory:      Chronic home O2 use?: NO  Incentive spirometer at bedside: NO       GI:     Current diet:  Diet NPO Exceptions are: Sips of Water with Meds, Sips of Clear Liquids, Ice Chips  Passing flatus: YES  Tolerating current diet: YES       Pain Management:   Patient states pain is manageable on current regimen: N/A    Skin:     Interventions: specialty bed    Patient Safety:  Fall Score:    Interventions:        Length of Stay:  Expected LOS: 5  Actual LOS: 1      Waylon Aden RN        bed/chair alarm                    
Nursing notified patient c/o sore throat and asking for chloraseptic spray. No other concerns reported. Upon further questioning nursing reported patient is has no pus in the throat, no PND or runny nose, states pt reported throat feels raw, and top of the tongue near her throat is white like thrush. VSS. Ordered nystatin swish and swallow for thrush and chloraseptic spray prn. Patient denies any further complaints or concerns. No acute distress reported. Nursing to notify Hospitalist for further/continued concerns. Will remain available overnight for further concerns if nursing/patient needs. Will defer further evaluation/management to the day shift team.    Non-billable note.    
Orders received, chart reviewed and patient evaluated by physical therapy. Pending progression with skilled acute physical therapy, recommend:  Therapy 2 days/week in the home with support from .     Recommend with nursing OOB to chair 3x/day and walking daily with x 1 stand by assist and  gait belt . Thank you for completing as able in order to maintain patient strength, endurance and independence.     Full evaluation to follow.     Anna Harrison, PT, DPT     
Patient discharged , patient did not wait for discharged documents . Patient said that she will be find from her patient portal . She left unit with her  without notified to RN .   
Received call from Tele , pt HR is increased 140 to 200 for second , vitals check -102/61 , pulse 75 , SpO2 -100% , temp - 97.5 .   Pt denied for any chest pain , breathing difficulty , no symptoms noted .    Pt is lying on her bed and watching television .spouse at bedside .   Notified to attending provider Fiordaliza Ding MD    
The patient has had 3 episode of loose stool. She has started speaking and has reported that she is in pain. Her anion Gap has closed. She awaits review by the provider.   
The patient has not passed urine since the shift began and prior to that, as reported by previous RN. A repeat bladder scan showed 70 mls. The patient has some abdominal distention. I placed an intermittent catheter  and 1100 mls of urine was removed from the bladder.The provider was notified.  
  03/16/23 127.9 kg (282 lb)            Nutrition Related Findings:    Meds: lantus, humalog, protonix, Kphos, NS@100mL/h.    BM: 1/27   Wound Type: None       Current Nutrition Intake & Therapies:    Average Meal Intake: Refusing to eat  Average Supplements Intake: None Ordered  ADULT DIET; Regular; 3 carb choices (45 gm/meal)    Anthropometric Measures:  Height: 172.7 cm (5' 8\")  Ideal Body Weight (IBW): 140 lbs (64 kg)       Current Body Weight: 109.3 kg (240 lb 15.4 oz), 172.1 % IBW. Weight Source: Stated  Current BMI (kg/m2): 36.6  Usual Body Weight: 127 kg (280 lb) (November 2023)  % Weight Change (Calculated): -13.9                    BMI Categories: Obese Class 2 (BMI 35.0 -39.9)    Estimated Daily Nutrient Needs:  Energy Requirements Based On: Formula  Weight Used for Energy Requirements: Current  Energy (kcal/day): MSJ 1825 (1787 x 1.3 -500 for obesity)  Weight Used for Protein Requirements: Current  Protein (g/day): 87g (0.8gPro/kg)  Method Used for Fluid Requirements: 1 ml/kcal  Fluid (ml/day): 1825mL    Nutrition Diagnosis:   Unintended weight loss related to endocrine dysfuntion as evidenced by weight loss    Nutrition Interventions:   Food and/or Nutrient Delivery: Continue Current Diet  Nutrition Education/Counseling: Education needed  Coordination of Nutrition Care: Continue to monitor while inpatient       Goals:     Goals: PO intake 75% or greater, PO intake 50% or greater, by next RD assessment (with BG <200mg/dL)       Nutrition Monitoring and Evaluation:   Behavioral-Environmental Outcomes: None Identified  Food/Nutrient Intake Outcomes: Food and Nutrient Intake  Physical Signs/Symptoms Outcomes: Biochemical Data, Nutrition Focused Physical Findings, Skin, Weight    Discharge Planning:    Continue current diet, Recommend pursue outpatient diabetes education     Felecia French RD, [facility]  Contact: ext [phone]    
113/55 97.6 °F (36.4 °C) Oral 90 19 99 % -- --   01/27/24 1530 -- -- -- -- -- -- -- 114.7 kg (252 lb 13.9 oz)   01/27/24 1515 (!) 143/52 98.1 °F (36.7 °C) Oral 95 23 98 % -- --   01/27/24 1453 -- -- -- -- -- -- 1.727 m (5' 8\") --           Intake/Output Summary (Last 24 hours) at 1/28/2024 1126  Last data filed at 1/27/2024 2218  Gross per 24 hour   Intake 706.44 ml   Output 720 ml   Net -13.56 ml          I had a face to face encounter and independently examined this patient on 1/28/2024, as outlined below:  PHYSICAL EXAM:  General: alert cooperative  EENT:  EOMI. Anicteric sclerae.  Resp:  CTA bilaterally, no wheezing or rales.  No accessory muscle use  CV:  Regular  rhythm,  No edema  GI:  Soft, Non distended, Non tender.  +Bowel sounds  Neurologic:  alert and awake  Psych:   Good insight. Not anxious nor agitated  Skin:  No rashes.  No jaundice    Reviewed most current lab test results and cultures  YES  Reviewed most current radiology test results   YES  Review and summation of old records today    NO  Reviewed patient's current orders and MAR    YES  PMH/SH reviewed - no change compared to H&P    Procedures: see electronic medical records for all procedures/Xrays and details which were not copied into this note but were reviewed prior to creation of Plan.      LABS:  I reviewed today's most current labs and imaging studies.  Pertinent labs include:  Recent Labs     01/26/24  0706 01/27/24  0546 01/28/24  0438   WBC 11.8* 14.3* 10.9   HGB 14.7 12.9 10.5*   HCT 43.9 37.9 31.5*    247 212       Recent Labs     01/26/24  0706 01/26/24  1245 01/26/24  1906 01/26/24  2233 01/27/24  0304 01/27/24  0546 01/28/24  0438   *   < > 136 135* 136 137 136   K 5.7*   < > 4.3 4.5 3.8 3.9 3.9   CL 78*   < > 102 104 105 105 104   CO2 8*   < > 20* 23 23 24 21   GLUCOSE 1,124*   < > 442* 291* 191* 128* 370*   BUN 30*   < > 27* 25* 24* 23* 15   CREATININE 2.68*   < > 1.80* 1.66* 1.48* 1.38* 1.02   CALCIUM 13.4*   < >

## 2024-01-31 NOTE — TELEPHONE ENCOUNTER
[facility] is calling to see if  would be able to follow this patient's home health care.    [phone] Ashwini

## 2024-01-31 NOTE — TELEPHONE ENCOUNTER
Telephone call made to Ashwini to let her know that Dr. Grimaldo does not follow home health. Verified understanding.

## 2024-02-01 ENCOUNTER — TELEPHONE (OUTPATIENT)
Age: 44
End: 2024-02-01

## 2024-02-01 LAB
BACTERIA SPEC CULT: NORMAL
BACTERIA SPEC CULT: NORMAL
SERVICE CMNT-IMP: NORMAL
SERVICE CMNT-IMP: NORMAL

## 2024-02-01 NOTE — TELEPHONE ENCOUNTER
----- Message from Britt Acuna sent at 2/1/2024  9:48 AM EST -----  Subject: Message to Provider    QUESTIONS  Information for Provider? Home Health wants to see if Linda Monique   will be the following physician on this patient. FAX [phone]  ---------------------------------------------------------------------------  --------------  CALL BACK INFO  [phone]; Do not leave any message, patient will call back for answer  ---------------------------------------------------------------------------  --------------  SCRIPT ANSWERS  Relationship to Patient? Covered Entity  Covered Entity Type? Home Health Care?  Representative Name? Tonya

## 2024-02-01 NOTE — TELEPHONE ENCOUNTER
Two patient identifiers confirmed:  Informed the patient that we could see her on 2/7/24 at 11:30. Patient acce[jase the date and time.

## 2024-02-02 ENCOUNTER — TELEPHONE (OUTPATIENT)
Age: 44
End: 2024-02-02

## 2024-02-02 NOTE — TELEPHONE ENCOUNTER
Spoke with Desire estrada with Homehealth was calling to see if PCP will follow through with home health orders. Pt's blood sugar readings has been 131 to 242 and and because of high readings she says the pt is concerned, and not sure if PCP would like to increase her insulin. Best call back number [phone]

## 2024-02-07 ENCOUNTER — OFFICE VISIT (OUTPATIENT)
Age: 44
End: 2024-02-07
Payer: MEDICARE

## 2024-02-07 VITALS
BODY MASS INDEX: 38.98 KG/M2 | OXYGEN SATURATION: 99 % | RESPIRATION RATE: 16 BRPM | SYSTOLIC BLOOD PRESSURE: 103 MMHG | TEMPERATURE: 99.1 F | DIASTOLIC BLOOD PRESSURE: 76 MMHG | WEIGHT: 257.2 LBS | HEART RATE: 94 BPM | HEIGHT: 68 IN

## 2024-02-07 DIAGNOSIS — Z09 HOSPITAL DISCHARGE FOLLOW-UP: ICD-10-CM

## 2024-02-07 DIAGNOSIS — N18.31 CHRONIC KIDNEY DISEASE, STAGE 3A (HCC): ICD-10-CM

## 2024-02-07 DIAGNOSIS — E11.65 UNCONTROLLED TYPE 2 DIABETES MELLITUS WITH HYPERGLYCEMIA (HCC): Primary | ICD-10-CM

## 2024-02-07 DIAGNOSIS — N30.00 ACUTE CYSTITIS WITHOUT HEMATURIA: ICD-10-CM

## 2024-02-07 DIAGNOSIS — E66.01 SEVERE OBESITY (BMI 35.0-39.9) WITH COMORBIDITY (HCC): ICD-10-CM

## 2024-02-07 DIAGNOSIS — I50.9 CONGESTIVE HEART FAILURE, UNSPECIFIED HF CHRONICITY, UNSPECIFIED HEART FAILURE TYPE (HCC): ICD-10-CM

## 2024-02-07 LAB
ALBUMIN SERPL-MCNC: 3.6 G/DL (ref 3.5–5)
ALBUMIN/GLOB SERPL: 1 (ref 1.1–2.2)
ALP SERPL-CCNC: 85 U/L (ref 45–117)
ALT SERPL-CCNC: 54 U/L (ref 12–78)
ANION GAP SERPL CALC-SCNC: 5 MMOL/L (ref 5–15)
AST SERPL-CCNC: 43 U/L (ref 15–37)
BASOPHILS # BLD: 0.1 K/UL (ref 0–0.1)
BASOPHILS NFR BLD: 1 % (ref 0–1)
BILIRUB SERPL-MCNC: 0.6 MG/DL (ref 0.2–1)
BUN SERPL-MCNC: 6 MG/DL (ref 6–20)
BUN/CREAT SERPL: 7 (ref 12–20)
CALCIUM SERPL-MCNC: 9.2 MG/DL (ref 8.5–10.1)
CHLORIDE SERPL-SCNC: 103 MMOL/L (ref 97–108)
CHOLEST SERPL-MCNC: 186 MG/DL
CO2 SERPL-SCNC: 31 MMOL/L (ref 21–32)
CREAT SERPL-MCNC: 0.83 MG/DL (ref 0.55–1.02)
DIFFERENTIAL METHOD BLD: ABNORMAL
EOSINOPHIL # BLD: 0 K/UL (ref 0–0.4)
EOSINOPHIL NFR BLD: 0 % (ref 0–7)
ERYTHROCYTE [DISTWIDTH] IN BLOOD BY AUTOMATED COUNT: 17.2 % (ref 11.5–14.5)
GLOBULIN SER CALC-MCNC: 3.7 G/DL (ref 2–4)
GLUCOSE SERPL-MCNC: 130 MG/DL (ref 65–100)
HCT VFR BLD AUTO: 35.9 % (ref 35–47)
HDLC SERPL-MCNC: 46 MG/DL
HDLC SERPL: 4 (ref 0–5)
HGB BLD-MCNC: 11.9 G/DL (ref 11.5–16)
IMM GRANULOCYTES # BLD AUTO: 0 K/UL (ref 0–0.04)
IMM GRANULOCYTES NFR BLD AUTO: 0 % (ref 0–0.5)
LDLC SERPL CALC-MCNC: 109.6 MG/DL (ref 0–100)
LYMPHOCYTES # BLD: 4.2 K/UL (ref 0.8–3.5)
LYMPHOCYTES NFR BLD: 41 % (ref 12–49)
MCH RBC QN AUTO: 30.1 PG (ref 26–34)
MCHC RBC AUTO-ENTMCNC: 33.1 G/DL (ref 30–36.5)
MCV RBC AUTO: 90.7 FL (ref 80–99)
MONOCYTES # BLD: 0.6 K/UL (ref 0–1)
MONOCYTES NFR BLD: 6 % (ref 5–13)
NEUTS SEG # BLD: 5.3 K/UL (ref 1.8–8)
NEUTS SEG NFR BLD: 52 % (ref 32–75)
NRBC # BLD: 0 K/UL (ref 0–0.01)
NRBC BLD-RTO: 0 PER 100 WBC
PLATELET # BLD AUTO: 486 K/UL (ref 150–400)
PMV BLD AUTO: 9.4 FL (ref 8.9–12.9)
POTASSIUM SERPL-SCNC: 3.4 MMOL/L (ref 3.5–5.1)
PROT SERPL-MCNC: 7.3 G/DL (ref 6.4–8.2)
RBC # BLD AUTO: 3.96 M/UL (ref 3.8–5.2)
SODIUM SERPL-SCNC: 139 MMOL/L (ref 136–145)
TRIGL SERPL-MCNC: 152 MG/DL
VLDLC SERPL CALC-MCNC: 30.4 MG/DL
WBC # BLD AUTO: 10.2 K/UL (ref 3.6–11)

## 2024-02-07 PROCEDURE — 99496 TRANSJ CARE MGMT HIGH F2F 7D: CPT | Performed by: NURSE PRACTITIONER

## 2024-02-07 RX ORDER — INSULIN DEGLUDEC 200 U/ML
56 INJECTION, SOLUTION SUBCUTANEOUS DAILY
Qty: 3 ADJUSTABLE DOSE PRE-FILLED PEN SYRINGE | Refills: 2 | Status: SHIPPED | OUTPATIENT
Start: 2024-02-07

## 2024-02-07 RX ORDER — FLUCONAZOLE 150 MG/1
150 TABLET ORAL ONCE
Qty: 2 TABLET | Refills: 0 | Status: SHIPPED | OUTPATIENT
Start: 2024-02-07 | End: 2024-02-07

## 2024-02-07 NOTE — PROGRESS NOTES
Assessment/Plan:     1. Uncontrolled type 2 diabetes mellitus with hyperglycemia (HCC)  -     [facility] - Haleigh Sims MD, EndocrinologyGeoffrey ([address])  -     Comprehensive Metabolic Panel; Future  -     Lipid Panel; Future  -     CBC with Auto Differential; Future  -     Microalbumin / Creatinine Urine Ratio; Future  -     Insulin Degludec (TRESIBA FLEXTOUCH) 200 UNIT/ML SOPN; Inject 56 Units into the skin daily, Disp-3 Adjustable Dose Pre-filled Pen Syringe, R-2Normal  Doreen placed in office today.  Sugars show excellent control at this time.  Refer to endocrinology.  May require a ramp down in the coming weeks.  Start Tresiba as directed.  2. Severe obesity (BMI 35.0-39.9) with comorbidity ([facility])  3. Congestive heart failure, unspecified HF chronicity, unspecified heart failure type ([facility])  Assessment & Plan:   Monitored by specialist- no acute findings meriting change in the plan  4. Chronic kidney disease, stage 3a ([facility])  Assessment & Plan:   Monitored by specialist- no acute findings meriting change in the plan  5. Acute cystitis without hematuria  -     Culture, Urine; Future  6. Hospital discharge follow-up  -     CA DISCHARGE MEDS RECONCILED W/ CURRENT OUTPATIENT MED LIST       Return in about 4 weeks (around 3/6/2024) for Follow Up.     Discussed expected course/resolution/complications of diagnosis in detail with patient.    Medication risks/benefits/costs/interactions/alternatives discussed with patient.    Pt was given after visit summary which includes diagnoses, current medications & vitals.   Pt expressed understanding with the diagnosis and plan          Subjective:      Alissa Juarez is a 43 y.o. female who presents for had concerns including Follow-Up from Hospital.     She is accompanied by her  today.    Hospital Follow Up  Alissa Juarez is seen for follow up from recent admission to [facility] on 1/26/2024.  We reviewed the lab results, imaging, the notes, the

## 2024-02-07 NOTE — PROGRESS NOTES
Chief Complaint   Patient presents with    Follow-Up from Hospital     \"Have you been to the ER, urgent care clinic since your last visit?  Hospitalized since your last visit?\"    YES - When: approximately 2  weeks ago.  Where and Why: [facility] ED.    “Have you seen or consulted any other health care providers outside of [facility] since your last visit?”    NO

## 2024-02-08 LAB
BACTERIA SPEC CULT: NORMAL
CREAT UR-MCNC: 20.6 MG/DL
MICROALBUMIN UR-MCNC: 1.36 MG/DL
MICROALBUMIN/CREAT UR-RTO: 66 MG/G (ref 0–30)
SERVICE CMNT-IMP: NORMAL

## 2024-02-09 ENCOUNTER — TELEPHONE (OUTPATIENT)
Age: 44
End: 2024-02-09

## 2024-02-09 NOTE — TELEPHONE ENCOUNTER
Dontrell Grimaldo MD  You7 minutes ago (2:28 PM)       Her BP tends to run low. If she's feeling ok. No changes. thx   TC to [place] with . Advised as above per Dr. Grimaldo. States she will be seeing her again in a few days and will let us know how she is doing. No further questions.

## 2024-02-09 NOTE — TELEPHONE ENCOUNTER
TC harjeet Russ with HH, pt ID verified. States pt's weight has increased by 5 pounds since Monday (254# to 259#) and her BP today was 89/76 . BP was checked a few times. Pt is feeling well, no swelling, no shortness of breath, no dizziness; pt is aware if she has any of these symptoms to be seen in the ER.Pt's blood pressure on Monday was in the 90's as well. Reviewed meds and pt taking as prescribed. Will forward to MD for recommendations.

## 2024-02-09 NOTE — TELEPHONE ENCOUNTER
Petrona the RN with [facility], called to report pt's weight and BP. She stated that she had just left pt's home and pt's BP was 89/76 heart rate 100. Pt's  weight was 259 lbs. Pt's weight was 254 lbs on Monday 02/05/2024.    CRISTAL Russ cell ph # [phone]

## 2024-02-13 NOTE — TELEPHONE ENCOUNTER
Two patient identifiers confirmed:  Informed the patient that she could get another Doreen sensor from her pharmacy or call the hot line per Eneida.

## 2024-02-19 ENCOUNTER — OFFICE VISIT (OUTPATIENT)
Age: 44
End: 2024-02-19
Payer: MEDICARE

## 2024-02-19 VITALS
WEIGHT: 259 LBS | DIASTOLIC BLOOD PRESSURE: 68 MMHG | HEIGHT: 68 IN | HEART RATE: 87 BPM | SYSTOLIC BLOOD PRESSURE: 101 MMHG | BODY MASS INDEX: 39.25 KG/M2

## 2024-02-19 DIAGNOSIS — E10.65 TYPE 1 DIABETES MELLITUS WITH HYPERGLYCEMIA (HCC): Primary | ICD-10-CM

## 2024-02-19 DIAGNOSIS — E10.65 TYPE 1 DIABETES MELLITUS WITH HYPERGLYCEMIA (HCC): ICD-10-CM

## 2024-02-19 PROCEDURE — 99204 OFFICE O/P NEW MOD 45 MIN: CPT | Performed by: INTERNAL MEDICINE

## 2024-02-19 PROCEDURE — G8484 FLU IMMUNIZE NO ADMIN: HCPCS | Performed by: INTERNAL MEDICINE

## 2024-02-19 PROCEDURE — G8428 CUR MEDS NOT DOCUMENT: HCPCS | Performed by: INTERNAL MEDICINE

## 2024-02-19 PROCEDURE — 2022F DILAT RTA XM EVC RTNOPTHY: CPT | Performed by: INTERNAL MEDICINE

## 2024-02-19 PROCEDURE — G8417 CALC BMI ABV UP PARAM F/U: HCPCS | Performed by: INTERNAL MEDICINE

## 2024-02-19 PROCEDURE — 4004F PT TOBACCO SCREEN RCVD TLK: CPT | Performed by: INTERNAL MEDICINE

## 2024-02-19 PROCEDURE — 1111F DSCHRG MED/CURRENT MED MERGE: CPT | Performed by: INTERNAL MEDICINE

## 2024-02-19 PROCEDURE — 3046F HEMOGLOBIN A1C LEVEL >9.0%: CPT | Performed by: INTERNAL MEDICINE

## 2024-02-19 RX ORDER — ACYCLOVIR 400 MG/1
1 TABLET ORAL
Qty: 9 EACH | Refills: 4 | Status: SHIPPED | OUTPATIENT
Start: 2024-02-19

## 2024-02-19 NOTE — PROGRESS NOTES
toast.  Lunch can be a sandwich with or without chips.  She drinks sparkling water during the day.  Dinner is usually a meat and a vegetable usually nonstarchy.  Her  is the cook. They occasionally have potatoes or rice.  Last night they had pizza and she woke up this morning with a blood sugar of 91.  She occasionally snacks on fruit or cucumbers.    ROS  Review of Systems - General ROS: positive for  - fatigue  Psychological ROS: negative  Ophthalmic ROS: positive for - blurry vision  ENT ROS: negative  Respiratory ROS: no cough, shortness of breath, or wheezing  Cardiovascular ROS: no chest pain or dyspnea on exertion  Gastrointestinal ROS: no abdominal pain, change in bowel habits, or black or bloody stools  Genito-Urinary ROS: no dysuria, trouble voiding, or hematuria  Musculoskeletal ROS: negative  Neurological ROS: no TIA or stroke symptoms  Dermatological ROS: negative   Family History   Problem Relation Age of Onset    Asthma Brother     No Known Problems Daughter     No Known Problems Son     Diabetes Maternal Grandfather     Asthma Sister     Hypertension Sister     Hypertension Paternal Grandmother     Breast Cancer Maternal Grandmother 65    Heart Failure Father     Substance Abuse Father     No Known Problems Mother         Social History     Socioeconomic History    Marital status:      Spouse name: None    Number of children: None    Years of education: None    Highest education level: None   Tobacco Use    Smoking status: Every Day     Current packs/day: 0.25     Average packs/day: 0.3 packs/day for 20.0 years (5.0 ttl pk-yrs)     Types: Cigarettes    Smokeless tobacco: Never   Substance and Sexual Activity    Alcohol use: Yes     Comment: Pt states maybe once a year    Drug use: No     Social Determinants of Health     Financial Resource Strain: Patient Declined (8/2/2023)    Overall Financial Resource Strain (CARDIA)     Difficulty of Paying Living Expenses: Patient declined   Food

## 2024-02-20 ENCOUNTER — PATIENT MESSAGE (OUTPATIENT)
Age: 44
End: 2024-02-20

## 2024-02-20 DIAGNOSIS — E10.65 TYPE 1 DIABETES MELLITUS WITH HYPERGLYCEMIA (HCC): Primary | ICD-10-CM

## 2024-02-21 LAB — GAD65 AB SER-ACNC: <5 U/ML (ref 0–5)

## 2024-02-22 ENCOUNTER — TELEPHONE (OUTPATIENT)
Age: 44
End: 2024-02-22

## 2024-02-22 NOTE — TELEPHONE ENCOUNTER
Pharmacy Progress Note     PCP requested this writer reach out to pt concerning CGM concerns.    This writer contacted pt to inquire about concerns.  Pt states Endo sent in new CGM - Dexcom - which is working great.  No concerns.    There are no discontinued medications.  No orders of the defined types were placed in this encounter.        Deepali Wilson, PharmD, [facility]  Clinical Pharmacist Specialist      For Pharmacy Admin Tracking Only    Program: Medical Group  CPA in place:  Yes  Recommendation Provided To: Patient/Caregiver: 0 via Telephone  Intervention Accepted By: Patient/Caregiver: 0  Time Spent (min): 5

## 2024-02-26 RX ORDER — PEN NEEDLE, DIABETIC 32GX 5/32"
1 NEEDLE, DISPOSABLE MISCELLANEOUS 4 TIMES DAILY
Qty: 400 EACH | Refills: 3 | Status: SHIPPED | OUTPATIENT
Start: 2024-02-26

## 2024-02-29 DIAGNOSIS — E78.2 MIXED HYPERLIPIDEMIA: ICD-10-CM

## 2024-02-29 DIAGNOSIS — I50.9 HEART FAILURE, UNSPECIFIED (HCC): ICD-10-CM

## 2024-02-29 RX ORDER — ATORVASTATIN CALCIUM 20 MG/1
TABLET, FILM COATED ORAL
Qty: 90 TABLET | Refills: 1 | Status: SHIPPED | OUTPATIENT
Start: 2024-02-29

## 2024-02-29 RX ORDER — PANTOPRAZOLE SODIUM 20 MG/1
TABLET, DELAYED RELEASE ORAL
Qty: 90 TABLET | Refills: 1 | Status: SHIPPED | OUTPATIENT
Start: 2024-02-29

## 2024-02-29 NOTE — TELEPHONE ENCOUNTER
PCP: Linda Monique APRN - NP    Last appt: 2/7/2024   Future Appointments   Date Time Provider Department Center   3/7/2024 10:45 AM Linda Monique APRN - NP PAFP BS AMB   4/18/2024 10:20 AM Dontrell Grimaldo MD [facility] BS AMB   6/28/2024  3:30 PM Unruly Duque MD RDE  BS AMB   11/26/2024  3:40 PM DAVINA MILIAN BS AMB   11/26/2024  4:00 PM Mikel Peres MD [facility] BS AMB       Requested Prescriptions     Pending Prescriptions Disp Refills    atorvastatin (LIPITOR) 20 MG tablet [Pharmacy Med Name: ATORVASTATIN 20 MG TABLET] 90 tablet 1     Sig: TAKE 1 TABLET BY MOUTH EVERY DAY    pantoprazole (PROTONIX) 20 MG tablet [Pharmacy Med Name: PANTOPRAZOLE SOD DR 20 MG TAB] 90 tablet 1     Sig: TAKE 1 TABLET BY MOUTH EVERY DAY         Prior labs and Blood pressures:  BP Readings from Last 3 Encounters:   02/19/24 101/68   02/07/24 103/76   01/30/24 119/79     Lab Results   Component Value Date/Time     02/07/2024 12:30 PM    K 3.4 02/07/2024 12:30 PM     02/07/2024 12:30 PM    CO2 31 02/07/2024 12:30 PM    BUN 6 02/07/2024 12:30 PM    GFRAA >60 03/09/2022 10:45 AM     Lab Results   Component Value Date/Time    ZUT7MSPB 6.4 10/06/2022 02:00 PM     Lab Results   Component Value Date/Time    CHOL 186 02/07/2024 12:30 PM    HDL 46 02/07/2024 12:30 PM    HDLPOC 23 09/27/2019 10:31 AM     No results found for: \"VITD3\", \"VD3RIA\"    Lab Results   Component Value Date/Time    TSH 0.506 01/26/2024 03:11 PM

## 2024-04-18 ENCOUNTER — OFFICE VISIT (OUTPATIENT)
Age: 44
End: 2024-04-18
Payer: MEDICARE

## 2024-04-18 VITALS
DIASTOLIC BLOOD PRESSURE: 72 MMHG | RESPIRATION RATE: 16 BRPM | WEIGHT: 253 LBS | HEART RATE: 82 BPM | BODY MASS INDEX: 38.34 KG/M2 | HEIGHT: 68 IN | SYSTOLIC BLOOD PRESSURE: 130 MMHG | OXYGEN SATURATION: 99 %

## 2024-04-18 DIAGNOSIS — Z98.890 H/O MITRAL VALVE REPAIR: ICD-10-CM

## 2024-04-18 DIAGNOSIS — Z87.891 HISTORY OF TOBACCO USE: ICD-10-CM

## 2024-04-18 DIAGNOSIS — Z95.810 CARDIAC DEFIBRILLATOR IN SITU: ICD-10-CM

## 2024-04-18 DIAGNOSIS — I10 BENIGN ESSENTIAL HTN: ICD-10-CM

## 2024-04-18 DIAGNOSIS — I42.8 NON-ISCHEMIC CARDIOMYOPATHY (HCC): Primary | ICD-10-CM

## 2024-04-18 PROCEDURE — G8417 CALC BMI ABV UP PARAM F/U: HCPCS | Performed by: INTERNAL MEDICINE

## 2024-04-18 PROCEDURE — 4004F PT TOBACCO SCREEN RCVD TLK: CPT | Performed by: INTERNAL MEDICINE

## 2024-04-18 PROCEDURE — 99214 OFFICE O/P EST MOD 30 MIN: CPT | Performed by: INTERNAL MEDICINE

## 2024-04-18 PROCEDURE — 3078F DIAST BP <80 MM HG: CPT | Performed by: INTERNAL MEDICINE

## 2024-04-18 PROCEDURE — 3075F SYST BP GE 130 - 139MM HG: CPT | Performed by: INTERNAL MEDICINE

## 2024-04-18 PROCEDURE — G8427 DOCREV CUR MEDS BY ELIG CLIN: HCPCS | Performed by: INTERNAL MEDICINE

## 2024-04-18 ASSESSMENT — PATIENT HEALTH QUESTIONNAIRE - PHQ9
SUM OF ALL RESPONSES TO PHQ QUESTIONS 1-9: 4
7. TROUBLE CONCENTRATING ON THINGS, SUCH AS READING THE NEWSPAPER OR WATCHING TELEVISION: NOT AT ALL
3. TROUBLE FALLING OR STAYING ASLEEP: NOT AT ALL
2. FEELING DOWN, DEPRESSED OR HOPELESS: MORE THAN HALF THE DAYS
5. POOR APPETITE OR OVEREATING: NOT AT ALL
4. FEELING TIRED OR HAVING LITTLE ENERGY: NOT AT ALL
9. THOUGHTS THAT YOU WOULD BE BETTER OFF DEAD, OR OF HURTING YOURSELF: NOT AT ALL
SUM OF ALL RESPONSES TO PHQ QUESTIONS 1-9: 4
1. LITTLE INTEREST OR PLEASURE IN DOING THINGS: MORE THAN HALF THE DAYS
SUM OF ALL RESPONSES TO PHQ QUESTIONS 1-9: 4
SUM OF ALL RESPONSES TO PHQ QUESTIONS 1-9: 4
8. MOVING OR SPEAKING SO SLOWLY THAT OTHER PEOPLE COULD HAVE NOTICED. OR THE OPPOSITE, BEING SO FIGETY OR RESTLESS THAT YOU HAVE BEEN MOVING AROUND A LOT MORE THAN USUAL: NOT AT ALL
SUM OF ALL RESPONSES TO PHQ9 QUESTIONS 1 & 2: 4
10. IF YOU CHECKED OFF ANY PROBLEMS, HOW DIFFICULT HAVE THESE PROBLEMS MADE IT FOR YOU TO DO YOUR WORK, TAKE CARE OF THINGS AT HOME, OR GET ALONG WITH OTHER PEOPLE: NOT DIFFICULT AT ALL
6. FEELING BAD ABOUT YOURSELF - OR THAT YOU ARE A FAILURE OR HAVE LET YOURSELF OR YOUR FAMILY DOWN: NOT AT ALL

## 2024-04-18 NOTE — PROGRESS NOTES
[facility] [place] [facility]:   [phone]    History of Present Illness:  Ms. Juarez is a 42 yo F with h/o non ischemic cardiomyopathy EF 55% (as low as 13% in the past), cath 2018 and 2019 with no significant CAD; mild LAD/RCA plaquing, severe MR s/p MV repair 2018, status post Medtronic ICD in 01/2021, HTN, mixed hyperlipidemia, h/o hyperthyroid, Grave's disease (treated with radiation isotope therapy in 2019). Cardiac MRI in 01/2018 with ejection fraction of 40% and findings of dilated nonischemic cardiomyopathy. Right heart catheterization in 2018, 2019 and 2020.     Over the last few months at one point she had lost 40 pounds unintentionally and was found to be with uncontrolled diabetes/DKA with A1c greater than 14.  She saw endocrinology and this has been better now.  Cardiac-wise she denies any exertional chest pain.  Breathing has been stable.  No significant palpitation.  She also had COVID as well. She is compensated on exam with clear lungs and just trace lower extremity edema, I/VI systolic murmur left sternal border.    Assessment and Plan:  1. Nonischemic cardiomyopathy.  EF of 50-55% most recently (had been as low as 13% in the past).  Will have her follow up with a same-day echo in six months.  2. Severe MRI/status post mitral valve repair in 2018.  3. Medtronic ICD.  Followed by Dr. Peres in device clinic.  4. Essential hypertension.  Blood pressure controlled.  5. Grave's disease, treated with radiation isotope therapy in 2019.  6. Tobacco use. Encouraged cessation.      She  has a past medical history of Congestive heart disease (HCC), Diabetes (HCC), Endocrine disease, Other ill-defined conditions(799.89), Other ill-defined conditions(799.89), and Thyroid disease.    All other systems negative except as above.     PE  Vitals:    04/18/24 1008   BP: 130/72   Site: Left Upper Arm   Position: Sitting   Cuff Size: Large Adult   Pulse: 82   Resp: 16   SpO2: 99%   Weight: 114.8 kg (253 lb)   Height:

## 2024-05-07 DIAGNOSIS — E11.65 UNCONTROLLED TYPE 2 DIABETES MELLITUS WITH HYPERGLYCEMIA (HCC): ICD-10-CM

## 2024-05-10 RX ORDER — INSULIN DEGLUDEC 200 U/ML
56 INJECTION, SOLUTION SUBCUTANEOUS DAILY
Qty: 9 ADJUSTABLE DOSE PRE-FILLED PEN SYRINGE | Refills: 3 | Status: SHIPPED | OUTPATIENT
Start: 2024-05-10

## 2024-05-19 NOTE — TELEPHONE ENCOUNTER
PCP: Linda Monique, APRN - NP    Last appt: 2/7/2024   Future Appointments   Date Time Provider Department Center   6/28/2024  3:30 PM Unruly Duque MD RDE [facility] [address] BS AMB   10/23/2024 11:00 AM BSC DAVINA VASCULAR ASHISH BS AMB   10/23/2024 11:40 AM Dontrell Grimaldo MD [facility] BS AMB   11/26/2024  3:40 PM CATHRYN3, DAVINA HINOJOSA BS AMB   11/26/2024  4:00 PM Mikel Peres MD [facility] BS AMB       Requested Prescriptions     Pending Prescriptions Disp Refills    TRESIBA FLEXTOUCH 200 UNIT/ML SOPN [Pharmacy Med Name: TRESIBA FLEXTOUCH 200 UNIT/ML]       Sig: INJECT 56 UNITS INTO THE SKIN DAILY         Prior labs and Blood pressures:  BP Readings from Last 3 Encounters:   04/18/24 130/72   02/19/24 101/68   02/07/24 103/76     Lab Results   Component Value Date/Time     02/07/2024 12:30 PM    K 3.4 02/07/2024 12:30 PM     02/07/2024 12:30 PM    CO2 31 02/07/2024 12:30 PM    BUN 6 02/07/2024 12:30 PM    GFRAA >60 03/09/2022 10:45 AM     Lab Results   Component Value Date/Time    BMA5GLQJ 6.4 10/06/2022 02:00 PM     Lab Results   Component Value Date/Time    CHOL 186 02/07/2024 12:30 PM    HDL 46 02/07/2024 12:30 PM    HDLPOC 23 09/27/2019 10:31 AM    .6 02/07/2024 12:30 PM    VLDL 30.4 02/07/2024 12:30 PM     No results found for: \"VITD3\", \"VD3RIA\"    Lab Results   Component Value Date/Time    TSH 0.506 01/26/2024 03:11 PM      
0 = swallows foods/liquids without difficulty

## 2024-06-09 DIAGNOSIS — E03.9 ACQUIRED HYPOTHYROIDISM: ICD-10-CM

## 2024-06-10 RX ORDER — LEVOTHYROXINE SODIUM 0.1 MG/1
100 TABLET ORAL DAILY
Qty: 90 TABLET | Refills: 1 | Status: SHIPPED | OUTPATIENT
Start: 2024-06-10

## 2024-06-10 NOTE — TELEPHONE ENCOUNTER
PCP: Linda Monique, APRN - NP    Last appt: 2/7/2024   Future Appointments   Date Time Provider Department Center   6/28/2024  3:30 PM Unruly Duque MD RDE [facility] [address] BS AMB   10/23/2024 11:00 AM BSC GHOSH VASCULAR [facility] BS AMB   10/23/2024 11:40 AM Dontrell Grimaldo MD [facility] BS AMB   11/26/2024  3:40 PM DAVINA MILIAN BS AMB   11/26/2024  4:00 PM Mikel Peres MD [facility] BS AMB       Requested Prescriptions     Pending Prescriptions Disp Refills    levothyroxine (SYNTHROID) 100 MCG tablet [Pharmacy Med Name: LEVOTHYROXINE 100 MCG TABLET] 90 tablet 1     Sig: TAKE 1 TABLET BY MOUTH EVERY DAY         Prior labs and Blood pressures:  BP Readings from Last 3 Encounters:   04/18/24 130/72   02/19/24 101/68   02/07/24 103/76     Lab Results   Component Value Date/Time     02/07/2024 12:30 PM    K 3.4 02/07/2024 12:30 PM     02/07/2024 12:30 PM    CO2 31 02/07/2024 12:30 PM    BUN 6 02/07/2024 12:30 PM    GFRAA >60 03/09/2022 10:45 AM     Lab Results   Component Value Date/Time    MFZ7HXVA 6.4 10/06/2022 02:00 PM     Lab Results   Component Value Date/Time    CHOL 186 02/07/2024 12:30 PM    HDL 46 02/07/2024 12:30 PM    HDLPOC 23 09/27/2019 10:31 AM    .6 02/07/2024 12:30 PM    VLDL 30.4 02/07/2024 12:30 PM     No results found for: \"VITD3\"    Lab Results   Component Value Date/Time    TSH 0.506 01/26/2024 03:11 PM

## 2024-06-28 ENCOUNTER — OFFICE VISIT (OUTPATIENT)
Age: 44
End: 2024-06-28
Payer: MEDICARE

## 2024-06-28 VITALS
BODY MASS INDEX: 40.5 KG/M2 | DIASTOLIC BLOOD PRESSURE: 74 MMHG | HEIGHT: 68 IN | OXYGEN SATURATION: 100 % | SYSTOLIC BLOOD PRESSURE: 122 MMHG | HEART RATE: 83 BPM | WEIGHT: 267.2 LBS

## 2024-06-28 DIAGNOSIS — E10.65 TYPE 1 DIABETES MELLITUS WITH HYPERGLYCEMIA (HCC): ICD-10-CM

## 2024-06-28 DIAGNOSIS — E89.0 POSTABLATIVE HYPOTHYROIDISM: ICD-10-CM

## 2024-06-28 DIAGNOSIS — E10.65 TYPE 1 DIABETES MELLITUS WITH HYPERGLYCEMIA (HCC): Primary | ICD-10-CM

## 2024-06-28 LAB — HBA1C MFR BLD: 6.2 %

## 2024-06-28 PROCEDURE — 4004F PT TOBACCO SCREEN RCVD TLK: CPT | Performed by: INTERNAL MEDICINE

## 2024-06-28 PROCEDURE — G8427 DOCREV CUR MEDS BY ELIG CLIN: HCPCS | Performed by: INTERNAL MEDICINE

## 2024-06-28 PROCEDURE — G8417 CALC BMI ABV UP PARAM F/U: HCPCS | Performed by: INTERNAL MEDICINE

## 2024-06-28 PROCEDURE — 83036 HEMOGLOBIN GLYCOSYLATED A1C: CPT | Performed by: INTERNAL MEDICINE

## 2024-06-28 PROCEDURE — 2022F DILAT RTA XM EVC RTNOPTHY: CPT | Performed by: INTERNAL MEDICINE

## 2024-06-28 PROCEDURE — 3046F HEMOGLOBIN A1C LEVEL >9.0%: CPT | Performed by: INTERNAL MEDICINE

## 2024-06-28 PROCEDURE — 99214 OFFICE O/P EST MOD 30 MIN: CPT | Performed by: INTERNAL MEDICINE

## 2024-06-28 NOTE — PROGRESS NOTES
This is a 43-year-old woman with a history of nonischemic cardiomyopathy status post mitral valve repair at [facility] who presented to [facility] in 2022 with a blood sugar of 990.  Patient was treated with IV insulin and was transitioned to insulin therapy subcutaneously.DEANN Ab negative in 2022. She was discharged from [facility] on Lantus and Humalog.  In March 2023, her insulin requirements fell dramatically and her A1c fell to 5.0%.  She was taken off insulin and started on Trulicity which she took for 6 weeks but continued to have episodes of hypoglycemia so this medication was discontinued.  She was then off all antihyperglycemic therapy from March 2023 until January 2024 when she presented to [facility].  Labs at her presentation included an A1c of greater than 14%, glucose 1124, bicarb 8, anion gap 34, creat 2.68, venous pH 7.24, urine ketones 4+.  She was treated with intravenous insulin according to the DKA protocol.  She reported a 40 pound weight loss at the time of the presentation in January 2024.  She was seen by [facility] and then transitioned off the insulin drip onto basal bolus insulin.  She was discharged from [facility] on 56 units of Lantus and 11 units of Humalog.  I saw her in clinic February 2024. I again considered possible Type 1 and repeated her DEANN antibodies which were again negative. She then called me in early May 2024 with episodes of recurrent hypoglycemia almost identical to the situation in 2022 and we again stopped the insulin.  Her A1c today is 6.2%.     Current Diabetes Medication  Tresiba 56 units (stopped May 2024)  Humalog 11 units with meals (Stopped May 2024)  Dexcom G7         Past Medical History  Graves' disease status post radioactive iodine treatment with subsequent hypothyroidism.  Her levothyroxine has gradually been increased from 50 ug and was recently increased to 100 mcg daily.  She has a history of nonischemic cardiomyopathy with a recent EF of 55% (up from 13%),

## 2024-06-28 NOTE — PROGRESS NOTES
Identified pt with two pt identifiers(name and ). Reviewed record in preparation for visit and have obtained necessary documentation. All patient medications has been reviewed.  Chief Complaint   Patient presents with    Follow-up    Diabetes     Wt Readings from Last 3 Encounters:   24 121.2 kg (267 lb 3.2 oz)   24 114.8 kg (253 lb)   24 117.5 kg (259 lb)     Temp Readings from Last 3 Encounters:   24 99.1 °F (37.3 °C)   24 98.2 °F (36.8 °C) (Oral)   24 97.9 °F (36.6 °C)     BP Readings from Last 3 Encounters:   24 122/74   24 130/72   24 101/68     Pulse Readings from Last 3 Encounters:   24 83   24 82   24 87       \"Have you been to the ER, urgent care clinic since your last visit?  Hospitalized since your last visit?\"    NO    “Have you seen or consulted any other health care providers outside of [facility] since your last visit?”    NO

## 2024-06-29 LAB
T4 FREE SERPL-MCNC: 1.3 NG/DL (ref 0.8–1.5)
TSH SERPL DL<=0.05 MIU/L-ACNC: 1.54 UIU/ML (ref 0.36–3.74)

## 2024-08-12 PROCEDURE — 93295 DEV INTERROG REMOTE 1/2/MLT: CPT | Performed by: INTERNAL MEDICINE

## 2024-08-19 DIAGNOSIS — R60.0 LOCALIZED EDEMA: ICD-10-CM

## 2024-08-19 DIAGNOSIS — I50.9 HEART FAILURE, UNSPECIFIED (HCC): ICD-10-CM

## 2024-08-19 RX ORDER — TORSEMIDE 20 MG/1
40 TABLET ORAL DAILY
Qty: 180 TABLET | Refills: 1 | Status: SHIPPED | OUTPATIENT
Start: 2024-08-19

## 2024-08-19 NOTE — TELEPHONE ENCOUNTER
Requested Prescriptions     Signed Prescriptions Disp Refills    torsemide (DEMADEX) 20 MG tablet 180 tablet 1     Sig: TAKE 2 TABLETS BY MOUTH EVERY DAY     Authorizing Provider: WAYNE DOWD     Ordering User: GIDEON VASQUEZ MD    Future Appointments   Date Time Provider Department Center   10/23/2024 11:00 AM BS GHOSH VASCULAR [facility] BS AMB   10/23/2024 11:40 AM Wayne Dowd MD [facility] BS AMB   11/26/2024  3:40 PM DAVINA MILIAN AMB   11/26/2024  4:00 PM Mikel Peres MD [facility] BS AMB   12/18/2024  9:30 AM Unruly Duque MD RDE [facility] BS AMB

## 2024-09-06 DIAGNOSIS — E78.2 MIXED HYPERLIPIDEMIA: ICD-10-CM

## 2024-09-06 NOTE — TELEPHONE ENCOUNTER
Last appointment: 2/7/24 Eneida, lipid 2/2024  Next appointment: 10/17/24 Eneida  Previous refill encounter(s): 2/29/24 90 + 1    Requested Prescriptions     Pending Prescriptions Disp Refills    atorvastatin (LIPITOR) 20 MG tablet [Pharmacy Med Name: ATORVASTATIN 20 MG TABLET] 90 tablet 1     Sig: Take 1 tablet by mouth daily     For Pharmacy Admin Tracking Only    Program: Medication Refill  CPA in place:    Recommendation Provided To:   Intervention Detail: New Rx: 1, reason: Patient Preference  Intervention Accepted By:   Gap Closed?:    Time Spent (min): 5

## 2024-09-08 RX ORDER — ATORVASTATIN CALCIUM 20 MG/1
20 TABLET, FILM COATED ORAL DAILY
Qty: 90 TABLET | Refills: 1 | Status: SHIPPED | OUTPATIENT
Start: 2024-09-08

## 2024-09-16 DIAGNOSIS — E11.65 UNCONTROLLED TYPE 2 DIABETES MELLITUS WITH HYPERGLYCEMIA (HCC): ICD-10-CM

## 2024-09-18 RX ORDER — INSULIN DEGLUDEC 200 U/ML
56 INJECTION, SOLUTION SUBCUTANEOUS DAILY
Qty: 9 ADJUSTABLE DOSE PRE-FILLED PEN SYRINGE | Refills: 3 | Status: SHIPPED | OUTPATIENT
Start: 2024-09-18

## 2024-10-16 SDOH — ECONOMIC STABILITY: FOOD INSECURITY: WITHIN THE PAST 12 MONTHS, THE FOOD YOU BOUGHT JUST DIDN'T LAST AND YOU DIDN'T HAVE MONEY TO GET MORE.: NEVER TRUE

## 2024-10-16 SDOH — ECONOMIC STABILITY: TRANSPORTATION INSECURITY
IN THE PAST 12 MONTHS, HAS LACK OF TRANSPORTATION KEPT YOU FROM MEETINGS, WORK, OR FROM GETTING THINGS NEEDED FOR DAILY LIVING?: NO

## 2024-10-16 SDOH — ECONOMIC STABILITY: INCOME INSECURITY: HOW HARD IS IT FOR YOU TO PAY FOR THE VERY BASICS LIKE FOOD, HOUSING, MEDICAL CARE, AND HEATING?: SOMEWHAT HARD

## 2024-10-16 SDOH — HEALTH STABILITY: PHYSICAL HEALTH: ON AVERAGE, HOW MANY DAYS PER WEEK DO YOU ENGAGE IN MODERATE TO STRENUOUS EXERCISE (LIKE A BRISK WALK)?: 5 DAYS

## 2024-10-16 SDOH — ECONOMIC STABILITY: FOOD INSECURITY: WITHIN THE PAST 12 MONTHS, YOU WORRIED THAT YOUR FOOD WOULD RUN OUT BEFORE YOU GOT MONEY TO BUY MORE.: NEVER TRUE

## 2024-10-16 SDOH — HEALTH STABILITY: PHYSICAL HEALTH: ON AVERAGE, HOW MANY MINUTES DO YOU ENGAGE IN EXERCISE AT THIS LEVEL?: 30 MIN

## 2024-10-16 ASSESSMENT — PATIENT HEALTH QUESTIONNAIRE - PHQ9
10. IF YOU CHECKED OFF ANY PROBLEMS, HOW DIFFICULT HAVE THESE PROBLEMS MADE IT FOR YOU TO DO YOUR WORK, TAKE CARE OF THINGS AT HOME, OR GET ALONG WITH OTHER PEOPLE: SOMEWHAT DIFFICULT
8. MOVING OR SPEAKING SO SLOWLY THAT OTHER PEOPLE COULD HAVE NOTICED. OR THE OPPOSITE, BEING SO FIGETY OR RESTLESS THAT YOU HAVE BEEN MOVING AROUND A LOT MORE THAN USUAL: NOT AT ALL
SUM OF ALL RESPONSES TO PHQ QUESTIONS 1-9: 6
4. FEELING TIRED OR HAVING LITTLE ENERGY: NOT AT ALL
SUM OF ALL RESPONSES TO PHQ9 QUESTIONS 1 & 2: 0
SUM OF ALL RESPONSES TO PHQ QUESTIONS 1-9: 6
6. FEELING BAD ABOUT YOURSELF - OR THAT YOU ARE A FAILURE OR HAVE LET YOURSELF OR YOUR FAMILY DOWN: NOT AT ALL
2. FEELING DOWN, DEPRESSED OR HOPELESS: NOT AT ALL
5. POOR APPETITE OR OVEREATING: NOT AT ALL
9. THOUGHTS THAT YOU WOULD BE BETTER OFF DEAD, OR OF HURTING YOURSELF: NOT AT ALL
1. LITTLE INTEREST OR PLEASURE IN DOING THINGS: NOT AT ALL
7. TROUBLE CONCENTRATING ON THINGS, SUCH AS READING THE NEWSPAPER OR WATCHING TELEVISION: NEARLY EVERY DAY
SUM OF ALL RESPONSES TO PHQ QUESTIONS 1-9: 6
SUM OF ALL RESPONSES TO PHQ QUESTIONS 1-9: 6
3. TROUBLE FALLING OR STAYING ASLEEP: NEARLY EVERY DAY

## 2024-10-16 ASSESSMENT — LIFESTYLE VARIABLES
HOW OFTEN DO YOU HAVE A DRINK CONTAINING ALCOHOL: 1
HOW OFTEN DO YOU HAVE SIX OR MORE DRINKS ON ONE OCCASION: 1
HOW MANY STANDARD DRINKS CONTAINING ALCOHOL DO YOU HAVE ON A TYPICAL DAY: 0
HOW OFTEN DO YOU HAVE A DRINK CONTAINING ALCOHOL: NEVER
HOW MANY STANDARD DRINKS CONTAINING ALCOHOL DO YOU HAVE ON A TYPICAL DAY: PATIENT DOES NOT DRINK

## 2024-10-17 ENCOUNTER — OFFICE VISIT (OUTPATIENT)
Age: 44
End: 2024-10-17
Payer: MEDICARE

## 2024-10-17 VITALS
DIASTOLIC BLOOD PRESSURE: 66 MMHG | HEIGHT: 68 IN | SYSTOLIC BLOOD PRESSURE: 96 MMHG | RESPIRATION RATE: 16 BRPM | OXYGEN SATURATION: 98 % | TEMPERATURE: 97.8 F | HEART RATE: 73 BPM | BODY MASS INDEX: 40.65 KG/M2 | WEIGHT: 268.2 LBS

## 2024-10-17 DIAGNOSIS — G47.21 SLEEP DISORDER, CIRCADIAN, DELAYED SLEEP PHASE TYPE: ICD-10-CM

## 2024-10-17 DIAGNOSIS — Z00.00 MEDICARE ANNUAL WELLNESS VISIT, SUBSEQUENT: Primary | ICD-10-CM

## 2024-10-17 PROCEDURE — G8484 FLU IMMUNIZE NO ADMIN: HCPCS | Performed by: NURSE PRACTITIONER

## 2024-10-17 PROCEDURE — G0439 PPPS, SUBSEQ VISIT: HCPCS | Performed by: NURSE PRACTITIONER

## 2024-10-17 NOTE — PROGRESS NOTES
Chief Complaint   Patient presents with    Medicare AWV     \"Have you been to the ER, urgent care clinic since your last visit?  Hospitalized since your last visit?\"    NO    “Have you seen or consulted any other health care providers outside of [facility] since your last visit?”    NO

## 2024-10-17 NOTE — PROGRESS NOTES
Medicare Annual Wellness Visit    Alissa Juarez is here for Medicare AWV    Assessment & Plan   Medicare annual wellness visit, subsequent  Sleep disorder, circadian, delayed sleep phase type  -     Daridorexant HCl 25 MG TABS; Take 25 mg by mouth nightly Max Daily Amount: 25 mg, Disp-30 tablet, R-0Normal  Due to her underlying cardiac disease I would not recommend typical sleep aids including Ambien, Lunesta, diphenhydramine.  MDQ for bipolar disorder was negative on screening today.  Significant delayed sleep phase which should benefit from 30 days of Quviviq.  We have discussed follow-up with sleep psychology if no improvement.    Recommendations for Preventive Services Due: see orders and patient instructions/AVS.  Recommended screening schedule for the next 5-10 years is provided to the patient in written form: see Patient Instructions/AVS.     Return in about 1 year (around 10/17/2025) for Medicare Wellness Exam.     Subjective     Reports a longstanding history of insomnia, worsening over time.  She has attempted OTC treatments without benefit.  Due to her history of congestive heart failure she is hesitant on taking prescription medications.  She will get in bed at 9 PM.  She does not fall asleep until 3 AM.  She will awaken at 6 AM.  She does not nap.      Patient's complete Health Risk Assessment and screening values have been reviewed and are found in Flowsheets. The following problems were reviewed today and where indicated follow up appointments were made and/or referrals ordered.    Positive Risk Factor Screenings with Interventions:    Fall Risk:  Do you feel unsteady or are you worried about falling? : (!) yes  2 or more falls in past year?: no  Fall with injury in past year?: (!) yes     Interventions:    Reviewed medications, home hazards, visual acuity, and co-morbidities that can increase risk for falls     Depression:  PHQ-2 Score: 0  PHQ-9 Total Score: 6  Total Score Interpretation: 5-9 =

## 2024-10-23 ENCOUNTER — OFFICE VISIT (OUTPATIENT)
Age: 44
End: 2024-10-23
Payer: MEDICARE

## 2024-10-23 ENCOUNTER — ANCILLARY PROCEDURE (OUTPATIENT)
Age: 44
End: 2024-10-23
Payer: MEDICARE

## 2024-10-23 VITALS
SYSTOLIC BLOOD PRESSURE: 90 MMHG | WEIGHT: 262 LBS | HEIGHT: 68 IN | DIASTOLIC BLOOD PRESSURE: 60 MMHG | OXYGEN SATURATION: 99 % | BODY MASS INDEX: 39.71 KG/M2 | HEART RATE: 80 BPM

## 2024-10-23 DIAGNOSIS — Z98.890 H/O MITRAL VALVE REPAIR: ICD-10-CM

## 2024-10-23 DIAGNOSIS — Z87.891 HISTORY OF TOBACCO USE: ICD-10-CM

## 2024-10-23 DIAGNOSIS — Z95.810 ICD (IMPLANTABLE CARDIOVERTER-DEFIBRILLATOR) IN PLACE: ICD-10-CM

## 2024-10-23 DIAGNOSIS — I42.8 NON-ISCHEMIC CARDIOMYOPATHY (HCC): ICD-10-CM

## 2024-10-23 DIAGNOSIS — I42.8 NON-ISCHEMIC CARDIOMYOPATHY (HCC): Primary | ICD-10-CM

## 2024-10-23 LAB
ECHO AO ASC DIAM: 2.8 CM
ECHO AO ASCENDING AORTA INDEX: 1.22 CM/M2
ECHO AO ROOT DIAM: 3 CM
ECHO AO ROOT INDEX: 1.31 CM/M2
ECHO AV AREA PEAK VELOCITY: 2.4 CM2
ECHO AV AREA VTI: 2.3 CM2
ECHO AV AREA/BSA PEAK VELOCITY: 1 CM2/M2
ECHO AV AREA/BSA VTI: 1 CM2/M2
ECHO AV MEAN GRADIENT: 2 MMHG
ECHO AV MEAN VELOCITY: 0.7 M/S
ECHO AV PEAK GRADIENT: 5 MMHG
ECHO AV PEAK VELOCITY: 1.1 M/S
ECHO AV VELOCITY RATIO: 0.64
ECHO AV VTI: 22.5 CM
ECHO BSA: 2.39 M2
ECHO LA DIAMETER INDEX: 1.83 CM/M2
ECHO LA DIAMETER: 4.2 CM
ECHO LA TO AORTIC ROOT RATIO: 1.4
ECHO LA VOL A-L A2C: 66 ML (ref 22–52)
ECHO LA VOL A-L A4C: 80 ML (ref 22–52)
ECHO LA VOL BP: 65 ML (ref 22–52)
ECHO LA VOL MOD A2C: 59 ML (ref 22–52)
ECHO LA VOL MOD A4C: 68 ML (ref 22–52)
ECHO LA VOL/BSA BIPLANE: 28 ML/M2 (ref 16–34)
ECHO LA VOLUME AREA LENGTH: 75 ML
ECHO LA VOLUME INDEX A-L A2C: 29 ML/M2 (ref 16–34)
ECHO LA VOLUME INDEX A-L A4C: 35 ML/M2 (ref 16–34)
ECHO LA VOLUME INDEX AREA LENGTH: 33 ML/M2 (ref 16–34)
ECHO LA VOLUME INDEX MOD A2C: 26 ML/M2 (ref 16–34)
ECHO LA VOLUME INDEX MOD A4C: 30 ML/M2 (ref 16–34)
ECHO LV E' LATERAL VELOCITY: 11 CM/S
ECHO LV E' SEPTAL VELOCITY: 4.7 CM/S
ECHO LV EDV A2C: 143 ML
ECHO LV EDV A4C: 129 ML
ECHO LV EDV BP: 136 ML (ref 56–104)
ECHO LV EDV INDEX A4C: 56 ML/M2
ECHO LV EDV INDEX BP: 59 ML/M2
ECHO LV EDV NDEX A2C: 62 ML/M2
ECHO LV EJECTION FRACTION A2C: 58 %
ECHO LV EJECTION FRACTION A4C: 53 %
ECHO LV EJECTION FRACTION BIPLANE: 53 % (ref 55–100)
ECHO LV ESV A2C: 60 ML
ECHO LV ESV A4C: 61 ML
ECHO LV ESV BP: 64 ML (ref 19–49)
ECHO LV ESV INDEX A2C: 26 ML/M2
ECHO LV ESV INDEX A4C: 27 ML/M2
ECHO LV ESV INDEX BP: 28 ML/M2
ECHO LV FRACTIONAL SHORTENING: 31 % (ref 28–44)
ECHO LV INTERNAL DIMENSION DIASTOLE INDEX: 2.14 CM/M2
ECHO LV INTERNAL DIMENSION DIASTOLIC: 4.9 CM (ref 3.9–5.3)
ECHO LV INTERNAL DIMENSION SYSTOLIC INDEX: 1.48 CM/M2
ECHO LV INTERNAL DIMENSION SYSTOLIC: 3.4 CM
ECHO LV IVSD: 1.1 CM (ref 0.6–0.9)
ECHO LV MASS 2D: 188.1 G (ref 67–162)
ECHO LV MASS INDEX 2D: 82.1 G/M2 (ref 43–95)
ECHO LV POSTERIOR WALL DIASTOLIC: 1 CM (ref 0.6–0.9)
ECHO LV RELATIVE WALL THICKNESS RATIO: 0.41
ECHO LVOT AREA: 3.8 CM2
ECHO LVOT AV VTI INDEX: 0.62
ECHO LVOT DIAM: 2.2 CM
ECHO LVOT MEAN GRADIENT: 1 MMHG
ECHO LVOT PEAK GRADIENT: 2 MMHG
ECHO LVOT PEAK VELOCITY: 0.7 M/S
ECHO LVOT STROKE VOLUME INDEX: 23.2 ML/M2
ECHO LVOT SV: 53.2 ML
ECHO LVOT VTI: 14 CM
ECHO MV A VELOCITY: 0.95 M/S
ECHO MV AREA PHT: 2.4 CM2
ECHO MV AREA VTI: 1.5 CM2
ECHO MV E DECELERATION TIME (DT): 316.4 MS
ECHO MV E VELOCITY: 1.09 M/S
ECHO MV E/A RATIO: 1.15
ECHO MV E/E' LATERAL: 9.91
ECHO MV E/E' RATIO (AVERAGED): 16.55
ECHO MV E/E' SEPTAL: 23.19
ECHO MV LVOT VTI INDEX: 2.51
ECHO MV MAX VELOCITY: 1.3 M/S
ECHO MV MEAN GRADIENT: 3 MMHG
ECHO MV MEAN VELOCITY: 0.8 M/S
ECHO MV PEAK GRADIENT: 6 MMHG
ECHO MV PRESSURE HALF TIME (PHT): 91.8 MS
ECHO MV REGURGITANT VELOCITY PISA: 4.5 M/S
ECHO MV REGURGITANT VTIA: 145.9 CM
ECHO MV VTI: 35.2 CM
ECHO RV INTERNAL DIMENSION: 3.3 CM

## 2024-10-23 PROCEDURE — G8417 CALC BMI ABV UP PARAM F/U: HCPCS | Performed by: INTERNAL MEDICINE

## 2024-10-23 PROCEDURE — 99214 OFFICE O/P EST MOD 30 MIN: CPT | Performed by: INTERNAL MEDICINE

## 2024-10-23 PROCEDURE — 4004F PT TOBACCO SCREEN RCVD TLK: CPT | Performed by: INTERNAL MEDICINE

## 2024-10-23 PROCEDURE — G8484 FLU IMMUNIZE NO ADMIN: HCPCS | Performed by: INTERNAL MEDICINE

## 2024-10-23 PROCEDURE — 93306 TTE W/DOPPLER COMPLETE: CPT | Performed by: INTERNAL MEDICINE

## 2024-10-23 PROCEDURE — G8427 DOCREV CUR MEDS BY ELIG CLIN: HCPCS | Performed by: INTERNAL MEDICINE

## 2024-10-23 ASSESSMENT — PATIENT HEALTH QUESTIONNAIRE - PHQ9
SUM OF ALL RESPONSES TO PHQ QUESTIONS 1-9: 0
2. FEELING DOWN, DEPRESSED OR HOPELESS: NOT AT ALL
SUM OF ALL RESPONSES TO PHQ QUESTIONS 1-9: 0
SUM OF ALL RESPONSES TO PHQ QUESTIONS 1-9: 0
SUM OF ALL RESPONSES TO PHQ9 QUESTIONS 1 & 2: 0
SUM OF ALL RESPONSES TO PHQ QUESTIONS 1-9: 0
1. LITTLE INTEREST OR PLEASURE IN DOING THINGS: NOT AT ALL

## 2024-10-23 NOTE — PROGRESS NOTES
conditions(799.89), and Thyroid disease.    All other systems negative except as above.     PE  Vitals:    10/23/24 1120   BP: 90/60   Site: Left Upper Arm   Position: Sitting   Cuff Size: Large Adult   Pulse: 80   SpO2: 99%   Weight: 118.8 kg (262 lb)   Height: 1.727 m (5' 8\")        Body mass index is 39.84 kg/m².  General appearance - alert, well appearing, and in no distress  Mental status - affect appropriate to mood  Eyes - sclera anicteric, moist mucous membranes  Neck - supple, no JVD  Chest - clear to auscultation, no wheezes, rales or rhonchi  Heart - normal rate, regular rhythm, normal S1, S2, I/VI murmur  Abdomen - soft, nontender, nondistended, no masses or organomegaly  Extremities - trace chronic LE edema      Recent Labs:  Lab Results   Component Value Date/Time    CHOL 186 02/07/2024 12:30 PM    HDL 46 02/07/2024 12:30 PM    .6 02/07/2024 12:30 PM     No results found for: \"MINDI\", \"CREAPOC\"  Lab Results   Component Value Date/Time    BUN 6 02/07/2024 12:30 PM     Lab Results   Component Value Date/Time    K 3.4 02/07/2024 12:30 PM     No results found for: \"HBA1C\"  Lab Results   Component Value Date/Time    HGB 11.9 02/07/2024 12:30 PM     Lab Results   Component Value Date/Time     02/07/2024 12:30 PM       Reviewed:  Past Medical History:   Diagnosis Date    Congestive heart disease (HCC)     Diabetes (HCC)     Endocrine disease     Grave's Disease    Other ill-defined conditions(799.89)     graves    Other ill-defined conditions(799.89)     migraines    Thyroid disease      Social History     Tobacco Use   Smoking Status Every Day    Current packs/day: 0.25    Average packs/day: 0.3 packs/day for 20.0 years (5.0 ttl pk-yrs)    Types: Cigarettes   Smokeless Tobacco Never     Social History     Substance and Sexual Activity   Alcohol Use Yes    Comment: Pt states maybe once a year     Allergies   Allergen Reactions    Metformin Other (See Comments)     Significant fluid retention and

## 2024-11-13 PROCEDURE — 93295 DEV INTERROG REMOTE 1/2/MLT: CPT | Performed by: INTERNAL MEDICINE

## 2024-11-26 ENCOUNTER — PROCEDURE VISIT (OUTPATIENT)
Age: 44
End: 2024-11-26
Payer: MEDICARE

## 2024-11-26 ENCOUNTER — OFFICE VISIT (OUTPATIENT)
Age: 44
End: 2024-11-26
Payer: MEDICARE

## 2024-11-26 VITALS
HEIGHT: 68 IN | WEIGHT: 262 LBS | SYSTOLIC BLOOD PRESSURE: 100 MMHG | DIASTOLIC BLOOD PRESSURE: 60 MMHG | RESPIRATION RATE: 16 BRPM | BODY MASS INDEX: 39.71 KG/M2 | OXYGEN SATURATION: 98 % | HEART RATE: 78 BPM

## 2024-11-26 DIAGNOSIS — R60.0 BILATERAL LOWER EXTREMITY EDEMA: ICD-10-CM

## 2024-11-26 DIAGNOSIS — I34.0 NONRHEUMATIC MITRAL (VALVE) INSUFFICIENCY: ICD-10-CM

## 2024-11-26 DIAGNOSIS — I50.22 CHRONIC SYSTOLIC CHF (CONGESTIVE HEART FAILURE), NYHA CLASS 2 (HCC): ICD-10-CM

## 2024-11-26 DIAGNOSIS — I10 ESSENTIAL (PRIMARY) HYPERTENSION: ICD-10-CM

## 2024-11-26 DIAGNOSIS — Z95.810 ICD (IMPLANTABLE CARDIOVERTER-DEFIBRILLATOR) IN PLACE: Primary | ICD-10-CM

## 2024-11-26 DIAGNOSIS — E66.01 MORBID (SEVERE) OBESITY DUE TO EXCESS CALORIES: ICD-10-CM

## 2024-11-26 DIAGNOSIS — I42.8 NICM (NONISCHEMIC CARDIOMYOPATHY) (HCC): ICD-10-CM

## 2024-11-26 DIAGNOSIS — I47.19 PAT (PAROXYSMAL ATRIAL TACHYCARDIA) (HCC): ICD-10-CM

## 2024-11-26 DIAGNOSIS — Z98.890 HISTORY OF MITRAL VALVE REPAIR: ICD-10-CM

## 2024-11-26 DIAGNOSIS — I87.2 VENOUS INSUFFICIENCY: ICD-10-CM

## 2024-11-26 PROCEDURE — G8417 CALC BMI ABV UP PARAM F/U: HCPCS | Performed by: INTERNAL MEDICINE

## 2024-11-26 PROCEDURE — 99214 OFFICE O/P EST MOD 30 MIN: CPT | Performed by: INTERNAL MEDICINE

## 2024-11-26 PROCEDURE — 3074F SYST BP LT 130 MM HG: CPT | Performed by: INTERNAL MEDICINE

## 2024-11-26 PROCEDURE — 93282 PRGRMG EVAL IMPLANTABLE DFB: CPT | Performed by: INTERNAL MEDICINE

## 2024-11-26 PROCEDURE — 4004F PT TOBACCO SCREEN RCVD TLK: CPT | Performed by: INTERNAL MEDICINE

## 2024-11-26 PROCEDURE — 3078F DIAST BP <80 MM HG: CPT | Performed by: INTERNAL MEDICINE

## 2024-11-26 PROCEDURE — G8427 DOCREV CUR MEDS BY ELIG CLIN: HCPCS | Performed by: INTERNAL MEDICINE

## 2024-11-26 PROCEDURE — G8484 FLU IMMUNIZE NO ADMIN: HCPCS | Performed by: INTERNAL MEDICINE

## 2024-11-26 ASSESSMENT — PATIENT HEALTH QUESTIONNAIRE - PHQ9
SUM OF ALL RESPONSES TO PHQ QUESTIONS 1-9: 0
1. LITTLE INTEREST OR PLEASURE IN DOING THINGS: NOT AT ALL
SUM OF ALL RESPONSES TO PHQ QUESTIONS 1-9: 0
SUM OF ALL RESPONSES TO PHQ9 QUESTIONS 1 & 2: 0
2. FEELING DOWN, DEPRESSED OR HOPELESS: NOT AT ALL
SUM OF ALL RESPONSES TO PHQ QUESTIONS 1-9: 0
SUM OF ALL RESPONSES TO PHQ QUESTIONS 1-9: 0

## 2024-11-26 NOTE — PROGRESS NOTES
Mother           OBJECTIVE:     Physical Exam       BP: 100/60   Pulse: 78   Resp: 16   SpO2: 98%   Weight: 118.8 kg (262 lb)   Height: 1.727 m (5' 8\")     General:  Alert, cooperative, no distress, appears stated age.   Neck:  Supple, no JVD.   Back:    Symmetric.   Lungs:    Clear to auscultation bilaterally.   Heart:  Regular rate and rhythm.  No murmur, click, rub or gallop.   Abdomen:    Soft obese  MSK:  Extremities normal, atraumatic.  Moves extremities independently.   Vasc/lymph: + lower extremity edema.   Skin:  Skin color normal. No rashes or lesions on visible areas.  Left chest ICD site well healed.   Neurologic:  Alert, moves all extremities.       Data Review:     Radiology:   XR Results (most recent):     CT Result (most recent):   CT ABDOMEN PELVIS WO IV CONTRAST 01/26/2024     Narrative   EXAM: CT ABDOMEN PELVIS WO CONTRAST     INDICATION: altered mental status, weight loss, epigastric abdominal pain     COMPARISON: CT 3/1/2011.     IV CONTRAST: None.     ORAL CONTRAST: None.     TECHNIQUE:   Thin axial images were obtained through the abdomen and pelvis. Coronal and   sagittal reformats were generated. CT dose reduction was achieved through use of   a standardized protocol tailored for this examination and automatic exposure   control for dose modulation.     The absence of intravenous contrast material reduces the sensitivity for   evaluation of the vasculature and solid organs.     FINDINGS:   LOWER THORAX: The visualized lung bases are clear. The visualized heart is   normal in size without pericardial effusion. Coronary artery calcifications,   pacemaker/AICD lead, and mitral valve annular prosthesis are noted.   LIVER: No mass.   BILIARY TREE: Gallbladder is within normal limits. CBD is not dilated.   SPLEEN: Unremarkable.   PANCREAS: No focal abnormality.   ADRENALS: Unremarkable.   KIDNEYS/URETERS: No calculus or hydronephrosis.   STOMACH: Unremarkable.   SMALL BOWEL: No dilatation or wall

## 2024-12-04 DIAGNOSIS — E03.9 ACQUIRED HYPOTHYROIDISM: ICD-10-CM

## 2024-12-04 RX ORDER — LEVOTHYROXINE SODIUM 100 UG/1
100 TABLET ORAL DAILY
Qty: 90 TABLET | Refills: 3 | Status: SHIPPED | OUTPATIENT
Start: 2024-12-04

## 2024-12-05 ENCOUNTER — TELEPHONE (OUTPATIENT)
Age: 44
End: 2024-12-05

## 2024-12-05 RX ORDER — SACUBITRIL AND VALSARTAN 24; 26 MG/1; MG/1
1 TABLET, FILM COATED ORAL 2 TIMES DAILY
Qty: 180 TABLET | Refills: 3 | Status: SHIPPED | OUTPATIENT
Start: 2024-12-05

## 2024-12-18 ENCOUNTER — OFFICE VISIT (OUTPATIENT)
Age: 44
End: 2024-12-18
Payer: MEDICARE

## 2024-12-18 VITALS
SYSTOLIC BLOOD PRESSURE: 109 MMHG | HEART RATE: 80 BPM | HEIGHT: 68 IN | DIASTOLIC BLOOD PRESSURE: 70 MMHG | BODY MASS INDEX: 41.22 KG/M2 | WEIGHT: 272 LBS

## 2024-12-18 DIAGNOSIS — E10.9 KETOSIS PRONE DIABETES (HCC): ICD-10-CM

## 2024-12-18 DIAGNOSIS — E10.65 TYPE 1 DIABETES MELLITUS WITH HYPERGLYCEMIA (HCC): Primary | ICD-10-CM

## 2024-12-18 LAB — HBA1C MFR BLD: 6.4 %

## 2024-12-18 PROCEDURE — 95251 CONT GLUC MNTR ANALYSIS I&R: CPT | Performed by: INTERNAL MEDICINE

## 2024-12-18 PROCEDURE — G8428 CUR MEDS NOT DOCUMENT: HCPCS | Performed by: INTERNAL MEDICINE

## 2024-12-18 PROCEDURE — G8484 FLU IMMUNIZE NO ADMIN: HCPCS | Performed by: INTERNAL MEDICINE

## 2024-12-18 PROCEDURE — G8417 CALC BMI ABV UP PARAM F/U: HCPCS | Performed by: INTERNAL MEDICINE

## 2024-12-18 PROCEDURE — 99214 OFFICE O/P EST MOD 30 MIN: CPT | Performed by: INTERNAL MEDICINE

## 2024-12-18 PROCEDURE — 3046F HEMOGLOBIN A1C LEVEL >9.0%: CPT | Performed by: INTERNAL MEDICINE

## 2024-12-18 PROCEDURE — 83036 HEMOGLOBIN GLYCOSYLATED A1C: CPT | Performed by: INTERNAL MEDICINE

## 2024-12-18 PROCEDURE — 2022F DILAT RTA XM EVC RTNOPTHY: CPT | Performed by: INTERNAL MEDICINE

## 2024-12-18 PROCEDURE — 4004F PT TOBACCO SCREEN RCVD TLK: CPT | Performed by: INTERNAL MEDICINE

## 2024-12-18 NOTE — PROGRESS NOTES
This is a 44-year-old woman with a history of nonischemic cardiomyopathy status post mitral valve repair at [facility] who presented to [facility] in 2022 with a blood sugar of 990.  Patient was treated with IV insulin and was transitioned to insulin therapy subcutaneously.DEANN Ab negative in 2022. She was discharged from [facility] on Lantus and Humalog.  In March 2023, her insulin requirements fell dramatically and her A1c fell to 5.0%.  She was taken off insulin and started on Trulicity which she took for 6 weeks but continued to have episodes of hypoglycemia so this medication was discontinued.  She was then off all antihyperglycemic therapy from March 2023 until January 2024 when she presented to [facility].  Labs at her presentation included an A1c of greater than 14%, glucose 1124, bicarb 8, anion gap 34, creat 2.68, venous pH 7.24, urine ketones 4+.  She was treated with intravenous insulin according to the DKA protocol.  She reported a 40 pound weight loss at the time of the presentation in January 2024.  She was seen by [facility] and then transitioned off the insulin drip onto basal bolus insulin.  She was discharged from [facility] on 56 units of Lantus and 11 units of Humalog.  I saw her in clinic February 2024. I again considered possible Type 1 and repeated her DEANN antibodies which were again negative. She then called me in early May 2024 with episodes of recurrent hypoglycemia almost identical to the situation in 2022 and we again stopped the insulin. 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  Her A1c today is 6.4%.     Past Medical History  Graves' disease status post radioactive iodine treatment with subsequent hypothyroidism.  Her levothyroxine has gradually been increased from 50 ug and was recently increased to 100 mcg daily.  She has a history of nonischemic cardiomyopathy with a recent EF of 55% (up from 13%), followed at [facility]. 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

## 2025-01-03 ENCOUNTER — HOSPITAL ENCOUNTER (EMERGENCY)
Facility: HOSPITAL | Age: 45
Discharge: HOME | End: 2025-01-03
Attending: STUDENT IN AN ORGANIZED HEALTH CARE EDUCATION/TRAINING PROGRAM
Payer: MEDICARE

## 2025-01-03 ENCOUNTER — APPOINTMENT (OUTPATIENT)
Facility: HOSPITAL | Age: 45
End: 2025-01-03
Payer: MEDICARE

## 2025-01-03 VITALS
SYSTOLIC BLOOD PRESSURE: 94 MMHG | OXYGEN SATURATION: 100 % | WEIGHT: 270 LBS | HEART RATE: 82 BPM | TEMPERATURE: 98.6 F | HEIGHT: 68 IN | BODY MASS INDEX: 40.92 KG/M2 | RESPIRATION RATE: 19 BRPM | DIASTOLIC BLOOD PRESSURE: 78 MMHG

## 2025-01-03 DIAGNOSIS — J18.9 ATYPICAL PNEUMONIA: Primary | ICD-10-CM

## 2025-01-03 LAB
ALBUMIN SERPL-MCNC: 3.5 G/DL (ref 3.5–5)
ALBUMIN/GLOB SERPL: 0.7 (ref 1.1–2.2)
ALP SERPL-CCNC: 96 U/L (ref 45–117)
ALT SERPL-CCNC: 28 U/L (ref 12–78)
ANION GAP SERPL CALC-SCNC: 5 MMOL/L (ref 2–12)
AST SERPL-CCNC: 27 U/L (ref 15–37)
BASOPHILS # BLD: 0 K/UL (ref 0–0.1)
BASOPHILS NFR BLD: 1 % (ref 0–1)
BILIRUB SERPL-MCNC: 0.9 MG/DL (ref 0.2–1)
BUN SERPL-MCNC: 9 MG/DL (ref 6–20)
BUN/CREAT SERPL: 9 (ref 12–20)
CALCIUM SERPL-MCNC: 9.2 MG/DL (ref 8.5–10.1)
CHLORIDE SERPL-SCNC: 104 MMOL/L (ref 97–108)
CO2 SERPL-SCNC: 26 MMOL/L (ref 21–32)
CREAT SERPL-MCNC: 1.04 MG/DL (ref 0.55–1.02)
DIFFERENTIAL METHOD BLD: ABNORMAL
EKG ATRIAL RATE: 88 BPM
EKG DIAGNOSIS: NORMAL
EKG P AXIS: 26 DEGREES
EKG P-R INTERVAL: 164 MS
EKG Q-T INTERVAL: 368 MS
EKG QRS DURATION: 82 MS
EKG QTC CALCULATION (BAZETT): 445 MS
EKG R AXIS: 77 DEGREES
EKG T AXIS: 59 DEGREES
EKG VENTRICULAR RATE: 88 BPM
EOSINOPHIL # BLD: 0.1 K/UL (ref 0–0.4)
EOSINOPHIL NFR BLD: 2 % (ref 0–7)
ERYTHROCYTE [DISTWIDTH] IN BLOOD BY AUTOMATED COUNT: 15.4 % (ref 11.5–14.5)
FLUAV RNA SPEC QL NAA+PROBE: NOT DETECTED
FLUBV RNA SPEC QL NAA+PROBE: NOT DETECTED
GLOBULIN SER CALC-MCNC: 4.7 G/DL (ref 2–4)
GLUCOSE SERPL-MCNC: 108 MG/DL (ref 65–100)
HCT VFR BLD AUTO: 37.9 % (ref 35–47)
HGB BLD-MCNC: 12.8 G/DL (ref 11.5–16)
IMM GRANULOCYTES # BLD AUTO: 0 K/UL (ref 0–0.04)
IMM GRANULOCYTES NFR BLD AUTO: 0 % (ref 0–0.5)
LYMPHOCYTES # BLD: 2.5 K/UL (ref 0.8–3.5)
LYMPHOCYTES NFR BLD: 38 % (ref 12–49)
MCH RBC QN AUTO: 28.8 PG (ref 26–34)
MCHC RBC AUTO-ENTMCNC: 33.8 G/DL (ref 30–36.5)
MCV RBC AUTO: 85.4 FL (ref 80–99)
MONOCYTES # BLD: 0.7 K/UL (ref 0–1)
MONOCYTES NFR BLD: 11 % (ref 5–13)
NEUTS SEG # BLD: 3.2 K/UL (ref 1.8–8)
NEUTS SEG NFR BLD: 48 % (ref 32–75)
NRBC # BLD: 0 K/UL (ref 0–0.01)
NRBC BLD-RTO: 0 PER 100 WBC
NT PRO BNP: 53 PG/ML
PLATELET # BLD AUTO: 281 K/UL (ref 150–400)
PMV BLD AUTO: 9 FL (ref 8.9–12.9)
POTASSIUM SERPL-SCNC: 3.7 MMOL/L (ref 3.5–5.1)
PROT SERPL-MCNC: 8.2 G/DL (ref 6.4–8.2)
RBC # BLD AUTO: 4.44 M/UL (ref 3.8–5.2)
SARS-COV-2 RNA RESP QL NAA+PROBE: NOT DETECTED
SODIUM SERPL-SCNC: 135 MMOL/L (ref 136–145)
SOURCE: NORMAL
TROPONIN I SERPL HS-MCNC: 8 NG/L (ref 0–51)
WBC # BLD AUTO: 6.6 K/UL (ref 3.6–11)

## 2025-01-03 PROCEDURE — 96374 THER/PROPH/DIAG INJ IV PUSH: CPT

## 2025-01-03 PROCEDURE — 80053 COMPREHEN METABOLIC PANEL: CPT

## 2025-01-03 PROCEDURE — 93005 ELECTROCARDIOGRAM TRACING: CPT | Performed by: STUDENT IN AN ORGANIZED HEALTH CARE EDUCATION/TRAINING PROGRAM

## 2025-01-03 PROCEDURE — 71046 X-RAY EXAM CHEST 2 VIEWS: CPT

## 2025-01-03 PROCEDURE — 36415 COLL VENOUS BLD VENIPUNCTURE: CPT

## 2025-01-03 PROCEDURE — 6370000000 HC RX 637 (ALT 250 FOR IP): Performed by: STUDENT IN AN ORGANIZED HEALTH CARE EDUCATION/TRAINING PROGRAM

## 2025-01-03 PROCEDURE — 84484 ASSAY OF TROPONIN QUANT: CPT

## 2025-01-03 PROCEDURE — 85025 COMPLETE CBC W/AUTO DIFF WBC: CPT

## 2025-01-03 PROCEDURE — 99285 EMERGENCY DEPT VISIT HI MDM: CPT

## 2025-01-03 PROCEDURE — 94640 AIRWAY INHALATION TREATMENT: CPT

## 2025-01-03 PROCEDURE — 87636 SARSCOV2 & INF A&B AMP PRB: CPT

## 2025-01-03 PROCEDURE — 6360000002 HC RX W HCPCS: Performed by: STUDENT IN AN ORGANIZED HEALTH CARE EDUCATION/TRAINING PROGRAM

## 2025-01-03 PROCEDURE — 83880 ASSAY OF NATRIURETIC PEPTIDE: CPT

## 2025-01-03 RX ORDER — FLUCONAZOLE 150 MG/1
150 TABLET ORAL ONCE
Qty: 1 TABLET | Refills: 0 | Status: SHIPPED | OUTPATIENT
Start: 2025-01-03 | End: 2025-01-03

## 2025-01-03 RX ORDER — ONDANSETRON 2 MG/ML
4 INJECTION INTRAMUSCULAR; INTRAVENOUS ONCE
Status: COMPLETED | OUTPATIENT
Start: 2025-01-03 | End: 2025-01-03

## 2025-01-03 RX ORDER — ALBUTEROL SULFATE 90 UG/1
2 INHALANT RESPIRATORY (INHALATION) 4 TIMES DAILY PRN
Qty: 54 G | Refills: 0 | Status: SHIPPED | OUTPATIENT
Start: 2025-01-03

## 2025-01-03 RX ORDER — ACETAMINOPHEN 500 MG
1000 TABLET ORAL
Status: COMPLETED | OUTPATIENT
Start: 2025-01-03 | End: 2025-01-03

## 2025-01-03 RX ORDER — HYDROCODONE POLISTIREX AND CHLORPHENIRAMINE POLISTIREX 10; 8 MG/5ML; MG/5ML
5 SUSPENSION, EXTENDED RELEASE ORAL
Status: COMPLETED | OUTPATIENT
Start: 2025-01-03 | End: 2025-01-03

## 2025-01-03 RX ORDER — GUAIFENESIN/DEXTROMETHORPHAN 100-10MG/5
10 SYRUP ORAL 3 TIMES DAILY PRN
Qty: 120 ML | Refills: 0 | Status: SHIPPED | OUTPATIENT
Start: 2025-01-03 | End: 2025-01-13

## 2025-01-03 RX ORDER — IPRATROPIUM BROMIDE AND ALBUTEROL SULFATE 2.5; .5 MG/3ML; MG/3ML
1 SOLUTION RESPIRATORY (INHALATION)
Status: COMPLETED | OUTPATIENT
Start: 2025-01-03 | End: 2025-01-03

## 2025-01-03 RX ORDER — AZITHROMYCIN 250 MG/1
TABLET, FILM COATED ORAL
Qty: 6 TABLET | Refills: 0 | Status: SHIPPED | OUTPATIENT
Start: 2025-01-03 | End: 2025-01-13

## 2025-01-03 RX ADMIN — ONDANSETRON 4 MG: 2 INJECTION INTRAMUSCULAR; INTRAVENOUS at 11:32

## 2025-01-03 RX ADMIN — HYDROCODONE POLISTIREX AND CHLORPHENIRAMINE POLISTIREX 5 ML: 10; 8 SUSPENSION, EXTENDED RELEASE ORAL at 11:52

## 2025-01-03 RX ADMIN — IPRATROPIUM BROMIDE AND ALBUTEROL SULFATE 1 DOSE: .5; 3 SOLUTION RESPIRATORY (INHALATION) at 11:45

## 2025-01-03 RX ADMIN — ACETAMINOPHEN 1000 MG: 500 TABLET, FILM COATED ORAL at 11:32

## 2025-01-03 ASSESSMENT — PAIN SCALES - GENERAL: PAINLEVEL_OUTOF10: 6

## 2025-01-03 NOTE — ED PROVIDER NOTES
Differential Type AUTOMATED     Troponin    Collection Time: 01/03/25 11:24 AM   Result Value Ref Range    Troponin, High Sensitivity 8 0 - 51 ng/L   Comprehensive Metabolic Panel    Collection Time: 01/03/25 11:24 AM   Result Value Ref Range    Sodium 135 (L) 136 - 145 mmol/L    Potassium 3.7 3.5 - 5.1 mmol/L    Chloride 104 97 - 108 mmol/L    CO2 26 21 - 32 mmol/L    Anion Gap 5 2 - 12 mmol/L    Glucose 108 (H) 65 - 100 mg/dL    BUN 9 6 - 20 MG/DL    Creatinine 1.04 (H) 0.55 - 1.02 MG/DL    BUN/Creatinine Ratio 9 (L) 12 - 20      Est, Glom Filt Rate 67 >60 ml/min/1.73m2    Calcium 9.2 8.5 - 10.1 MG/DL    Total Bilirubin 0.9 0.2 - 1.0 MG/DL    ALT 28 12 - 78 U/L    AST 27 15 - 37 U/L    Alk Phosphatase 96 45 - 117 U/L    Total Protein 8.2 6.4 - 8.2 g/dL    Albumin 3.5 3.5 - 5.0 g/dL    Globulin 4.7 (H) 2.0 - 4.0 g/dL    Albumin/Globulin Ratio 0.7 (L) 1.1 - 2.2     COVID-19 & Influenza Combo    Collection Time: 01/03/25 11:24 AM    Specimen: Nasopharyngeal   Result Value Ref Range    Source Nasopharyngeal      SARS-CoV-2, PCR Not detected NOTD      Rapid Influenza A By PCR Not detected NOTD      Rapid Influenza B By PCR Not detected NOTD     Brain Natriuretic Peptide    Collection Time: 01/03/25 11:24 AM   Result Value Ref Range    NT Pro-BNP 53 <125 PG/ML           EKG: Normal sinus rhythm, heart rate 88 bpm, normal axis, normal intervals, non-STEMI, EKG interpreted by me.     RADIOLOGY:  Non-plain film images such as CT, Ultrasound and MRI are read by the radiologist. Plain radiographic images are visualized and preliminarily interpreted by the ED Provider with the below findings:        Interpretation per the Radiologist below, if available at the time of this note:     XR CHEST (2 VW)   Final Result   Clear lungs.      Electronically signed by Emmanuel Del Angel              PROCEDURES   Unless otherwise noted below, none  Procedures            EMERGENCY DEPARTMENT COURSE and DIFFERENTIAL DIAGNOSIS/MDM  apply route every 10 days            ASK your doctor about these medications      atorvastatin 20 MG tablet  Commonly known as: LIPITOR  Take 1 tablet by mouth daily     blood glucose test strips strip  Commonly known as: ASCENSIA AUTODISC VI;ONE TOUCH ULTRA TEST VI     Daridorexant HCl 25 MG Tabs  Take 25 mg by mouth nightly Max Daily Amount: 25 mg     Entresto 24-26 MG per tablet  Generic drug: sacubitril-valsartan  Take 1 tablet by mouth 2 times daily     levothyroxine 100 MCG tablet  Commonly known as: SYNTHROID  TAKE 1 TABLET BY MOUTH EVERY DAY     Sennosides 8.6 MG Caps     torsemide 20 MG tablet  Commonly known as: DEMADEX  TAKE 2 TABLETS BY MOUTH EVERY DAY     Tresiba FlexTouch 200 UNIT/ML Sopn  Generic drug: Insulin Degludec  Inject 56 Units into the skin daily               Where to Get Your Medications        These medications were sent to [facility]/pharmacy #[id number] - [place], [place] - [address] -  [phone] - F [phone]  [address] [address]      Phone: [phone]   albuterol sulfate  (90 Base) MCG/ACT inhaler  azithromycin 250 MG tablet  fluconazole 150 MG tablet  guaiFENesin-dextromethorphan 100-10 MG/5ML syrup           DISCONTINUED MEDICATIONS:  Discharge Medication List as of 1/3/2025 12:19 PM          I am the Primary Clinician of Record.   Flaco Garrett MD (electronically signed)      (Please note that parts of this dictation were completed with voice recognition software. Quite often unanticipated grammatical, syntax, homophones, and other interpretive errors are inadvertently transcribed by the computer software. Please disregards these errors. Please excuse any errors that have escaped final proofreading.)         Flaco Garrett MD  01/03/25 0904

## 2025-01-06 LAB
EKG ATRIAL RATE: 88 BPM
EKG DIAGNOSIS: NORMAL
EKG P AXIS: 26 DEGREES
EKG P-R INTERVAL: 164 MS
EKG Q-T INTERVAL: 368 MS
EKG QRS DURATION: 82 MS
EKG QTC CALCULATION (BAZETT): 445 MS
EKG R AXIS: 77 DEGREES
EKG T AXIS: 59 DEGREES
EKG VENTRICULAR RATE: 88 BPM

## 2025-01-22 ENCOUNTER — HOSPITAL ENCOUNTER (EMERGENCY)
Facility: HOSPITAL | Age: 45
Discharge: HOME | End: 2025-01-22
Payer: MEDICARE

## 2025-01-22 ENCOUNTER — APPOINTMENT (OUTPATIENT)
Facility: HOSPITAL | Age: 45
End: 2025-01-22
Payer: MEDICARE

## 2025-01-22 VITALS
HEIGHT: 68 IN | SYSTOLIC BLOOD PRESSURE: 98 MMHG | DIASTOLIC BLOOD PRESSURE: 62 MMHG | OXYGEN SATURATION: 98 % | WEIGHT: 269.4 LBS | HEART RATE: 82 BPM | RESPIRATION RATE: 28 BRPM | TEMPERATURE: 98.4 F | BODY MASS INDEX: 40.83 KG/M2

## 2025-01-22 DIAGNOSIS — R07.89 CHEST WALL PAIN: ICD-10-CM

## 2025-01-22 DIAGNOSIS — R05.3 CHRONIC COUGH: Primary | ICD-10-CM

## 2025-01-22 LAB
ALBUMIN SERPL-MCNC: 3.5 G/DL (ref 3.5–5)
ALBUMIN/GLOB SERPL: 0.7 (ref 1.1–2.2)
ALP SERPL-CCNC: 86 U/L (ref 45–117)
ALT SERPL-CCNC: 42 U/L (ref 12–78)
ANION GAP SERPL CALC-SCNC: 11 MMOL/L (ref 2–12)
AST SERPL-CCNC: 49 U/L (ref 15–37)
BASOPHILS # BLD: 0.02 K/UL (ref 0–0.1)
BASOPHILS NFR BLD: 0.4 % (ref 0–1)
BILIRUB SERPL-MCNC: 0.6 MG/DL (ref 0.2–1)
BUN SERPL-MCNC: 13 MG/DL (ref 6–20)
BUN/CREAT SERPL: 11 (ref 12–20)
CALCIUM SERPL-MCNC: 9 MG/DL (ref 8.5–10.1)
CHLORIDE SERPL-SCNC: 103 MMOL/L (ref 97–108)
CO2 SERPL-SCNC: 21 MMOL/L (ref 21–32)
CREAT SERPL-MCNC: 1.17 MG/DL (ref 0.55–1.02)
D DIMER PPP FEU-MCNC: 1.69 MG/L FEU (ref 0–0.65)
DIFFERENTIAL METHOD BLD: ABNORMAL
EOSINOPHIL # BLD: 0.01 K/UL (ref 0–0.4)
EOSINOPHIL NFR BLD: 0.2 % (ref 0–7)
ERYTHROCYTE [DISTWIDTH] IN BLOOD BY AUTOMATED COUNT: 15.9 % (ref 11.5–14.5)
GLOBULIN SER CALC-MCNC: 5 G/DL (ref 2–4)
GLUCOSE SERPL-MCNC: 120 MG/DL (ref 65–100)
HCT VFR BLD AUTO: 37.3 % (ref 35–47)
HGB BLD-MCNC: 12.5 G/DL (ref 11.5–16)
IMM GRANULOCYTES # BLD AUTO: 0.02 K/UL (ref 0–0.04)
IMM GRANULOCYTES NFR BLD AUTO: 0.4 % (ref 0–0.5)
LACTATE BLD-SCNC: 0.96 MMOL/L (ref 0.4–2)
LYMPHOCYTES # BLD: 1.23 K/UL (ref 0.8–3.5)
LYMPHOCYTES NFR BLD: 22.7 % (ref 12–49)
MCH RBC QN AUTO: 29.1 PG (ref 26–34)
MCHC RBC AUTO-ENTMCNC: 33.5 G/DL (ref 30–36.5)
MCV RBC AUTO: 86.9 FL (ref 80–99)
MONOCYTES # BLD: 0.65 K/UL (ref 0–1)
MONOCYTES NFR BLD: 12 % (ref 5–13)
NEUTS SEG # BLD: 3.49 K/UL (ref 1.8–8)
NEUTS SEG NFR BLD: 64.3 % (ref 32–75)
NRBC # BLD: 0 K/UL (ref 0–0.01)
NRBC BLD-RTO: 0 PER 100 WBC
NT PRO BNP: 60 PG/ML
PLATELET # BLD AUTO: 259 K/UL (ref 150–400)
PMV BLD AUTO: 9.6 FL (ref 8.9–12.9)
POTASSIUM SERPL-SCNC: 3.7 MMOL/L (ref 3.5–5.1)
PROT SERPL-MCNC: 8.5 G/DL (ref 6.4–8.2)
RBC # BLD AUTO: 4.29 M/UL (ref 3.8–5.2)
SODIUM SERPL-SCNC: 135 MMOL/L (ref 136–145)
TROPONIN I SERPL HS-MCNC: 13 NG/L (ref 0–51)
WBC # BLD AUTO: 5.4 K/UL (ref 3.6–11)

## 2025-01-22 PROCEDURE — 83605 ASSAY OF LACTIC ACID: CPT

## 2025-01-22 PROCEDURE — 80053 COMPREHEN METABOLIC PANEL: CPT

## 2025-01-22 PROCEDURE — 83880 ASSAY OF NATRIURETIC PEPTIDE: CPT

## 2025-01-22 PROCEDURE — 93005 ELECTROCARDIOGRAM TRACING: CPT | Performed by: EMERGENCY MEDICINE

## 2025-01-22 PROCEDURE — 6360000004 HC RX CONTRAST MEDICATION

## 2025-01-22 PROCEDURE — 85025 COMPLETE CBC W/AUTO DIFF WBC: CPT

## 2025-01-22 PROCEDURE — 71275 CT ANGIOGRAPHY CHEST: CPT

## 2025-01-22 PROCEDURE — 6360000002 HC RX W HCPCS

## 2025-01-22 PROCEDURE — 2580000003 HC RX 258

## 2025-01-22 PROCEDURE — 96374 THER/PROPH/DIAG INJ IV PUSH: CPT

## 2025-01-22 PROCEDURE — 99285 EMERGENCY DEPT VISIT HI MDM: CPT

## 2025-01-22 PROCEDURE — 84484 ASSAY OF TROPONIN QUANT: CPT

## 2025-01-22 PROCEDURE — 71045 X-RAY EXAM CHEST 1 VIEW: CPT

## 2025-01-22 PROCEDURE — 85379 FIBRIN DEGRADATION QUANT: CPT

## 2025-01-22 PROCEDURE — 6370000000 HC RX 637 (ALT 250 FOR IP)

## 2025-01-22 PROCEDURE — 36415 COLL VENOUS BLD VENIPUNCTURE: CPT

## 2025-01-22 RX ORDER — HYDROCODONE POLISTIREX AND CHLORPHENIRAMINE POLISTIREX 10; 8 MG/5ML; MG/5ML
5 SUSPENSION, EXTENDED RELEASE ORAL
Status: COMPLETED | OUTPATIENT
Start: 2025-01-22 | End: 2025-01-22

## 2025-01-22 RX ORDER — KETOROLAC TROMETHAMINE 30 MG/ML
15 INJECTION, SOLUTION INTRAMUSCULAR; INTRAVENOUS ONCE
Status: COMPLETED | OUTPATIENT
Start: 2025-01-22 | End: 2025-01-22

## 2025-01-22 RX ORDER — PROMETHAZINE HYDROCHLORIDE AND CODEINE PHOSPHATE 6.25; 1 MG/5ML; MG/5ML
5 SYRUP ORAL 4 TIMES DAILY PRN
Qty: 118 ML | Refills: 0 | Status: SHIPPED | OUTPATIENT
Start: 2025-01-22 | End: 2025-01-29

## 2025-01-22 RX ORDER — PREDNISONE 20 MG/1
TABLET ORAL
Qty: 9 TABLET | Refills: 0 | Status: SHIPPED | OUTPATIENT
Start: 2025-01-22 | End: 2025-01-27

## 2025-01-22 RX ORDER — IOPAMIDOL 755 MG/ML
100 INJECTION, SOLUTION INTRAVASCULAR
Status: COMPLETED | OUTPATIENT
Start: 2025-01-22 | End: 2025-01-22

## 2025-01-22 RX ORDER — 0.9 % SODIUM CHLORIDE 0.9 %
1000 INTRAVENOUS SOLUTION INTRAVENOUS ONCE
Status: COMPLETED | OUTPATIENT
Start: 2025-01-22 | End: 2025-01-22

## 2025-01-22 RX ADMIN — HYDROCODONE POLISTIREX AND CHLORPHENIRAMINE POLISTIREX 5 ML: 10; 8 SUSPENSION, EXTENDED RELEASE ORAL at 09:27

## 2025-01-22 RX ADMIN — IOPAMIDOL 100 ML: 755 INJECTION, SOLUTION INTRAVENOUS at 12:05

## 2025-01-22 RX ADMIN — KETOROLAC TROMETHAMINE 15 MG: 30 INJECTION, SOLUTION INTRAMUSCULAR at 10:32

## 2025-01-22 RX ADMIN — SODIUM CHLORIDE 1000 ML: 0.9 INJECTION, SOLUTION INTRAVENOUS at 09:32

## 2025-01-22 ASSESSMENT — LIFESTYLE VARIABLES
HOW OFTEN DO YOU HAVE A DRINK CONTAINING ALCOHOL: NEVER
HOW MANY STANDARD DRINKS CONTAINING ALCOHOL DO YOU HAVE ON A TYPICAL DAY: PATIENT DOES NOT DRINK

## 2025-01-22 ASSESSMENT — PAIN DESCRIPTION - DESCRIPTORS: DESCRIPTORS: THROBBING

## 2025-01-22 ASSESSMENT — PAIN DESCRIPTION - PAIN TYPE: TYPE: ACUTE PAIN

## 2025-01-22 ASSESSMENT — PAIN DESCRIPTION - FREQUENCY: FREQUENCY: CONTINUOUS

## 2025-01-22 ASSESSMENT — PAIN - FUNCTIONAL ASSESSMENT
PAIN_FUNCTIONAL_ASSESSMENT: 0-10
PAIN_FUNCTIONAL_ASSESSMENT: PREVENTS OR INTERFERES SOME ACTIVE ACTIVITIES AND ADLS

## 2025-01-22 ASSESSMENT — PAIN DESCRIPTION - ONSET: ONSET: GRADUAL

## 2025-01-22 ASSESSMENT — PAIN SCALES - GENERAL: PAINLEVEL_OUTOF10: 8

## 2025-01-22 ASSESSMENT — PAIN DESCRIPTION - LOCATION: LOCATION: CHEST

## 2025-01-22 ASSESSMENT — PAIN DESCRIPTION - ORIENTATION: ORIENTATION: MID

## 2025-01-22 NOTE — DISCHARGE INSTRUCTIONS
Take full course of steroids and use cough medicine at night.  Follow-up with pulmonology for chronic cough.  Utilize ibuprofen and Tylenol for chest wall pain from coughing.

## 2025-01-22 NOTE — ED PROVIDER NOTES
[facility] EMERGENCY DEPT  EMERGENCY DEPARTMENT HISTORY AND PHYSICAL EXAM      Date: 1/22/2025  Patient Name: Alissa Juarez  MRN: [id number]  YOB: 1980  Date of evaluation: 1/22/2025  Provider: Argentina Luna PA-C   Note Started: 9:00 AM EST 1/22/25    HISTORY OF PRESENT ILLNESS     Chief Complaint   Patient presents with   • Chest Pain     Pt ambulatory into ED room. Pt c/o CP and cough x 1/3/25 when she was here dx with PNA. Pt states she has a hx of mitral valve leaking and CHF.        History Provided By: Patient    HPI: Alissa Juarez is a 44 y.o. female with a history of CHF, diabetes, migraines, and thyroid disease presenting for reevaluation of persistent cough with associated chest discomfort with new onset diarrhea twice daily for the past 2 days.  States she has had temperatures between 99 and 100 at home. Was diagnosed with atypical pneumonia on 1/3 here.  Patient states she finished her azithromycin, Robitussin, and albuterol, and does not feel any better.  States she is more swollen lately, on torsemide 40 mg daily, Entresto, atorvastatin, and Synthroid.    Denies urine changes, abdominal pain, or fevers.    PAST MEDICAL HISTORY   Past Medical History:  Past Medical History:   Diagnosis Date   • Congestive heart disease (HCC)    • Diabetes (HCC)    • Endocrine disease     Grave's Disease   • Other ill-defined conditions(799.89)     graves   • Other ill-defined conditions(799.89)     migraines   • Thyroid disease        Past Surgical History:  Past Surgical History:   Procedure Laterality Date   • COLONOSCOPY N/A 11/17/2023    COLORECTAL CANCER SCREENING, NOT HIGH RISK performed by Carson Hathaway MD at [facility] ENDOSCOPY   • COLONOSCOPY N/A 11/17/2023    COLONOSCOPY POLYPECTOMY SNARE/COLD BIOPSY performed by Carson Hathaway MD at [facility] ENDOSCOPY   • HYSTERECTOMY (CERVIX STATUS UNKNOWN)  2012   • MITRAL VALVE REPLACEMENT  2018   • OTHER SURGICAL HISTORY      removal of ink pen from  past 12 hour(s))   EKG 12 Lead    Collection Time: 01/22/25  7:59 AM   Result Value Ref Range    Ventricular Rate 96 BPM    Atrial Rate 96 BPM    P-R Interval 144 ms    QRS Duration 86 ms    Q-T Interval 340 ms    QTc Calculation (Bazett) 429 ms    P Axis 62 degrees    R Axis 92 degrees    T Axis 17 degrees    Diagnosis       Normal sinus rhythm  Rightward axis  Nonspecific ST abnormality  Abnormal ECG  When compared with ECG of 03-JAN-2025 10:15,  Nonspecific T wave abnormality now evident in Inferior leads     CBC with Auto Differential    Collection Time: 01/22/25  9:35 AM   Result Value Ref Range    WBC 5.4 3.6 - 11.0 K/uL    RBC 4.29 3.80 - 5.20 M/uL    Hemoglobin 12.5 11.5 - 16.0 g/dL    Hematocrit 37.3 35.0 - 47.0 %    MCV 86.9 80.0 - 99.0 FL    MCH 29.1 26.0 - 34.0 PG    MCHC 33.5 30.0 - 36.5 g/dL    RDW 15.9 (H) 11.5 - 14.5 %    Platelets 259 150 - 400 K/uL    MPV 9.6 8.9 - 12.9 FL    Nucleated RBCs 0.0 0  WBC    nRBC 0.00 0.00 - 0.01 K/uL    Neutrophils % 64.3 32.0 - 75.0 %    Lymphocytes % 22.7 12.0 - 49.0 %    Monocytes % 12.0 5.0 - 13.0 %    Eosinophils % 0.2 0.0 - 7.0 %    Basophils % 0.4 0.0 - 1.0 %    Immature Granulocytes % 0.4 0.0 - 0.5 %    Neutrophils Absolute 3.49 1.80 - 8.00 K/UL    Lymphocytes Absolute 1.23 0.80 - 3.50 K/UL    Monocytes Absolute 0.65 0.00 - 1.00 K/UL    Eosinophils Absolute 0.01 0.00 - 0.40 K/UL    Basophils Absolute 0.02 0.00 - 0.10 K/UL    Immature Granulocytes Absolute 0.02 0.00 - 0.04 K/UL    Differential Type AUTOMATED     Comprehensive Metabolic Panel w/ Reflex to MG    Collection Time: 01/22/25  9:35 AM   Result Value Ref Range    Sodium 135 (L) 136 - 145 mmol/L    Potassium 3.7 3.5 - 5.1 mmol/L    Chloride 103 97 - 108 mmol/L    CO2 21 21 - 32 mmol/L    Anion Gap 11 2 - 12 mmol/L    Glucose 120 (H) 65 - 100 mg/dL    BUN 13 6 - 20 MG/DL    Creatinine 1.17 (H) 0.55 - 1.02 MG/DL    BUN/Creatinine Ratio 11 (L) 12 - 20      Est, Glom Filt Rate 58 (L) >60

## 2025-01-23 LAB
EKG ATRIAL RATE: 96 BPM
EKG DIAGNOSIS: NORMAL
EKG P AXIS: 62 DEGREES
EKG P-R INTERVAL: 144 MS
EKG Q-T INTERVAL: 340 MS
EKG QRS DURATION: 86 MS
EKG QTC CALCULATION (BAZETT): 429 MS
EKG R AXIS: 92 DEGREES
EKG T AXIS: 17 DEGREES
EKG VENTRICULAR RATE: 96 BPM

## 2025-02-17 DIAGNOSIS — E78.2 MIXED HYPERLIPIDEMIA: ICD-10-CM

## 2025-02-17 DIAGNOSIS — I50.9 HEART FAILURE, UNSPECIFIED (HCC): ICD-10-CM

## 2025-02-17 DIAGNOSIS — R60.0 LOCALIZED EDEMA: ICD-10-CM

## 2025-02-17 RX ORDER — ATORVASTATIN CALCIUM 20 MG/1
20 TABLET, FILM COATED ORAL DAILY
Qty: 90 TABLET | Refills: 1 | Status: SHIPPED | OUTPATIENT
Start: 2025-02-17

## 2025-02-17 RX ORDER — TORSEMIDE 20 MG/1
40 TABLET ORAL DAILY
Qty: 180 TABLET | Refills: 1 | Status: SHIPPED | OUTPATIENT
Start: 2025-02-17

## 2025-02-17 NOTE — TELEPHONE ENCOUNTER
Requested Prescriptions     Signed Prescriptions Disp Refills    torsemide (DEMADEX) 20 MG tablet 180 tablet 1     Sig: Take 2 tablets by mouth daily     Authorizing Provider: WAYNE DOWD     Ordering User: GIDEON VASQUEZ MD    Future Appointments   Date Time Provider Department Center   3/6/2025 10:30 AM [facility]   4/23/2025 10:20 AM Wayne Dowd MD [facility] AMB   6/18/2025  9:30 AM Unruly Duque MD RDE [facility] BS AMB   10/20/2025  9:00 AM Linda Monique APRN - NP PA[facility] DEP   12/3/2025  9:40 AM DAVINA MILIAN BS AMB   12/3/2025 10:00 AM Nadege Sunshine APRN - CNP [facility] AMB

## 2025-03-06 ENCOUNTER — OFFICE VISIT (OUTPATIENT)
Age: 45
End: 2025-03-06
Payer: MEDICARE

## 2025-03-06 VITALS
OXYGEN SATURATION: 97 % | TEMPERATURE: 96.8 F | SYSTOLIC BLOOD PRESSURE: 101 MMHG | HEART RATE: 88 BPM | RESPIRATION RATE: 18 BRPM | WEIGHT: 271 LBS | BODY MASS INDEX: 41.07 KG/M2 | DIASTOLIC BLOOD PRESSURE: 69 MMHG | HEIGHT: 68 IN

## 2025-03-06 DIAGNOSIS — M25.562 CHRONIC PAIN OF BOTH KNEES: ICD-10-CM

## 2025-03-06 DIAGNOSIS — Z00.00 MEDICARE ANNUAL WELLNESS VISIT, SUBSEQUENT: Primary | ICD-10-CM

## 2025-03-06 DIAGNOSIS — R05.3 PERSISTENT COUGH FOR 3 WEEKS OR LONGER: ICD-10-CM

## 2025-03-06 DIAGNOSIS — E03.9 ACQUIRED HYPOTHYROIDISM: ICD-10-CM

## 2025-03-06 DIAGNOSIS — E11.8 CONTROLLED TYPE 2 DIABETES MELLITUS WITH COMPLICATION, WITHOUT LONG-TERM CURRENT USE OF INSULIN (HCC): ICD-10-CM

## 2025-03-06 DIAGNOSIS — M25.561 CHRONIC PAIN OF BOTH KNEES: ICD-10-CM

## 2025-03-06 DIAGNOSIS — G89.29 CHRONIC PAIN OF BOTH KNEES: ICD-10-CM

## 2025-03-06 DIAGNOSIS — I50.22 CHRONIC SYSTOLIC CHF (CONGESTIVE HEART FAILURE), NYHA CLASS 2 (HCC): ICD-10-CM

## 2025-03-06 PROCEDURE — G0439 PPPS, SUBSEQ VISIT: HCPCS | Performed by: NURSE PRACTITIONER

## 2025-03-06 PROCEDURE — 3046F HEMOGLOBIN A1C LEVEL >9.0%: CPT | Performed by: NURSE PRACTITIONER

## 2025-03-06 RX ORDER — BUDESONIDE AND FORMOTEROL FUMARATE DIHYDRATE 80; 4.5 UG/1; UG/1
2 AEROSOL RESPIRATORY (INHALATION) 2 TIMES DAILY
Qty: 10.2 G | Refills: 0 | Status: SHIPPED | OUTPATIENT
Start: 2025-03-06

## 2025-03-06 SDOH — ECONOMIC STABILITY: FOOD INSECURITY: WITHIN THE PAST 12 MONTHS, THE FOOD YOU BOUGHT JUST DIDN'T LAST AND YOU DIDN'T HAVE MONEY TO GET MORE.: NEVER TRUE

## 2025-03-06 SDOH — ECONOMIC STABILITY: FOOD INSECURITY: WITHIN THE PAST 12 MONTHS, YOU WORRIED THAT YOUR FOOD WOULD RUN OUT BEFORE YOU GOT MONEY TO BUY MORE.: NEVER TRUE

## 2025-03-06 ASSESSMENT — PATIENT HEALTH QUESTIONNAIRE - PHQ9
SUM OF ALL RESPONSES TO PHQ QUESTIONS 1-9: 0
2. FEELING DOWN, DEPRESSED OR HOPELESS: NOT AT ALL
1. LITTLE INTEREST OR PLEASURE IN DOING THINGS: NOT AT ALL

## 2025-03-06 ASSESSMENT — LIFESTYLE VARIABLES
HOW MANY STANDARD DRINKS CONTAINING ALCOHOL DO YOU HAVE ON A TYPICAL DAY: 1 OR 2
HOW OFTEN DO YOU HAVE A DRINK CONTAINING ALCOHOL: MONTHLY OR LESS

## 2025-03-06 NOTE — PROGRESS NOTES
Medicare Annual Wellness Visit    Alissa Juarez is here for Medicare AWV    Assessment & Plan   Medicare annual wellness visit, subsequent  Acquired hypothyroidism  Controlled type 2 diabetes mellitus with complication, without long-term current use of insulin ([facility])  -     Lipid Panel; Future  -     Albumin/Creatinine Ratio, Urine; Future  Chronic systolic CHF (congestive heart failure), NYHA class 2 (Former[facility])  Assessment & Plan:   Monitored by specialist- no acute findings meriting change in the plan  Persistent cough for 3 weeks or longer  -     budesonide-formoterol (SYMBICORT) 80-4.5 MCG/ACT AERO; Inhale 2 puffs into the lungs 2 times daily, Disp-10.2 g, R-0Normal  Chronic pain of both knees  -     [facility] - Tevin Mccarty MD, Orthopedic Surgery (sports medicine), [place]     Return in about 10 months (around 1/6/2026) for Follow Up.     Subjective       She is due for her eye exam, with Dr. Ashraf.        Patient's complete Health Risk Assessment and screening values have been reviewed and are found in Flowsheets. The following problems were reviewed today and where indicated follow up appointments were made and/or referrals ordered.    Positive Risk Factor Screenings with Interventions:    Fall Risk:  Do you feel unsteady or are you worried about falling? : (!) yes  2 or more falls in past year?: no  Fall with injury in past year?: no     Interventions:    Reviewed medications, home hazards, visual acuity, and co-morbidities that can increase risk for falls  Referred for evaluation of knee pain            General HRA Questions:  Select all that apply: (!) New or Increased Pain (knees)  Interventions - Pain:  Referred for evaluation        Abnormal BMI (obese):  Body mass index is 41.21 kg/m². (!) Abnormal  Interventions:  Patient declines any further evaluation or treatment          Vision Screen:  Do you have difficulty driving, watching TV, or doing any of your daily activities because of your eyesight?:

## 2025-03-06 NOTE — PROGRESS NOTES
Chief Complaint   Patient presents with    Medicare AWV         \"Have you been to the ER, urgent care clinic since your last visit?  Hospitalized since your last visit?\"    Yes 01/22/25 for chronic cough    “Have you seen or consulted any other health care providers outside of [facility] since your last visit?”    NO            Click Here for Release of Records Request           3/6/2025    10:27 AM   PHQ-9    Little interest or pleasure in doing things 0   Feeling down, depressed, or hopeless 0   PHQ-2 Score 0   PHQ-9 Total Score 0           Financial Resource Strain: Medium Risk (10/16/2024)    Overall Financial Resource Strain (CARDIA)     Difficulty of Paying Living Expenses: Somewhat hard      Food Insecurity: No Food Insecurity (3/6/2025)    Hunger Vital Sign     Worried About Running Out of Food in the Last Year: Never true     Ran Out of Food in the Last Year: Never true          Health Maintenance Due   Topic Date Due    Hepatitis B vaccine (1 of 3 - 19+ 3-dose series) Never done    DTaP/Tdap/Td vaccine (1 - Tdap) Never done    Pneumococcal 0-49 years Vaccine (2 of 2 - PCV) 01/24/2019    Diabetic retinal exam  01/11/2024    COVID-19 Vaccine (1 - 2024-25 season) Never done    Annual Wellness Visit (Medicare Advantage)  01/01/2025    Diabetic Alb to Cr ratio (uACR) test  02/07/2025    Lipids  02/07/2025

## 2025-03-06 NOTE — PATIENT INSTRUCTIONS
bit, increase the time you're active every day. Try for at least 30 minutes on most days of the week.     Try to quit or cut back on using tobacco and other nicotine products. This includes smoking and vaping. If you need help quitting, talk to your doctor about stop-smoking programs and medicines. These can increase your chances of quitting for good. Quitting is one of the most important things you can do to protect your heart. It is never too late to quit. Try to avoid secondhand smoke too.     Stay at a weight that's healthy for you. Talk to your doctor if you need help losing weight.     Try to get 7 to 9 hours of sleep each night.     Limit alcohol to 2 drinks a day for men and 1 drink a day for women. Too much alcohol can cause health problems.     Manage other health problems such as diabetes, high blood pressure, and high cholesterol. If you think you may have a problem with alcohol or drug use, talk to your doctor.   Medicines    Take your medicines exactly as prescribed. Call your doctor if you think you are having a problem with your medicine.     If your doctor recommends aspirin, take the amount directed each day. Make sure you take aspirin and not another kind of pain reliever, such as acetaminophen (Tylenol).   When should you call for help?   Call 911 if you have symptoms of a heart attack. These may include:    Chest pain or pressure, or a strange feeling in the chest.     Sweating.     Shortness of breath.     Pain, pressure, or a strange feeling in the back, neck, jaw, or upper belly or in one or both shoulders or arms.     Lightheadedness or sudden weakness.     A fast or irregular heartbeat.   After you call 911, the  may tell you to chew 1 adult-strength or 2 to 4 low-dose aspirin. Wait for an ambulance. Do not try to drive yourself.  Watch closely for changes in your health, and be sure to contact your doctor if you have any problems.  Where can you learn more?  Go to

## 2025-03-10 DIAGNOSIS — E10.65 TYPE 1 DIABETES MELLITUS WITH HYPERGLYCEMIA (HCC): ICD-10-CM

## 2025-03-10 RX ORDER — SACUBITRIL AND VALSARTAN 24; 26 MG/1; MG/1
1 TABLET, FILM COATED ORAL 2 TIMES DAILY
Qty: 180 TABLET | Refills: 3 | OUTPATIENT
Start: 2025-03-10

## 2025-03-10 NOTE — TELEPHONE ENCOUNTER
PCP: Linda Monique, APRN - NP    Last appt: 3/6/2025       Future Appointments   Date Time Provider Department Center   2025 10:20 AM Dontrell Grimaldo MD [facility] BS AMB   2025  9:30 AM Unruly Duque MD RDE [facility] BS AMB   12/3/2025  9:40 AM DAVINA MILIAN BS AMB   12/3/2025 10:00 AM Nadege Sunshine APRN - CNP [facility] AMB   2026  9:00 AM Linda Monique[facility] - [facility]       Requested Prescriptions     Pending Prescriptions Disp Refills    Continuous Glucose Sensor (DEXCOM G7 SENSOR) MISC 9 each 4     Si each by Does not apply route every 10 days       Prior labs and Blood pressures:  BP Readings from Last 3 Encounters:   25 101/69   25 98/62   25 94/78     Lab Results   Component Value Date/Time     2025 09:35 AM    K 3.7 2025 09:35 AM     2025 09:35 AM    CO2 21 2025 09:35 AM    BUN 13 2025 09:35 AM    GFRAA >60 2022 10:45 AM     Lab Results   Component Value Date/Time    LEX8KUMN 6.4 2024 09:40 AM     Lab Results   Component Value Date/Time    CHOL 186 2024 12:30 PM    HDL 46 2024 12:30 PM    HDLPOC 23 2019 10:31 AM    .6 2024 12:30 PM    VLDL 30.4 2024 12:30 PM     No results found for: \"VITD3\"    Lab Results   Component Value Date/Time    TSH 1.54 2024 03:44 PM

## 2025-03-10 NOTE — TELEPHONE ENCOUNTER
Requested Prescriptions     Refused Prescriptions Disp Refills    sacubitril-valsartan (ENTRESTO) 24-26 MG per tablet 180 tablet 3     Sig: Take 1 tablet by mouth 2 times daily     Refused By: GIDEON VASQUEZ     Reason for Refusal: Patient has requested refill too soon     VO per MD    Future Appointments   Date Time Provider Department Center   4/23/2025 10:20 AM Dontrell Grimaldo MD [facility] AMB   6/18/2025  9:30 AM Unruly Duque MD RDE [facility] BS AMB   12/3/2025  9:40 AM DAVINA MILIAN BS AMB   12/3/2025 10:00 AM Nadege Sunshine APRN - CNP [facility] AMB   1/6/2026  9:00 AM Linda Monique APRN - NP PAFP [facility] DEP

## 2025-03-11 DIAGNOSIS — E10.65 TYPE 1 DIABETES MELLITUS WITH HYPERGLYCEMIA (HCC): ICD-10-CM

## 2025-03-11 RX ORDER — ACYCLOVIR 400 MG/1
1 TABLET ORAL
Qty: 9 EACH | Refills: 4 | OUTPATIENT
Start: 2025-03-11

## 2025-03-11 RX ORDER — ACYCLOVIR 400 MG/1
1 TABLET ORAL
Qty: 9 EACH | Refills: 4 | Status: SHIPPED | OUTPATIENT
Start: 2025-03-11

## 2025-03-11 NOTE — TELEPHONE ENCOUNTER
Requested Prescriptions     Pending Prescriptions Disp Refills    Continuous Glucose Sensor (DEXCOM G7 SENSOR) MISC 9 each 4     Si each by Does not apply route every 10 days

## 2025-04-07 DIAGNOSIS — R05.3 PERSISTENT COUGH FOR 3 WEEKS OR LONGER: ICD-10-CM

## 2025-04-07 RX ORDER — BUDESONIDE AND FORMOTEROL FUMARATE DIHYDRATE 80; 4.5 UG/1; UG/1
2 AEROSOL RESPIRATORY (INHALATION) 2 TIMES DAILY
Qty: 10.2 G | Refills: 11 | Status: SHIPPED | OUTPATIENT
Start: 2025-04-07

## 2025-04-07 RX ORDER — SACUBITRIL AND VALSARTAN 24; 26 MG/1; MG/1
1 TABLET, FILM COATED ORAL 2 TIMES DAILY
Qty: 180 TABLET | Refills: 1 | Status: SHIPPED | OUTPATIENT
Start: 2025-04-07

## 2025-04-07 NOTE — TELEPHONE ENCOUNTER
Requested Prescriptions     Signed Prescriptions Disp Refills    sacubitril-valsartan (ENTRESTO) 24-26 MG per tablet 180 tablet 1     Sig: Take 1 tablet by mouth 2 times daily     Authorizing Provider: WAYNE DOWD     Ordering User: GIDEON VASQUEZ per MD    Future Appointments   Date Time Provider Department Center   4/23/2025 10:20 AM Wayne Dowd MD [facility] AMB   6/18/2025  9:30 AM Unruly Duque MD RDE [facility] BS AMB   12/3/2025  9:40 AM DAVINA MILIAN BS AMB   12/3/2025 10:00 AM Nadege Sunshine APRN - CNP [facility] AMB   1/6/2026  9:00 AM Linda Monique APRN - FIDE PAFRIC [facility] DEP

## 2025-04-23 ENCOUNTER — OFFICE VISIT (OUTPATIENT)
Age: 45
End: 2025-04-23
Payer: MEDICARE

## 2025-04-23 VITALS
OXYGEN SATURATION: 99 % | HEIGHT: 68 IN | HEART RATE: 78 BPM | SYSTOLIC BLOOD PRESSURE: 96 MMHG | WEIGHT: 273.6 LBS | DIASTOLIC BLOOD PRESSURE: 64 MMHG | BODY MASS INDEX: 41.46 KG/M2

## 2025-04-23 DIAGNOSIS — I10 BENIGN ESSENTIAL HTN: ICD-10-CM

## 2025-04-23 DIAGNOSIS — Z98.890 H/O MITRAL VALVE REPAIR: ICD-10-CM

## 2025-04-23 DIAGNOSIS — Z95.810 ICD (IMPLANTABLE CARDIOVERTER-DEFIBRILLATOR) IN PLACE: ICD-10-CM

## 2025-04-23 DIAGNOSIS — I42.8 NICM (NONISCHEMIC CARDIOMYOPATHY) (HCC): Primary | ICD-10-CM

## 2025-04-23 DIAGNOSIS — Z87.891 HISTORY OF TOBACCO USE: ICD-10-CM

## 2025-04-23 PROCEDURE — 4004F PT TOBACCO SCREEN RCVD TLK: CPT | Performed by: INTERNAL MEDICINE

## 2025-04-23 PROCEDURE — G8417 CALC BMI ABV UP PARAM F/U: HCPCS | Performed by: INTERNAL MEDICINE

## 2025-04-23 PROCEDURE — G8427 DOCREV CUR MEDS BY ELIG CLIN: HCPCS | Performed by: INTERNAL MEDICINE

## 2025-04-23 PROCEDURE — G2211 COMPLEX E/M VISIT ADD ON: HCPCS | Performed by: INTERNAL MEDICINE

## 2025-04-23 PROCEDURE — 99214 OFFICE O/P EST MOD 30 MIN: CPT | Performed by: INTERNAL MEDICINE

## 2025-04-23 PROCEDURE — 3074F SYST BP LT 130 MM HG: CPT | Performed by: INTERNAL MEDICINE

## 2025-04-23 PROCEDURE — 3078F DIAST BP <80 MM HG: CPT | Performed by: INTERNAL MEDICINE

## 2025-04-23 RX ORDER — LORATADINE, PSEUDOEPHEDRINE SULFATE 5; 120 MG/1; MG/1
1 TABLET, FILM COATED, EXTENDED RELEASE ORAL DAILY
COMMUNITY

## 2025-04-23 ASSESSMENT — PATIENT HEALTH QUESTIONNAIRE - PHQ9
SUM OF ALL RESPONSES TO PHQ QUESTIONS 1-9: 0
SUM OF ALL RESPONSES TO PHQ QUESTIONS 1-9: 0
2. FEELING DOWN, DEPRESSED OR HOPELESS: NOT AT ALL
1. LITTLE INTEREST OR PLEASURE IN DOING THINGS: NOT AT ALL
SUM OF ALL RESPONSES TO PHQ QUESTIONS 1-9: 0
SUM OF ALL RESPONSES TO PHQ QUESTIONS 1-9: 0

## 2025-04-23 NOTE — PROGRESS NOTES
1. Have you been to the ER, urgent care clinic since your last visit?  Hospitalized since your last visit?  [facility] ED 1/22/2025 for cough   [facility] ED 1/3/2025 for chest pain, body aches, rib pain     2. Have you seen or consulted any other health care providers outside of the [facility] since your last visit?  Include any pap smears or colon screening.

## 2025-04-23 NOTE — PROGRESS NOTES
[facility] [place]:   [phone]    History of Present Illness:  Ms. Juarez is a 46 yo F with h/o non ischemic cardiomyopathy EF 55% (as low as 13% in the past), cath 2018 and 2019 with no significant CAD; mild LAD/RCA plaquing, severe MR s/p MV repair 2018, status post Medtronic ICD in 01/2021, HTN, mixed hyperlipidemia, h/o hyperthyroid, Grave's disease (treated with radiation isotope therapy in 2019). Cardiac MRI in 01/2018 with ejection fraction of 40% and findings of dilated nonischemic cardiomyopathy. Right heart catheterization in 2018, 2019 and 2020.     Since her last visit, overall she has been doing well cardiac wise.  She denies any exertional chest pain or shortness of breath.  No significant palpitations.  She is trying to exercise regularly and has a group that goes walking.  She is also watching her dietary intake and is eating better.  Her weight is up slightly, but I did let her know it is more important to focus on lifestyle changes.  She is compensated on exam with clear lungs and just trace lower extremity edema, I/VI systolic murmur at the left sternal border.      Assessment and Plan:    1. Nonischemic cardiomyopathy, EF of 50-55% (has been as low as 13% in the past).  Will have her follow up with a same day echocardiogram in six months.    2. Severe MR, status post mitral valve repair in 2018.   3. Medtronic ICD.  Followed by Dr. Peres and the device clinic.    4. Tobacco use.  Continue cessation.   5. Graves disease.  Treated with radiation isotope therapy 2019.        She  has a past medical history of Congestive heart disease (HCC), Diabetes (HCC), Endocrine disease, Other ill-defined conditions(799.89), Other ill-defined conditions(799.89), and Thyroid disease.    All other systems negative except as above.     PE  Vitals:    04/23/25 1014   BP: 96/64   BP Site: Left Upper Arm   Patient Position: Sitting   BP Cuff Size: Large Adult   Pulse: 78   SpO2: 99%   Weight: 124.1 kg (273 lb

## 2025-05-20 DIAGNOSIS — E78.2 MIXED HYPERLIPIDEMIA: ICD-10-CM

## 2025-05-21 RX ORDER — ATORVASTATIN CALCIUM 20 MG/1
20 TABLET, FILM COATED ORAL DAILY
Qty: 90 TABLET | Refills: 1 | OUTPATIENT
Start: 2025-05-21

## 2025-06-01 PROCEDURE — 93295 DEV INTERROG REMOTE 1/2/MLT: CPT | Performed by: INTERNAL MEDICINE

## 2025-06-18 ENCOUNTER — OFFICE VISIT (OUTPATIENT)
Age: 45
End: 2025-06-18
Payer: MEDICARE

## 2025-06-18 VITALS
SYSTOLIC BLOOD PRESSURE: 110 MMHG | DIASTOLIC BLOOD PRESSURE: 75 MMHG | HEART RATE: 88 BPM | WEIGHT: 277.6 LBS | HEIGHT: 68 IN | BODY MASS INDEX: 42.07 KG/M2

## 2025-06-18 DIAGNOSIS — E10.9 KETOSIS PRONE DIABETES (HCC): ICD-10-CM

## 2025-06-18 DIAGNOSIS — E89.0 POSTABLATIVE HYPOTHYROIDISM: ICD-10-CM

## 2025-06-18 DIAGNOSIS — E11.65 UNCONTROLLED TYPE 2 DIABETES MELLITUS WITH HYPERGLYCEMIA (HCC): Primary | ICD-10-CM

## 2025-06-18 LAB — HBA1C MFR BLD: 7.1 %

## 2025-06-18 PROCEDURE — G8428 CUR MEDS NOT DOCUMENT: HCPCS | Performed by: INTERNAL MEDICINE

## 2025-06-18 PROCEDURE — 3046F HEMOGLOBIN A1C LEVEL >9.0%: CPT | Performed by: INTERNAL MEDICINE

## 2025-06-18 PROCEDURE — 99214 OFFICE O/P EST MOD 30 MIN: CPT | Performed by: INTERNAL MEDICINE

## 2025-06-18 PROCEDURE — 2022F DILAT RTA XM EVC RTNOPTHY: CPT | Performed by: INTERNAL MEDICINE

## 2025-06-18 PROCEDURE — G8417 CALC BMI ABV UP PARAM F/U: HCPCS | Performed by: INTERNAL MEDICINE

## 2025-06-18 PROCEDURE — 95251 CONT GLUC MNTR ANALYSIS I&R: CPT | Performed by: INTERNAL MEDICINE

## 2025-06-18 PROCEDURE — 4004F PT TOBACCO SCREEN RCVD TLK: CPT | Performed by: INTERNAL MEDICINE

## 2025-06-18 PROCEDURE — 3051F HG A1C>EQUAL 7.0%<8.0%: CPT | Performed by: INTERNAL MEDICINE

## 2025-06-18 PROCEDURE — 83036 HEMOGLOBIN GLYCOSYLATED A1C: CPT | Performed by: INTERNAL MEDICINE

## 2025-06-18 RX ORDER — INSULIN DEGLUDEC 200 U/ML
30 INJECTION, SOLUTION SUBCUTANEOUS DAILY
Qty: 15 ML | Refills: 3 | Status: SHIPPED | OUTPATIENT
Start: 2025-06-18

## 2025-06-18 NOTE — PROGRESS NOTES
This is a 44-year-old woman with a history of nonischemic cardiomyopathy status post mitral valve repair at [facility] who presented to [facility] in 2022 with a blood sugar of 990.  Patient was treated with IV insulin and was transitioned to insulin therapy subcutaneously.DEANN Ab negative in 2022. She was discharged from [facility] on Lantus and Humalog.  In March 2023, her insulin requirements fell dramatically and her A1c fell to 5.0%.  She was taken off insulin and started on Trulicity which she took for 6 weeks but continued to have episodes of hypoglycemia so this medication was discontinued.  She was then off all antihyperglycemic therapy from March 2023 until January 2024 when she presented to [facility].  Labs at her presentation included an A1c of greater than 14%, glucose 1124, bicarb 8, anion gap 34, creat 2.68, venous pH 7.24, urine ketones 4+.  She was treated with intravenous insulin according to the DKA protocol.  She reported a 40 pound weight loss at the time of the presentation in January 2024.  She was seen by [facility] and then transitioned off the insulin drip onto basal bolus insulin.  She was discharged from [facility] on 56 units of Lantus and 11 units of Humalog.  I saw her in clinic February 2024. I again considered possible Type 1 and repeated her DEANN antibodies which were again negative. She then called me in early May 2024 with episodes of recurrent hypoglycemia almost identical to the situation in 2022 and we again stopped the insulin. 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      Past Medical History  Graves' disease status post radioactive iodine treatment with subsequent hypothyroidism.  Her levothyroxine has gradually been increased from 50 ug and was recently increased to 100 mcg daily.  She has a history of nonischemic cardiomyopathy with a recent EF of 55% (up from 13%), followed at [facility]. Recent NT-proBNP

## 2025-08-14 DIAGNOSIS — R60.0 LOCALIZED EDEMA: ICD-10-CM

## 2025-08-14 DIAGNOSIS — I50.9 HEART FAILURE, UNSPECIFIED (HCC): ICD-10-CM

## 2025-08-14 RX ORDER — TORSEMIDE 20 MG/1
40 TABLET ORAL DAILY
Qty: 180 TABLET | Refills: 1 | Status: SHIPPED | OUTPATIENT
Start: 2025-08-14

## (undated) DEVICE — CATHETER IV 24GA L0.75IN OD0.6604-0.7366MM

## (undated) DEVICE — CATHETER IV 22GA L1IN OD0.8382-0.9144MM ID0.6096-0.6858MM 382523

## (undated) DEVICE — FORCEPS BX L240CM JAW DIA2.4MM ORNG L CAP W/ NDL DISP RAD

## (undated) DEVICE — SYSTEM REPROC CBL 3 LD DISPOSABLE

## (undated) DEVICE — CATHETER IV 20GA L1.16IN OD1.0414-1.1176MM ID0.762-0.8382MM

## (undated) DEVICE — CUFF BLD PRSS AD CLTH SGL TB W/ BAYNT CONN ROUNDED CORNER

## (undated) DEVICE — IV START KIT: Brand: MEDLINE

## (undated) DEVICE — SNARE ENDOSCP POLYP MED STD AD 2.4X27X240 CM 2.8 MM OVL SENS

## (undated) DEVICE — ENDOSCOPIC KIT COMPLIANCE ENDOKIT

## (undated) DEVICE — TRAP ENDOSCP POLYP 2 CHMBR DRAWER TYP

## (undated) DEVICE — SNARE ENDOSCP M L240CM W27MM SHTH DIA2.4MM CHN 2.8MM OVL

## (undated) DEVICE — CONTAINER SPEC 20 ML LID NEUT BUFF FORMALIN 10 % POLYPR STS

## (undated) DEVICE — TIP SUCT TRNSPAR RIB SURF STD BLB RIG NVENT W/ 5IN1 CONN DYND50138] MEDLINE INDUSTRIES INC]

## (undated) DEVICE — SET GRAV CK VLV NEEDLESS ST 3 GANGED 4WAY STPCOCK HI FLO 10

## (undated) DEVICE — CATHETER IV 18GA L1.16IN OD1.27-1.3462MM ID0.9398-1.016MM